# Patient Record
Sex: FEMALE | Race: WHITE | NOT HISPANIC OR LATINO | Employment: OTHER | URBAN - METROPOLITAN AREA
[De-identification: names, ages, dates, MRNs, and addresses within clinical notes are randomized per-mention and may not be internally consistent; named-entity substitution may affect disease eponyms.]

---

## 2023-04-26 ENCOUNTER — APPOINTMENT (OUTPATIENT)
Dept: PRIMARY CARE | Facility: CLINIC | Age: 73
End: 2023-04-26
Payer: COMMERCIAL

## 2023-06-05 LAB
ALANINE AMINOTRANSFERASE (SGPT) (U/L) IN SER/PLAS: 41 U/L (ref 7–45)
ALBUMIN (G/DL) IN SER/PLAS: 4.3 G/DL (ref 3.4–5)
ALKALINE PHOSPHATASE (U/L) IN SER/PLAS: 52 U/L (ref 33–136)
ANION GAP IN SER/PLAS: 12 MMOL/L (ref 10–20)
ASPARTATE AMINOTRANSFERASE (SGOT) (U/L) IN SER/PLAS: 33 U/L (ref 9–39)
BILIRUBIN TOTAL (MG/DL) IN SER/PLAS: 0.4 MG/DL (ref 0–1.2)
CALCIUM (MG/DL) IN SER/PLAS: 9.9 MG/DL (ref 8.6–10.3)
CARBON DIOXIDE, TOTAL (MMOL/L) IN SER/PLAS: 27 MMOL/L (ref 21–32)
CHLORIDE (MMOL/L) IN SER/PLAS: 102 MMOL/L (ref 98–107)
CREATININE (MG/DL) IN SER/PLAS: 0.78 MG/DL (ref 0.5–1.05)
GFR FEMALE: 80 ML/MIN/1.73M2
GLUCOSE (MG/DL) IN SER/PLAS: 159 MG/DL (ref 74–99)
POTASSIUM (MMOL/L) IN SER/PLAS: 4.3 MMOL/L (ref 3.5–5.3)
PROTEIN TOTAL: 7.2 G/DL (ref 6.4–8.2)
SODIUM (MMOL/L) IN SER/PLAS: 137 MMOL/L (ref 136–145)
UREA NITROGEN (MG/DL) IN SER/PLAS: 16 MG/DL (ref 6–23)

## 2023-06-06 LAB
ALBUMIN (MG/L) IN URINE: 25.9 MG/L
ALBUMIN/CREATININE (UG/MG) IN URINE: 51.5 UG/MG CRT (ref 0–30)
CREATININE (MG/DL) IN URINE: 50.3 MG/DL (ref 20–320)
ESTIMATED AVERAGE GLUCOSE FOR HBA1C: 186 MG/DL
HEMOGLOBIN A1C/HEMOGLOBIN TOTAL IN BLOOD: 8.1 %

## 2023-09-11 PROBLEM — E11.40 PAINFUL DIABETIC NEUROPATHY (MULTI): Status: ACTIVE | Noted: 2023-09-11

## 2023-09-11 PROBLEM — J32.9 SINUSITIS: Status: ACTIVE | Noted: 2023-09-11

## 2023-09-11 PROBLEM — R06.02 EXERTIONAL SHORTNESS OF BREATH: Status: ACTIVE | Noted: 2023-09-11

## 2023-09-11 PROBLEM — E11.65 TYPE 2 DIABETES MELLITUS WITH HYPERGLYCEMIA (MULTI): Status: ACTIVE | Noted: 2023-09-11

## 2023-09-11 PROBLEM — M17.9 OA (OSTEOARTHRITIS) OF KNEE: Status: ACTIVE | Noted: 2023-09-11

## 2023-09-11 PROBLEM — E78.5 HYPERLIPIDEMIA: Status: ACTIVE | Noted: 2023-09-11

## 2023-09-11 PROBLEM — M16.9 OA (OSTEOARTHRITIS) OF HIP: Status: ACTIVE | Noted: 2023-09-11

## 2023-09-11 PROBLEM — R53.1 GENERALIZED WEAKNESS: Status: ACTIVE | Noted: 2023-09-11

## 2023-09-11 PROBLEM — J45.909 ASTHMA (HHS-HCC): Status: ACTIVE | Noted: 2023-09-11

## 2023-09-11 PROBLEM — N63.0 BREAST MASS: Status: ACTIVE | Noted: 2023-09-11

## 2023-09-11 PROBLEM — L03.90 CELLULITIS: Status: ACTIVE | Noted: 2023-09-11

## 2023-09-11 PROBLEM — I87.8 VENOUS STASIS: Status: ACTIVE | Noted: 2023-09-11

## 2023-09-11 PROBLEM — R40.0 DAYTIME SLEEPINESS: Status: ACTIVE | Noted: 2023-09-11

## 2023-09-11 PROBLEM — T14.8XXA BRUISING: Status: ACTIVE | Noted: 2023-09-11

## 2023-09-11 PROBLEM — M25.562 BILATERAL KNEE PAIN: Status: ACTIVE | Noted: 2023-09-11

## 2023-09-11 PROBLEM — E66.9 OBESITY (BMI 30-39.9): Status: ACTIVE | Noted: 2023-09-11

## 2023-09-11 PROBLEM — R41.82 CHANGE IN MENTAL STATUS: Status: ACTIVE | Noted: 2023-09-11

## 2023-09-11 PROBLEM — R76.8 ELEVATED RHEUMATOID FACTOR: Status: ACTIVE | Noted: 2023-09-11

## 2023-09-11 PROBLEM — R74.01 TRANSAMINITIS: Status: ACTIVE | Noted: 2023-09-11

## 2023-09-11 PROBLEM — M79.673 FOOT PAIN: Status: ACTIVE | Noted: 2023-09-11

## 2023-09-11 PROBLEM — E11.9 DIABETES MELLITUS, TYPE 2 (MULTI): Status: ACTIVE | Noted: 2023-09-11

## 2023-09-11 PROBLEM — M25.561 BILATERAL KNEE PAIN: Status: ACTIVE | Noted: 2023-09-11

## 2023-09-11 PROBLEM — G62.9 PERIPHERAL NEUROPATHY: Status: ACTIVE | Noted: 2023-09-11

## 2023-09-11 PROBLEM — F99: Status: ACTIVE | Noted: 2023-09-11

## 2023-09-11 PROBLEM — M79.2 NEUROPATHIC PAIN OF LOWER EXTREMITY: Status: ACTIVE | Noted: 2023-09-11

## 2023-09-11 PROBLEM — R60.0 PEDAL EDEMA: Status: ACTIVE | Noted: 2023-09-11

## 2023-09-11 PROBLEM — G47.8 NON-RESTORATIVE SLEEP: Status: ACTIVE | Noted: 2023-09-11

## 2023-09-11 PROBLEM — E55.9 VITAMIN D DEFICIENCY: Status: ACTIVE | Noted: 2023-09-11

## 2023-09-11 PROBLEM — L65.9 HAIR LOSS: Status: ACTIVE | Noted: 2023-09-11

## 2023-09-11 PROBLEM — J30.0 CHRONIC VASOMOTOR RHINITIS: Status: ACTIVE | Noted: 2023-09-11

## 2023-09-11 PROBLEM — G47.10 ACUTE HYPERSOMNOLENCE DISORDER: Status: ACTIVE | Noted: 2023-09-11

## 2023-09-11 PROBLEM — R91.8 MASS OF LUNG: Status: ACTIVE | Noted: 2023-09-11

## 2023-09-11 PROBLEM — G47.00 INSOMNIA: Status: ACTIVE | Noted: 2023-09-11

## 2023-09-11 PROBLEM — R05.9 COUGH: Status: ACTIVE | Noted: 2023-09-11

## 2023-09-11 PROBLEM — I10 ESSENTIAL HYPERTENSION: Status: ACTIVE | Noted: 2023-09-11

## 2023-09-11 PROBLEM — G47.30 SLEEP APNEA: Status: ACTIVE | Noted: 2023-09-11

## 2023-09-11 PROBLEM — H92.09 OTALGIA: Status: ACTIVE | Noted: 2023-09-11

## 2023-09-11 PROBLEM — M25.50 ARTHRALGIA OF MULTIPLE JOINTS: Status: ACTIVE | Noted: 2023-09-11

## 2023-09-11 PROBLEM — R91.1 PULMONARY NODULE: Status: ACTIVE | Noted: 2023-09-11

## 2023-10-12 DIAGNOSIS — E11.9 TYPE 2 DIABETES MELLITUS WITHOUT COMPLICATION, WITHOUT LONG-TERM CURRENT USE OF INSULIN (MULTI): Primary | ICD-10-CM

## 2023-10-13 RX ORDER — SITAGLIPTIN 100 MG/1
100 TABLET, FILM COATED ORAL DAILY
Qty: 90 TABLET | Refills: 0 | Status: SHIPPED | OUTPATIENT
Start: 2023-10-13 | End: 2023-12-15

## 2023-11-30 ENCOUNTER — PATIENT MESSAGE (OUTPATIENT)
Dept: ENDOCRINOLOGY | Facility: CLINIC | Age: 73
End: 2023-11-30
Payer: COMMERCIAL

## 2023-11-30 DIAGNOSIS — E11.65 TYPE 2 DIABETES MELLITUS WITH HYPERGLYCEMIA, WITHOUT LONG-TERM CURRENT USE OF INSULIN (MULTI): Primary | ICD-10-CM

## 2023-11-30 RX ORDER — REPAGLINIDE 2 MG/1
TABLET ORAL
Qty: 360 TABLET | Refills: 1 | Status: SHIPPED | OUTPATIENT
Start: 2023-11-30 | End: 2024-04-25 | Stop reason: ALTCHOICE

## 2023-11-30 RX ORDER — FLASH GLUCOSE SENSOR
KIT MISCELLANEOUS
Qty: 6 EACH | Refills: 3 | Status: SHIPPED | OUTPATIENT
Start: 2023-11-30 | End: 2024-02-13 | Stop reason: SDUPTHER

## 2023-11-30 RX ORDER — REPAGLINIDE 2 MG/1
2-4 TABLET ORAL 2 TIMES DAILY
COMMUNITY
End: 2023-11-30 | Stop reason: SDUPTHER

## 2023-11-30 NOTE — TELEPHONE ENCOUNTER
From: Lexis Denny  To: Sangeetha Champagne APRN-CNP  Sent: 11/30/2023 1:15 AM EST  Subject: Prescription Refills    Jamie Vivas,  This message concerns all three of my prescriptions:  1. I need a new refill prescription for Repaglinide 2mg.   2. I also need a new refill prescription for the Free Style Gabbi, CGM  3. Januvia - Also it’s time to send in to send in the enrollment form for the [employer] Patient Assistance Program for the Januvia 100mg I take. The form has to be resubmitted each year. I have the form and completed my section. I’m planning on dropping it off today (Thursday 11/30) at your office so you can fill out your part. I have enough Januvia to get me through Dec. but maybe you could give me some samples in case it takes too much time for the application to be processed.   Thank you Sangeetha !  Alycia

## 2023-12-15 DIAGNOSIS — E11.9 TYPE 2 DIABETES MELLITUS WITHOUT COMPLICATION, WITHOUT LONG-TERM CURRENT USE OF INSULIN (MULTI): ICD-10-CM

## 2023-12-15 RX ORDER — SITAGLIPTIN 100 MG/1
100 TABLET, FILM COATED ORAL DAILY
Qty: 90 TABLET | Refills: 0 | Status: SHIPPED | OUTPATIENT
Start: 2023-12-15 | End: 2024-02-08 | Stop reason: ALTCHOICE

## 2024-01-02 ENCOUNTER — PATIENT MESSAGE (OUTPATIENT)
Dept: ENDOCRINOLOGY | Facility: CLINIC | Age: 74
End: 2024-01-02

## 2024-01-02 DIAGNOSIS — E11.65 TYPE 2 DIABETES MELLITUS WITH HYPERGLYCEMIA, WITHOUT LONG-TERM CURRENT USE OF INSULIN (MULTI): Primary | ICD-10-CM

## 2024-01-11 ENCOUNTER — TELEMEDICINE (OUTPATIENT)
Dept: PHARMACY | Facility: HOSPITAL | Age: 74
End: 2024-01-11
Payer: MEDICARE

## 2024-01-11 DIAGNOSIS — E11.65 TYPE 2 DIABETES MELLITUS WITH HYPERGLYCEMIA, WITHOUT LONG-TERM CURRENT USE OF INSULIN (MULTI): ICD-10-CM

## 2024-01-12 ENCOUNTER — TELEPHONE (OUTPATIENT)
Dept: ENDOCRINOLOGY | Facility: CLINIC | Age: 74
End: 2024-01-12
Payer: MEDICARE

## 2024-01-12 RX ORDER — SEMAGLUTIDE 0.68 MG/ML
INJECTION, SOLUTION SUBCUTANEOUS
Qty: 3 ML | Refills: 3 | Status: SHIPPED | OUTPATIENT
Start: 2024-01-12 | End: 2024-01-12 | Stop reason: ALTCHOICE

## 2024-01-12 NOTE — TELEPHONE ENCOUNTER
Alycia left a VM to thank you for all your care. She is doing well on the Mounjaro. No nausea. She had an appt with Madhavi and had good visit with him. She just wanted to let you know she is very grateful and is doing well.

## 2024-01-24 RX ORDER — TIRZEPATIDE 5 MG/.5ML
5 INJECTION, SOLUTION SUBCUTANEOUS
Qty: 2 ML | Refills: 3 | Status: SHIPPED | OUTPATIENT
Start: 2024-01-24 | End: 2024-05-03 | Stop reason: SDUPTHER

## 2024-01-24 NOTE — PROGRESS NOTES
Clinical Pharmacy Team contacted met with Lexis Denny regarding a consultation for diabetes management thanks to a referral from Sangeetha Champagne CNP. Below is a summary of our conversation and recommendations:    [facility]  ________________________________________________________________________      Allergies   Allergen Reactions    Acetaminophen Other    Aspirin Other    Clarithromycin Other    Metformin Other    Rosuvastatin Unknown       Objective     There were no vitals taken for this visit.    Diabetes Pharmacotherapy:    Januvia 100 mg daily  Repaglinide 2mg tablet      Lab Review  Lab Results   Component Value Date    BILITOT 0.4 06/05/2023    CALCIUM 9.9 06/05/2023    CO2 27 06/05/2023     06/05/2023    CREATININE 0.78 06/05/2023    GLUCOSE 159 (H) 06/05/2023    ALKPHOS 52 06/05/2023    K 4.3 06/05/2023    PROT 7.2 06/05/2023     06/05/2023    AST 33 06/05/2023    ALT 41 06/05/2023    BUN 16 06/05/2023    ANIONGAP 12 06/05/2023    MG 2.11 06/25/2020    PHOS 3.8 11/11/2020    ALBUMIN 4.3 06/05/2023    GFRF 80 06/05/2023     Lab Results   Component Value Date    TRIG 177 (H) 12/16/2022    CHOL 200 (H) 12/16/2022    HDL 31.3 (A) 12/16/2022     Lab Results   Component Value Date    HGBA1C 8.1 (A) 06/05/2023    HGBA1C 11.9 (A) 01/27/2022    HGBA1C 7.9 (A) 10/07/2021     The 10-year ASCVD risk score (Veronica CARABALLO, et al., 2019) is: 35.7%    Values used to calculate the score:      Age: 73 years      Sex: Female      Is Non- : No      Diabetic: Yes      Tobacco smoker: No      Systolic Blood Pressure: 138 mmHg      Is BP treated: Yes      HDL Cholesterol: 31.3 mg/dL      Total Cholesterol: 200 mg/dL            Assessment/Plan     The patient reports today for a diabetes consultation. She was instructed to start mounjaro from her endocrinology provider. We discussed therapy today.     Mounjaro Education:     - Counseled patient on MOA, expectations, side effects,  duration of therapy, contraindications, administration, and monitoring parameters  - Answered all patient questions and concerns  - Counseled patient on Mounjaro MOA, expectations, side effects, duration of therapy, administration, and monitoring parameters.  - Provided detailed dosing and administration counseling to ensure proper technique.   - Reviewed Mounjaro titration schedule, starting with 2.5 mg once weekly to a goal of 15 mg once weekly if tolerate  - Counseled patient on the benefits of GLP-1ra glycemic control and weight loss  - Reviewed storage requirements of Mounjaro when not in use, and when to administer the medication if a dose is missed.  - Advised patient that they may experience improved satiety after meals and portion sizes of meals may be reduced as doses of Mounjaro increase.      Patient agreeable to discussion. Will send to  pharmacy for copay assistance.    PATIENT EDUCATION/GOALS    Goals  Fasting B - 130 mg/dL  Postprandial BG: less than 180 mg/dL  A1c: less than 7%    Type of encounter: [ virtual ]    Provided counseling on lifestyle modifications, medications, and self-monitoring. Patient has no additional questions at this time. Pharmacy to follow up in 4-6 weeks. Please reach out with any questions. Thank you.       Madhavi Downs, LynneD      Continue all meds under the continuation of care with the referring provider and clinical pharmacy team.

## 2024-01-24 NOTE — TELEPHONE ENCOUNTER
Will forward to pharmacy.  Where do we stand on her application for  PAP?  I may need to supply her with another sample if needed.

## 2024-01-24 NOTE — TELEPHONE ENCOUNTER
Left another message just to say she is really liking the mounjaro and she is doing well with it. She did mention that she'd like to know if you've heard anything about the financial program for it and whether or not she qualifies.

## 2024-01-25 ENCOUNTER — PHARMACY VISIT (OUTPATIENT)
Dept: PHARMACY | Facility: CLINIC | Age: 74
End: 2024-01-25
Payer: COMMERCIAL

## 2024-01-25 PROCEDURE — RXMED WILLOW AMBULATORY MEDICATION CHARGE

## 2024-02-08 ENCOUNTER — TELEMEDICINE (OUTPATIENT)
Dept: PHARMACY | Facility: HOSPITAL | Age: 74
End: 2024-02-08
Payer: MEDICARE

## 2024-02-08 DIAGNOSIS — E11.65 TYPE 2 DIABETES MELLITUS WITH HYPERGLYCEMIA, WITHOUT LONG-TERM CURRENT USE OF INSULIN (MULTI): Primary | ICD-10-CM

## 2024-02-13 ENCOUNTER — TELEPHONE (OUTPATIENT)
Dept: ENDOCRINOLOGY | Facility: CLINIC | Age: 74
End: 2024-02-13
Payer: MEDICARE

## 2024-02-13 DIAGNOSIS — E11.65 TYPE 2 DIABETES MELLITUS WITH HYPERGLYCEMIA, WITHOUT LONG-TERM CURRENT USE OF INSULIN (MULTI): ICD-10-CM

## 2024-02-13 PROCEDURE — RXMED WILLOW AMBULATORY MEDICATION CHARGE

## 2024-02-13 RX ORDER — FLASH GLUCOSE SENSOR
KIT MISCELLANEOUS
Qty: 6 EACH | Refills: 3 | Status: SHIPPED | OUTPATIENT
Start: 2024-02-13

## 2024-02-13 NOTE — TELEPHONE ENCOUNTER
Her [facility] on file is permanently closing. She is trying to figure out best next steps for her meds and is considering using [facility] mail order. She stated in her message she would have Gene send a My Chart, but that has not come through. I have initiated a My Chart to them in regard.    She started to say she's doing well, but her VM cut off.

## 2024-02-14 ENCOUNTER — PHARMACY VISIT (OUTPATIENT)
Dept: PHARMACY | Facility: CLINIC | Age: 74
End: 2024-02-14
Payer: COMMERCIAL

## 2024-02-15 PROCEDURE — RXMED WILLOW AMBULATORY MEDICATION CHARGE

## 2024-02-19 ENCOUNTER — PHARMACY VISIT (OUTPATIENT)
Dept: PHARMACY | Facility: CLINIC | Age: 74
End: 2024-02-19
Payer: COMMERCIAL

## 2024-03-07 ENCOUNTER — TELEMEDICINE CLINICAL SUPPORT (OUTPATIENT)
Dept: PRIMARY CARE | Facility: CLINIC | Age: 74
End: 2024-03-07
Payer: COMMERCIAL

## 2024-03-07 DIAGNOSIS — R09.89 SINUS SYMPTOM: Primary | ICD-10-CM

## 2024-03-07 PROCEDURE — 99213 OFFICE O/P EST LOW 20 MIN: CPT | Performed by: NURSE PRACTITIONER

## 2024-03-07 RX ORDER — AMOXICILLIN AND CLAVULANATE POTASSIUM 875; 125 MG/1; MG/1
1 TABLET, FILM COATED ORAL 2 TIMES DAILY
Qty: 14 TABLET | Refills: 0 | Status: SHIPPED | OUTPATIENT
Start: 2024-03-07 | End: 2024-03-14

## 2024-03-07 ASSESSMENT — LIFESTYLE VARIABLES: HISTORY_OF_SMOKING: I HAVE NEVER SMOKED

## 2024-03-07 NOTE — PROGRESS NOTES
"Subjective   Patient ID: Lexis Denny \"Yessica" is a 74 y.o. female who presents for No chief complaint on file..  Sinus symptoms 2-3 weeks now mainly right side of forehead and cheek.  Only OTC Similasen not helping.  Some Flonase    No fever        Review of Systems   Constitutional: Negative.    HENT:  Positive for congestion, postnasal drip, sinus pressure and sinus pain.    Respiratory: Negative.     Cardiovascular: Negative.        Objective   Physical Exam  Constitutional:       General: She is not in acute distress.     Appearance: Normal appearance. She is not toxic-appearing.   HENT:      Nose: Congestion present.   Pulmonary:      Effort: Pulmonary effort is normal.   Musculoskeletal:      Cervical back: Normal range of motion.   Neurological:      Mental Status: She is oriented to person, place, and time.         Assessment/Plan   Diagnoses and all orders for this visit:  Sinus symptom  -     amoxicillin-pot clavulanate (Augmentin) 875-125 mg tablet; Take 1 tablet by mouth 2 times a day for 7 days.           PITA Mcgovern-CNP 03/07/24 2:52 PM   "

## 2024-03-19 ENCOUNTER — PHARMACY VISIT (OUTPATIENT)
Dept: PHARMACY | Facility: CLINIC | Age: 74
End: 2024-03-19
Payer: COMMERCIAL

## 2024-03-19 PROCEDURE — RXMED WILLOW AMBULATORY MEDICATION CHARGE

## 2024-04-10 ASSESSMENT — ENCOUNTER SYMPTOMS
SINUS PRESSURE: 1
RESPIRATORY NEGATIVE: 1
SINUS PAIN: 1
CARDIOVASCULAR NEGATIVE: 1
CONSTITUTIONAL NEGATIVE: 1

## 2024-04-10 NOTE — ASSESSMENT & PLAN NOTE
Hx and limited exam are c/w emerging secondary bacterial infection, will treat.  Reviewed correct admin of Flonase  Discussed typical course versus symptoms to report  PLAIN Mucinex may be helpful in thinning secretions.

## 2024-04-12 PROCEDURE — RXMED WILLOW AMBULATORY MEDICATION CHARGE

## 2024-04-16 ENCOUNTER — PHARMACY VISIT (OUTPATIENT)
Dept: PHARMACY | Facility: CLINIC | Age: 74
End: 2024-04-16
Payer: COMMERCIAL

## 2024-04-16 ENCOUNTER — PATIENT MESSAGE (OUTPATIENT)
Dept: ENDOCRINOLOGY | Facility: CLINIC | Age: 74
End: 2024-04-16
Payer: MEDICARE

## 2024-04-17 NOTE — TELEPHONE ENCOUNTER
From: Lexis Denny  To: Sangeetha Fetskyler  Sent: 4/16/2024 7:35 PM EDT  Subject: Not feeling well at all.     Jamie Vivas this is Alycia’s  Gene. Alycia doesn’t do My Chart so I’m doing it for her. And I can add my own observations as well. She needs to come in and see you in person not a virtual visit. She hasn’t been In person to a doctors office in well over a year. I believe she needs to have “eyes on”her. She puts on a positive attitude in the virtual visits. That’s her nature, but I can tell you living with her daily it’s not as “jimmy” as she makes it out to be. The last week or two it’s been particularly bad. She is very weak and tired all the time. That’s in addition to the almost constant pain. She also needs to have her feet looked at and maybe her eyes too. But she is so adverse to going to the doctor. She told me again today that she thinks she’s dying. She says that a lot ! The last week or so I’m starting to believe her ! She thinks her current weakness and fatigue is caused by the Mucinex that a [facility] doctor advised her to take. Again no in office visit.   But weakness and fatigue has been pretty constant over the last 4 years. She doesn’t sleep at night and only naps during the day. So I know she has sleep deprivation. I can never tell what is simply lack of sleep or some actual physical issue. She did phone your office today and left a message. I think she needs to see you in person as soon as possible. She has multiple issues that need attention by multiple providers.   Thank you Sangeetha for all your great care and compassion - always !  Subhash

## 2024-04-25 ENCOUNTER — APPOINTMENT (OUTPATIENT)
Dept: CARDIOLOGY | Facility: HOSPITAL | Age: 74
DRG: 309 | End: 2024-04-25
Payer: MEDICARE

## 2024-04-25 ENCOUNTER — HOSPITAL ENCOUNTER (INPATIENT)
Facility: HOSPITAL | Age: 74
LOS: 4 days | Discharge: HOME | DRG: 309 | End: 2024-04-29
Attending: EMERGENCY MEDICINE | Admitting: INTERNAL MEDICINE
Payer: MEDICARE

## 2024-04-25 ENCOUNTER — APPOINTMENT (OUTPATIENT)
Dept: RADIOLOGY | Facility: HOSPITAL | Age: 74
DRG: 309 | End: 2024-04-25
Payer: MEDICARE

## 2024-04-25 ENCOUNTER — OFFICE VISIT (OUTPATIENT)
Dept: PRIMARY CARE | Facility: CLINIC | Age: 74
End: 2024-04-25
Payer: MEDICARE

## 2024-04-25 VITALS
SYSTOLIC BLOOD PRESSURE: 170 MMHG | OXYGEN SATURATION: 93 % | DIASTOLIC BLOOD PRESSURE: 130 MMHG | WEIGHT: 250 LBS | HEIGHT: 65 IN | BODY MASS INDEX: 41.65 KG/M2 | HEART RATE: 153 BPM

## 2024-04-25 DIAGNOSIS — E11.65 TYPE 2 DIABETES MELLITUS WITH HYPERGLYCEMIA, UNSPECIFIED WHETHER LONG TERM INSULIN USE (MULTI): ICD-10-CM

## 2024-04-25 DIAGNOSIS — I48.91 ATRIAL FIBRILLATION WITH RVR (MULTI): Primary | ICD-10-CM

## 2024-04-25 DIAGNOSIS — Z00.00 ROUTINE GENERAL MEDICAL EXAMINATION AT HEALTH CARE FACILITY: ICD-10-CM

## 2024-04-25 DIAGNOSIS — I50.21 ACUTE CLINICAL SYSTOLIC HEART FAILURE (MULTI): ICD-10-CM

## 2024-04-25 DIAGNOSIS — I48.91 ATRIAL FIBRILLATION WITH RAPID VENTRICULAR RESPONSE (MULTI): ICD-10-CM

## 2024-04-25 DIAGNOSIS — I48.91 ATRIAL FIBRILLATION WITH RAPID VENTRICULAR RESPONSE (MULTI): Primary | ICD-10-CM

## 2024-04-25 DIAGNOSIS — R06.02 SHORTNESS OF BREATH AT REST: ICD-10-CM

## 2024-04-25 DIAGNOSIS — I50.9 HEART FAILURE, UNSPECIFIED (MULTI): ICD-10-CM

## 2024-04-25 DIAGNOSIS — I48.91 ATRIAL FIBRILLATION, UNSPECIFIED TYPE (MULTI): ICD-10-CM

## 2024-04-25 DIAGNOSIS — R05.2 SUBACUTE COUGH: ICD-10-CM

## 2024-04-25 DIAGNOSIS — I48.21 ATRIAL FIBRILLATION, PERMANENT (MULTI): ICD-10-CM

## 2024-04-25 LAB
ANION GAP SERPL CALC-SCNC: 14 MMOL/L (ref 10–20)
BASOPHILS # BLD AUTO: 0.07 X10*3/UL (ref 0–0.1)
BASOPHILS NFR BLD AUTO: 0.7 %
BNP SERPL-MCNC: 421 PG/ML (ref 0–99)
BUN SERPL-MCNC: 15 MG/DL (ref 6–23)
CALCIUM SERPL-MCNC: 9 MG/DL (ref 8.6–10.3)
CARDIAC TROPONIN I PNL SERPL HS: 13 NG/L (ref 0–13)
CARDIAC TROPONIN I PNL SERPL HS: 13 NG/L (ref 0–13)
CHLORIDE SERPL-SCNC: 94 MMOL/L (ref 98–107)
CO2 SERPL-SCNC: 23 MMOL/L (ref 21–32)
CREAT SERPL-MCNC: 0.78 MG/DL (ref 0.5–1.05)
EGFRCR SERPLBLD CKD-EPI 2021: 80 ML/MIN/1.73M*2
EOSINOPHIL # BLD AUTO: 0.07 X10*3/UL (ref 0–0.4)
EOSINOPHIL NFR BLD AUTO: 0.7 %
ERYTHROCYTE [DISTWIDTH] IN BLOOD BY AUTOMATED COUNT: 15.6 % (ref 11.5–14.5)
GLUCOSE BLD MANUAL STRIP-MCNC: 151 MG/DL (ref 74–99)
GLUCOSE SERPL-MCNC: 130 MG/DL (ref 74–99)
HCT VFR BLD AUTO: 41.8 % (ref 36–46)
HGB BLD-MCNC: 12.9 G/DL (ref 12–16)
IMM GRANULOCYTES # BLD AUTO: 0.03 X10*3/UL (ref 0–0.5)
IMM GRANULOCYTES NFR BLD AUTO: 0.3 % (ref 0–0.9)
LACTATE SERPL-SCNC: 1.1 MMOL/L (ref 0.4–2)
LYMPHOCYTES # BLD AUTO: 1.52 X10*3/UL (ref 0.8–3)
LYMPHOCYTES NFR BLD AUTO: 15.4 %
MCH RBC QN AUTO: 27.4 PG (ref 26–34)
MCHC RBC AUTO-ENTMCNC: 30.9 G/DL (ref 32–36)
MCV RBC AUTO: 89 FL (ref 80–100)
MONOCYTES # BLD AUTO: 1.16 X10*3/UL (ref 0.05–0.8)
MONOCYTES NFR BLD AUTO: 11.8 %
NEUTROPHILS # BLD AUTO: 6.99 X10*3/UL (ref 1.6–5.5)
NEUTROPHILS NFR BLD AUTO: 71.1 %
NRBC BLD-RTO: 0 /100 WBCS (ref 0–0)
PHOSPHATE SERPL-MCNC: 3.3 MG/DL (ref 2.5–4.9)
PLATELET # BLD AUTO: 285 X10*3/UL (ref 150–450)
POTASSIUM SERPL-SCNC: 4 MMOL/L (ref 3.5–5.3)
RBC # BLD AUTO: 4.7 X10*6/UL (ref 4–5.2)
SODIUM SERPL-SCNC: 127 MMOL/L (ref 136–145)
UFH PPP CHRO-ACNC: 0.5 IU/ML
WBC # BLD AUTO: 9.8 X10*3/UL (ref 4.4–11.3)

## 2024-04-25 PROCEDURE — 2500000004 HC RX 250 GENERAL PHARMACY W/ HCPCS (ALT 636 FOR OP/ED): Performed by: INTERNAL MEDICINE

## 2024-04-25 PROCEDURE — 1157F ADVNC CARE PLAN IN RCRD: CPT | Performed by: NURSE PRACTITIONER

## 2024-04-25 PROCEDURE — 2500000005 HC RX 250 GENERAL PHARMACY W/O HCPCS: Performed by: EMERGENCY MEDICINE

## 2024-04-25 PROCEDURE — 96361 HYDRATE IV INFUSION ADD-ON: CPT

## 2024-04-25 PROCEDURE — 99215 OFFICE O/P EST HI 40 MIN: CPT | Performed by: NURSE PRACTITIONER

## 2024-04-25 PROCEDURE — 84484 ASSAY OF TROPONIN QUANT: CPT | Performed by: EMERGENCY MEDICINE

## 2024-04-25 PROCEDURE — 83880 ASSAY OF NATRIURETIC PEPTIDE: CPT | Performed by: EMERGENCY MEDICINE

## 2024-04-25 PROCEDURE — 3077F SYST BP >= 140 MM HG: CPT | Performed by: NURSE PRACTITIONER

## 2024-04-25 PROCEDURE — 85025 COMPLETE CBC W/AUTO DIFF WBC: CPT | Performed by: EMERGENCY MEDICINE

## 2024-04-25 PROCEDURE — 36415 COLL VENOUS BLD VENIPUNCTURE: CPT | Performed by: EMERGENCY MEDICINE

## 2024-04-25 PROCEDURE — 96375 TX/PRO/DX INJ NEW DRUG ADDON: CPT

## 2024-04-25 PROCEDURE — 1124F ACP DISCUSS-NO DSCNMKR DOCD: CPT | Performed by: NURSE PRACTITIONER

## 2024-04-25 PROCEDURE — 80048 BASIC METABOLIC PNL TOTAL CA: CPT | Performed by: EMERGENCY MEDICINE

## 2024-04-25 PROCEDURE — 93005 ELECTROCARDIOGRAM TRACING: CPT

## 2024-04-25 PROCEDURE — 4010F ACE/ARB THERAPY RXD/TAKEN: CPT | Performed by: NURSE PRACTITIONER

## 2024-04-25 PROCEDURE — 3080F DIAST BP >= 90 MM HG: CPT | Performed by: NURSE PRACTITIONER

## 2024-04-25 PROCEDURE — 93000 ELECTROCARDIOGRAM COMPLETE: CPT | Performed by: NURSE PRACTITIONER

## 2024-04-25 PROCEDURE — 1159F MED LIST DOCD IN RCRD: CPT | Performed by: NURSE PRACTITIONER

## 2024-04-25 PROCEDURE — 96374 THER/PROPH/DIAG INJ IV PUSH: CPT

## 2024-04-25 PROCEDURE — 2060000001 HC INTERMEDIATE ICU ROOM DAILY

## 2024-04-25 PROCEDURE — 85520 HEPARIN ASSAY: CPT | Performed by: INTERNAL MEDICINE

## 2024-04-25 PROCEDURE — 83605 ASSAY OF LACTIC ACID: CPT | Performed by: EMERGENCY MEDICINE

## 2024-04-25 PROCEDURE — 99223 1ST HOSP IP/OBS HIGH 75: CPT | Performed by: INTERNAL MEDICINE

## 2024-04-25 PROCEDURE — 36415 COLL VENOUS BLD VENIPUNCTURE: CPT | Performed by: INTERNAL MEDICINE

## 2024-04-25 PROCEDURE — 99285 EMERGENCY DEPT VISIT HI MDM: CPT | Mod: 25

## 2024-04-25 PROCEDURE — 1160F RVW MEDS BY RX/DR IN RCRD: CPT | Performed by: NURSE PRACTITIONER

## 2024-04-25 PROCEDURE — 71045 X-RAY EXAM CHEST 1 VIEW: CPT

## 2024-04-25 PROCEDURE — 2500000005 HC RX 250 GENERAL PHARMACY W/O HCPCS: Performed by: INTERNAL MEDICINE

## 2024-04-25 PROCEDURE — 82947 ASSAY GLUCOSE BLOOD QUANT: CPT

## 2024-04-25 PROCEDURE — 1170F FXNL STATUS ASSESSED: CPT | Performed by: NURSE PRACTITIONER

## 2024-04-25 PROCEDURE — 2500000004 HC RX 250 GENERAL PHARMACY W/ HCPCS (ALT 636 FOR OP/ED): Performed by: EMERGENCY MEDICINE

## 2024-04-25 PROCEDURE — 71045 X-RAY EXAM CHEST 1 VIEW: CPT | Performed by: RADIOLOGY

## 2024-04-25 PROCEDURE — 84100 ASSAY OF PHOSPHORUS: CPT | Performed by: EMERGENCY MEDICINE

## 2024-04-25 PROCEDURE — 1036F TOBACCO NON-USER: CPT | Performed by: NURSE PRACTITIONER

## 2024-04-25 RX ORDER — IBUPROFEN 200 MG
1 TABLET ORAL EVERY 6 HOURS
COMMUNITY
Start: 2020-02-26 | End: 2024-05-02 | Stop reason: WASHOUT

## 2024-04-25 RX ORDER — FUROSEMIDE 10 MG/ML
20 INJECTION INTRAMUSCULAR; INTRAVENOUS EVERY 12 HOURS
Status: DISCONTINUED | OUTPATIENT
Start: 2024-04-26 | End: 2024-04-26

## 2024-04-25 RX ORDER — FUROSEMIDE 10 MG/ML
20 INJECTION INTRAMUSCULAR; INTRAVENOUS ONCE
Status: COMPLETED | OUTPATIENT
Start: 2024-04-25 | End: 2024-04-25

## 2024-04-25 RX ORDER — METOPROLOL TARTRATE 1 MG/ML
5 INJECTION, SOLUTION INTRAVENOUS EVERY 6 HOURS PRN
Status: DISCONTINUED | OUTPATIENT
Start: 2024-04-25 | End: 2024-04-29 | Stop reason: HOSPADM

## 2024-04-25 RX ORDER — HEPARIN SODIUM 10000 [USP'U]/100ML
0-4500 INJECTION, SOLUTION INTRAVENOUS CONTINUOUS
Status: DISCONTINUED | OUTPATIENT
Start: 2024-04-25 | End: 2024-04-28

## 2024-04-25 RX ORDER — METOPROLOL TARTRATE 25 MG/1
25 TABLET, FILM COATED ORAL 2 TIMES DAILY
Status: DISCONTINUED | OUTPATIENT
Start: 2024-04-25 | End: 2024-04-26

## 2024-04-25 RX ORDER — CHOLECALCIFEROL (VITAMIN D3) 50 MCG
1 TABLET ORAL EVERY MORNING
COMMUNITY
End: 2024-05-22 | Stop reason: ALTCHOICE

## 2024-04-25 RX ORDER — METOPROLOL TARTRATE 1 MG/ML
5 INJECTION, SOLUTION INTRAVENOUS ONCE
Status: COMPLETED | OUTPATIENT
Start: 2024-04-25 | End: 2024-04-25

## 2024-04-25 RX ORDER — DILTIAZEM HCL/D5W 125 MG/125
5-15 PLASTIC BAG, INJECTION (ML) INTRAVENOUS CONTINUOUS
Status: DISCONTINUED | OUTPATIENT
Start: 2024-04-25 | End: 2024-04-26

## 2024-04-25 RX ADMIN — METOPROLOL TARTRATE 5 MG: 5 INJECTION INTRAVENOUS at 18:04

## 2024-04-25 RX ADMIN — HEPARIN SODIUM 1800 UNITS/HR: 10000 INJECTION, SOLUTION INTRAVENOUS at 17:42

## 2024-04-25 RX ADMIN — SODIUM CHLORIDE, POTASSIUM CHLORIDE, SODIUM LACTATE AND CALCIUM CHLORIDE 250 ML: 600; 310; 30; 20 INJECTION, SOLUTION INTRAVENOUS at 13:55

## 2024-04-25 RX ADMIN — Medication 5 MG/HR: at 20:43

## 2024-04-25 RX ADMIN — METOPROLOL TARTRATE 5 MG: 5 INJECTION INTRAVENOUS at 14:22

## 2024-04-25 RX ADMIN — FUROSEMIDE 20 MG: 10 INJECTION, SOLUTION INTRAMUSCULAR; INTRAVENOUS at 14:22

## 2024-04-25 SDOH — ECONOMIC STABILITY: HOUSING INSECURITY: IN THE LAST 12 MONTHS, HOW MANY PLACES HAVE YOU LIVED?: 1

## 2024-04-25 SDOH — ECONOMIC STABILITY: TRANSPORTATION INSECURITY
IN THE PAST 12 MONTHS, HAS LACK OF TRANSPORTATION KEPT YOU FROM MEETINGS, WORK, OR FROM GETTING THINGS NEEDED FOR DAILY LIVING?: NO

## 2024-04-25 SDOH — SOCIAL STABILITY: SOCIAL INSECURITY: HAS ANYONE EVER THREATENED TO HURT YOUR FAMILY OR YOUR PETS?: NO

## 2024-04-25 SDOH — SOCIAL STABILITY: SOCIAL INSECURITY: ARE YOU OR HAVE YOU BEEN THREATENED OR ABUSED PHYSICALLY, EMOTIONALLY, OR SEXUALLY BY ANYONE?: NO

## 2024-04-25 SDOH — SOCIAL STABILITY: SOCIAL INSECURITY: DO YOU FEEL ANYONE HAS EXPLOITED OR TAKEN ADVANTAGE OF YOU FINANCIALLY OR OF YOUR PERSONAL PROPERTY?: NO

## 2024-04-25 SDOH — ECONOMIC STABILITY: INCOME INSECURITY: IN THE LAST 12 MONTHS, WAS THERE A TIME WHEN YOU WERE NOT ABLE TO PAY THE MORTGAGE OR RENT ON TIME?: NO

## 2024-04-25 SDOH — ECONOMIC STABILITY: TRANSPORTATION INSECURITY
IN THE PAST 12 MONTHS, HAS THE LACK OF TRANSPORTATION KEPT YOU FROM MEDICAL APPOINTMENTS OR FROM GETTING MEDICATIONS?: NO

## 2024-04-25 SDOH — ECONOMIC STABILITY: FOOD INSECURITY: WITHIN THE PAST 12 MONTHS, YOU WORRIED THAT YOUR FOOD WOULD RUN OUT BEFORE YOU GOT MONEY TO BUY MORE.: NEVER TRUE

## 2024-04-25 SDOH — SOCIAL STABILITY: SOCIAL INSECURITY: HAVE YOU HAD THOUGHTS OF HARMING ANYONE ELSE?: NO

## 2024-04-25 SDOH — ECONOMIC STABILITY: FOOD INSECURITY: WITHIN THE PAST 12 MONTHS, THE FOOD YOU BOUGHT JUST DIDN'T LAST AND YOU DIDN'T HAVE MONEY TO GET MORE.: NEVER TRUE

## 2024-04-25 SDOH — ECONOMIC STABILITY: HOUSING INSECURITY
IN THE LAST 12 MONTHS, WAS THERE A TIME WHEN YOU DID NOT HAVE A STEADY PLACE TO SLEEP OR SLEPT IN A SHELTER (INCLUDING NOW)?: NO

## 2024-04-25 SDOH — SOCIAL STABILITY: SOCIAL INSECURITY: DOES ANYONE TRY TO KEEP YOU FROM HAVING/CONTACTING OTHER FRIENDS OR DOING THINGS OUTSIDE YOUR HOME?: NO

## 2024-04-25 SDOH — ECONOMIC STABILITY: TRANSPORTATION INSECURITY: IN THE PAST 12 MONTHS, HAS LACK OF TRANSPORTATION KEPT YOU FROM MEDICAL APPOINTMENTS OR FROM GETTING MEDICATIONS?: NO

## 2024-04-25 SDOH — SOCIAL STABILITY: SOCIAL INSECURITY: ARE THERE ANY APPARENT SIGNS OF INJURIES/BEHAVIORS THAT COULD BE RELATED TO ABUSE/NEGLECT?: NO

## 2024-04-25 SDOH — SOCIAL STABILITY: SOCIAL INSECURITY: WERE YOU ABLE TO COMPLETE ALL THE BEHAVIORAL HEALTH SCREENINGS?: YES

## 2024-04-25 SDOH — SOCIAL STABILITY: SOCIAL INSECURITY: ABUSE: ADULT

## 2024-04-25 SDOH — SOCIAL STABILITY: SOCIAL INSECURITY: DO YOU FEEL UNSAFE GOING BACK TO THE PLACE WHERE YOU ARE LIVING?: NO

## 2024-04-25 SDOH — ECONOMIC STABILITY: TRANSPORTATION INSECURITY

## 2024-04-25 SDOH — ECONOMIC STABILITY: FOOD INSECURITY: WITHIN THE PAST 12 MONTHS, YOU WORRIED THAT YOUR FOOD WOULD RUN OUT BEFORE YOU GOT THE MONEY TO BUY MORE.: NEVER TRUE

## 2024-04-25 SDOH — ECONOMIC STABILITY: FOOD INSECURITY

## 2024-04-25 SDOH — ECONOMIC STABILITY: HOUSING INSECURITY: IN THE LAST 12 MONTHS, WAS THERE A TIME WHEN YOU WERE NOT ABLE TO PAY THE MORTGAGE OR RENT ON TIME?: NO

## 2024-04-25 SDOH — ECONOMIC STABILITY: FOOD INSECURITY: WITHIN THE PAST 12 MONTHS, THE FOOD YOU BOUGHT JUST DIDN’T LAST AND YOU DIDN’T HAVE MONEY TO GET MORE.: NEVER TRUE

## 2024-04-25 SDOH — SOCIAL STABILITY: SOCIAL INSECURITY: HAVE YOU HAD ANY THOUGHTS OF HARMING ANYONE ELSE?: NO

## 2024-04-25 SDOH — ECONOMIC STABILITY: HOUSING INSECURITY

## 2024-04-25 SDOH — ECONOMIC STABILITY: HOUSING INSECURITY: IN THE PAST 12 MONTHS HAS THE ELECTRIC, GAS, OIL, OR WATER COMPANY THREATENED TO SHUT OFF SERVICES IN YOUR HOME?: NO

## 2024-04-25 SDOH — ECONOMIC STABILITY: GENERAL

## 2024-04-25 ASSESSMENT — ACTIVITIES OF DAILY LIVING (ADL)
JUDGMENT_ADEQUATE_SAFELY_COMPLETE_DAILY_ACTIVITIES: YES
PATIENT'S MEMORY ADEQUATE TO SAFELY COMPLETE DAILY ACTIVITIES?: YES
LACK_OF_TRANSPORTATION: NO
TAKING_MEDICATION: INDEPENDENT
GROCERY_SHOPPING: INDEPENDENT
GROOMING: INDEPENDENT
LACK_OF_TRANSPORTATION: NO
DRESSING YOURSELF: INDEPENDENT
FEEDING YOURSELF: INDEPENDENT
LACK_OF_TRANSPORTATION: NO
ADEQUATE_TO_COMPLETE_ADL: YES
DOING_HOUSEWORK: INDEPENDENT
HEARING - LEFT EAR: FUNCTIONAL
TOILETING: INDEPENDENT
DRESSING: INDEPENDENT
WALKS IN HOME: INDEPENDENT
BATHING: INDEPENDENT
BATHING: INDEPENDENT
HEARING - RIGHT EAR: FUNCTIONAL
MANAGING_FINANCES: INDEPENDENT

## 2024-04-25 ASSESSMENT — PATIENT HEALTH QUESTIONNAIRE - PHQ9
2. FEELING DOWN, DEPRESSED OR HOPELESS: NOT AT ALL
1. LITTLE INTEREST OR PLEASURE IN DOING THINGS: NOT AT ALL
2. FEELING DOWN, DEPRESSED OR HOPELESS: NOT AT ALL
SUM OF ALL RESPONSES TO PHQ9 QUESTIONS 1 AND 2: 0
SUM OF ALL RESPONSES TO PHQ9 QUESTIONS 1 & 2: 0
1. LITTLE INTEREST OR PLEASURE IN DOING THINGS: NOT AT ALL

## 2024-04-25 ASSESSMENT — LIFESTYLE VARIABLES
HOW OFTEN DO YOU HAVE 6 OR MORE DRINKS ON ONE OCCASION: NEVER
SKIP TO QUESTIONS 9-10: 1
AUDIT-C TOTAL SCORE: 0
HOW MANY STANDARD DRINKS CONTAINING ALCOHOL DO YOU HAVE ON A TYPICAL DAY: PATIENT DOES NOT DRINK
AUDIT-C TOTAL SCORE: 0
HOW OFTEN DO YOU HAVE A DRINK CONTAINING ALCOHOL: NEVER

## 2024-04-25 ASSESSMENT — COGNITIVE AND FUNCTIONAL STATUS - GENERAL
MOBILITY SCORE: 24
CLIMB 3 TO 5 STEPS WITH RAILING: A LITTLE
DAILY ACTIVITIY SCORE: 20
DAILY ACTIVITIY SCORE: 24
TURNING FROM BACK TO SIDE WHILE IN FLAT BAD: A LITTLE
MOVING TO AND FROM BED TO CHAIR: A LITTLE
HELP NEEDED FOR BATHING: A LITTLE
DRESSING REGULAR LOWER BODY CLOTHING: A LITTLE
STANDING UP FROM CHAIR USING ARMS: A LITTLE
PATIENT BASELINE BEDBOUND: NO
WALKING IN HOSPITAL ROOM: A LITTLE
DRESSING REGULAR UPPER BODY CLOTHING: A LITTLE
TOILETING: A LITTLE
MOBILITY SCORE: 19

## 2024-04-25 ASSESSMENT — PAIN SCALES - GENERAL
PAINLEVEL_OUTOF10: 0 - NO PAIN

## 2024-04-25 ASSESSMENT — COLUMBIA-SUICIDE SEVERITY RATING SCALE - C-SSRS
2. HAVE YOU ACTUALLY HAD ANY THOUGHTS OF KILLING YOURSELF?: NO
6. HAVE YOU EVER DONE ANYTHING, STARTED TO DO ANYTHING, OR PREPARED TO DO ANYTHING TO END YOUR LIFE?: NO
1. IN THE PAST MONTH, HAVE YOU WISHED YOU WERE DEAD OR WISHED YOU COULD GO TO SLEEP AND NOT WAKE UP?: NO

## 2024-04-25 ASSESSMENT — ENCOUNTER SYMPTOMS
SHORTNESS OF BREATH: 1
PSYCHIATRIC NEGATIVE: 1
APPETITE CHANGE: 1
ARTHRALGIAS: 1
COUGH: 1
WHEEZING: 1
ABDOMINAL DISTENTION: 1
EYES NEGATIVE: 1
WEAKNESS: 1
FATIGUE: 1

## 2024-04-25 ASSESSMENT — PAIN - FUNCTIONAL ASSESSMENT
PAIN_FUNCTIONAL_ASSESSMENT: 0-10

## 2024-04-25 ASSESSMENT — SOCIAL DETERMINANTS OF HEALTH (SDOH): IN THE PAST 12 MONTHS, HAS THE ELECTRIC, GAS, OIL, OR WATER COMPANY THREATENED TO SHUT OFF SERVICE IN YOUR HOME?: NO

## 2024-04-25 NOTE — PROGRESS NOTES
"Pharmacy Medication History Review    Lexis Denny \"Alycia\" is a 74 y.o. female admitted for Atrial fibrillation with RVR (Multi). Pharmacy reviewed the patient's [id number]-[id number] medications and allergies for accuracy.    The list below reflectives the updated PTA list. Please review each medication in order reconciliation for additional clarification and justification.  Prior to Admission medications    Medication Sig Start Date End Date Taking? Authorizing Provider   cholecalciferol (Vitamin D-3) 50 MCG (2000 UT) tablet Take 1 tablet (50 mcg) by mouth once daily in the morning.   Yes Historical Provider, MD   fluticasone (Flonase Allergy Relief) 50 mcg/actuation nasal spray Administer 2 sprays into each nostril once daily. 8/3/23  Yes Historical Provider, MD   ibuprofen 200 mg tablet Take 1 tablet (200 mg) by mouth every 6 hours. 2/26/20  Yes Historical Provider, MD   loratadine 10 mg capsule Take by mouth.   Yes Historical Provider, MD   losartan (Cozaar) 25 mg tablet Take 1 tablet (25 mg) by mouth 2 times a day. 12/30/22  Yes Historical Provider, MD   multivit-min-folic acid-biotin (Women's Multivitamin w-Biotin) 200-300 mcg tablet,chewable Chew 1 tablet once daily. 2/26/20  Yes Historical Provider, MD   psyllium (Metamucil) 3.4 gram packet Take 1 packet by mouth once daily in the morning.   Yes Historical Provider, MD   flash glucose sensor kit (FreeStyle Gabbi 2 Sensor) kit Change the sensor every 14 days 2/13/24   OUMAR Taylor   tirzepatide (Mounjaro) 5 mg/0.5 mL pen injector Inject 5 mg under the skin 1 (one) time per week. 1/24/24   OUMAR Taylor   ergocalciferol (Vitamin D-2) 50 MCG (2000 UT) capsule capsule Take 1 capsule (50 mcg) by mouth once daily. 2/26/20 4/25/24  Historical Provider, MD   repaglinide (Prandin) 2 mg tablet Take up to 2 tablets, twice daily with meals. 11/30/23 4/25/24  OUMAR Taylor   turmeric root extract 500 mg tablet Take 2 tablets " by mouth once daily. 9/16/21 4/25/24  Historical Provider, MD   vitamin E acid succinate (vitamin E succinate) 268 mg (400 unit) tablet Take 1 tablet by mouth once daily. 2/26/20 4/25/24  Historical Provider, MD        The list below reflectives the updated allergy list. Please review each documented allergy for additional clarification and justification.  Allergies  Reviewed by Keli Nathan RN on 4/25/2024        Severity Reactions Comments    Aspirin Not Specified Other     Clarithromycin Not Specified Other     Metformin Not Specified GI Upset     Rosuvastatin Not Specified Unknown             Below are additional concerns with the patient's PTA list.      Afshan Gordon

## 2024-04-25 NOTE — ED PROVIDER NOTES
"Lexis Denny  74 y.o.    HPI  Presents to the ED with this of breath and elevated heart rate.  Sent in from the internal medicine office.  She was there with concern for cough and shortness of breath.  While there were noted to have a heart rate in the 150s.  EKG was obtained and it looked irregular so referred to the emergency department.  Brought in by her  and daughter who help provide the history.  Patient states that she had some ear pain and a cough over the past couple weeks.  Got prescribed an antibiotic and Mucinex.  Ear pain is better with the cough really never got better.  Check she reports has been coughing for months but it was worse over the past 2 weeks.  She denies any fever.  She states her whole body has been swollen over the past week or so.  She denies any chest pain.  No fevers in the past day or 2.  She is on week 12 of Mounjaro for her diabetes.  Has no history of atrial fibrillation or heart failure.  States she has been eating and drinking per usual.       Patient History   Past Medical History:   Diagnosis Date    Hyperlipidemia, unspecified 11/28/2022    Hyperlipidemia    Neuralgia and neuritis, unspecified 05/18/2022    Neuropathic pain of lower extremity, unspecified laterality    Type 2 diabetes mellitus without complications (Multi) 11/28/2022    Diabetes mellitus, type 2     History reviewed. No pertinent surgical history.  No family history on file.  Social History     Tobacco Use    Smoking status: Never    Smokeless tobacco: Never   Vaping Use    Vaping status: Never Used   Substance Use Topics    Alcohol use: Never    Drug use: Never       Physical Exam   Vitals:    04/25/24 1254   BP: (!) 168/133   Pulse: (!) 161   Resp: 20   Temp: 36.8 °C (98.2 °F)   SpO2: 94%   Weight: 116 kg (255 lb 1.2 oz)   Height: 1.626 m (5' 4\")        Constitutional: non-toxic; moderately distressed  Eyes: PERRL, Conjunctiva normal, No discharge  HEENT: Atraumatic. External ears appear " normal, Nose is without drainage, mouth is slightly dry, no intraoral lesions  Neck: Normal ROM, No lymphadenopathy, No stridor; voice is slightly hoarse  Cardiac: Tachycardic, irregular  Pulmonary/Chest: Increased respiratory rate with increased work of breathing, diminished lung sounds bilaterally, No chest tenderness  Abdomen: BS present, Soft, Non-tender without rebound/guarding  Back: No tenderness, No contusions  Extremities: Normal ROM. No tenderness, intact distal pulses.  Anasarca  Skin: Warm and dry, No rashes, No erythema  Neurologic: Alert and oriented x 3, Speech clear/fluent. Normal motor function, Normal sensation, No focal neurologic deficits  Psychiatric: Normal affect, Normal judgement, Normal mood      EKG interpreted by me:-year-old fibrillation with a rapid ventricular response rate of 155.  No ST segment elevation concerning for acute MI.  WV is 488.    ED Course & MDM     This is a 74-year-old female comes the emergency department as referred from the internal medicine office for shortness of breath and A-fib with a rapid ventricular response rate.  Patient has no history of A-fib.  Reports she has had a cough and some shortness of breath for the past week or so.  She reports her entire body has been swollen.  I saw the patient immediately upon arrival here.  Heart rate is in the 1 for that is irregular.  Oxygenation on room air is appropriate.  Blood pressure is elevated.  She is somewhat diffusely edematous.  EKG was obtained and does show atrial fibrillation.  Basic blood work was obtained.  Pt allergic to ASA  BNP elevated; CXR concerning for pulmonary edema  Given lasix and metroprolol. HR improved to 1teens. BP improved somewhat, although still elevated.   Case discussed with cardiology and hospitalist. Pt updated on findings and plan for hospitalization. Daughter and  are at the bedside.  15 minutes of critical care time was spent with this patient, exclusive of billable  procedures.         Impression   Diagnoses as of 04/25/24 1749   Atrial fibrillation with RVR (Multi)   Fluid overload     Disposition  Hospitalize      MD Leodan Bell MD  04/25/24 [id number]

## 2024-04-25 NOTE — CARE PLAN
The patient's goals for the shift include      The clinical goals for the shift include Pt will deny shortness of breath during this shift.    Over the shift, the patient did not make progress toward the following goals. Barriers to progression include the patient. Recommendations to address these barriers include educate pt on respiratory status.

## 2024-04-25 NOTE — H&P
History Of Present Illness  Alycia Denny is a 74 y.o. female presenting with fatigue and rapid heart rate. She states that she has been feeling ill for about x2 weeks. She was treated for an ear infection 2 weeks ago and took mucinex which made her feel anxious and agitated. She also had nasal congestion and a persistent cough and so went to her PCPs office today. At the PCPs office she was found to be in A fib with RVR and was sent to the ER. She denies light-headedness/dizziness. She does endorse SOB and fatigue over this time. In the ED CXR showed pulmonary edema and monitor was c/w A fib. She was admitted to the hospitalist service for further management.      Past Medical History  She has a past medical history of Hyperlipidemia, unspecified (11/28/2022), Neuralgia and neuritis, unspecified (05/18/2022), and Type 2 diabetes mellitus without complications (Multi) (11/28/2022).    Surgical History  She has no past surgical history on file.     Social History  She reports that she has never smoked. She has never used smokeless tobacco. She reports that she does not drink alcohol and does not use drugs.    Family History  No family history on file.     Allergies  Aspirin, Clarithromycin, Metformin, and Rosuvastatin    Review of Systems   All other systems reviewed and are negative.       Physical Exam  Vitals and nursing note reviewed.   Constitutional:       General: She is not in acute distress.     Appearance: She is not ill-appearing.   HENT:      Head: Normocephalic and atraumatic.      Mouth/Throat:      Mouth: Mucous membranes are moist.   Eyes:      Extraocular Movements: Extraocular movements intact.      Pupils: Pupils are equal, round, and reactive to light.   Cardiovascular:      Heart sounds: No murmur heard.     Comments: Irregular rate and rhythm  Pulmonary:      Effort: Pulmonary effort is normal.      Breath sounds: No wheezing or rhonchi.   Abdominal:      General: There is no distension.       Palpations: Abdomen is soft.      Tenderness: There is no abdominal tenderness.   Musculoskeletal:      Right lower leg: Edema (2+) present.      Left lower leg: Edema (2+) present.   Skin:     Coloration: Skin is not jaundiced.      Findings: No erythema.   Neurological:      General: No focal deficit present.      Mental Status: She is alert and oriented to person, place, and time.      Cranial Nerves: No cranial nerve deficit.      Motor: No weakness.   Psychiatric:         Mood and Affect: Mood normal.         Thought Content: Thought content normal.          Last Recorded Vitals  BP (!) 173/96   Pulse (!) 132   Temp 36.8 °C (98.2 °F)   Resp (!) 23   Wt 116 kg (255 lb 1.2 oz)   SpO2 (!) 93%     Relevant Results  Scheduled medications  [START ON 4/26/2024] furosemide, 20 mg, intravenous, q12h      Continuous medications     PRN medications      Results for orders placed or performed during the hospital encounter of 04/25/24 (from the past 24 hour(s))   ECG 12 lead   Result Value Ref Range    Ventricular Rate 155 BPM    QRS Duration 88 ms    QT Interval 304 ms    QTC Calculation(Bazett) 488 ms    R Axis 2 degrees    T Axis 4 degrees    QRS Count 25 beats    Q Onset 216 ms    T Offset 368 ms    QTC Fredericia 416 ms   CBC and Auto Differential   Result Value Ref Range    WBC 9.8 4.4 - 11.3 x10*3/uL    nRBC 0.0 0.0 - 0.0 /100 WBCs    RBC 4.70 4.00 - 5.20 x10*6/uL    Hemoglobin 12.9 12.0 - 16.0 g/dL    Hematocrit 41.8 36.0 - 46.0 %    MCV 89 80 - 100 fL    MCH 27.4 26.0 - 34.0 pg    MCHC 30.9 (L) 32.0 - 36.0 g/dL    RDW 15.6 (H) 11.5 - 14.5 %    Platelets 285 150 - 450 x10*3/uL    Neutrophils % 71.1 40.0 - 80.0 %    Immature Granulocytes %, Automated 0.3 0.0 - 0.9 %    Lymphocytes % 15.4 13.0 - 44.0 %    Monocytes % 11.8 2.0 - 10.0 %    Eosinophils % 0.7 0.0 - 6.0 %    Basophils % 0.7 0.0 - 2.0 %    Neutrophils Absolute 6.99 (H) 1.60 - 5.50 x10*3/uL    Immature Granulocytes Absolute, Automated 0.03 0.00 - 0.50  x10*3/uL    Lymphocytes Absolute 1.52 0.80 - 3.00 x10*3/uL    Monocytes Absolute 1.16 (H) 0.05 - 0.80 x10*3/uL    Eosinophils Absolute 0.07 0.00 - 0.40 x10*3/uL    Basophils Absolute 0.07 0.00 - 0.10 x10*3/uL   Basic metabolic panel   Result Value Ref Range    Glucose 130 (H) 74 - 99 mg/dL    Sodium 127 (L) 136 - 145 mmol/L    Potassium 4.0 3.5 - 5.3 mmol/L    Chloride 94 (L) 98 - 107 mmol/L    Bicarbonate 23 21 - 32 mmol/L    Anion Gap 14 10 - 20 mmol/L    Urea Nitrogen 15 6 - 23 mg/dL    Creatinine 0.78 0.50 - 1.05 mg/dL    eGFR 80 >60 mL/min/1.73m*2    Calcium 9.0 8.6 - 10.3 mg/dL   Phosphorus   Result Value Ref Range    Phosphorus 3.3 2.5 - 4.9 mg/dL   Lactate   Result Value Ref Range    Lactate 1.1 0.4 - 2.0 mmol/L   B-Type Natriuretic Peptide   Result Value Ref Range     (H) 0 - 99 pg/mL   Troponin I, High Sensitivity, Initial   Result Value Ref Range    Troponin I, High Sensitivity 13 0 - 13 ng/L       XR chest 1 view    Result Date: 4/25/2024  Interpreted By:  Jw York, STUDY: XR CHEST 1 VIEW; ;  4/25/2024 1:32 pm   INDICATION: Signs/Symptoms:afib RVR; SOB; r/o pulm edema.   COMPARISON: 06/25/2020 chest CT and 01/13/2014 chest radiograph   ACCESSION NUMBER(S): [id number]   ORDERING CLINICIAN: BAILEY SOLORIO   TECHNIQUE: AP upright portable chest 1:21 p.m.   FINDINGS: The cardiac silhouette is enlarged from the prior exams with mild pulmonary vascular congestion and haziness over the right lung base and to lesser degree left lung base consistent with pleural fluid. Underlying pneumonia is are not excluded.       Cardiomegaly with bilateral pleural effusions and pulmonary vascular congestion.     MACRO: None   Signed by: Jw York 4/25/2024 1:47 PM Dictation workstation:   [id number]    ECG 12 lead    Result Date: 4/25/2024  Atrial fibrillation with rapid ventricular response Low voltage QRS Nonspecific T wave abnormality Abnormal ECG When compared with ECG of 08-JUN-2020 12:16, Atrial  fibrillation has replaced Sinus rhythm Vent. rate has increased BY  84 BPM Nonspecific T wave abnormality now evident in Inferior leads Nonspecific T wave abnormality now evident in Lateral leads    ECG 12 lead (Clinic Performed)    Result Date: 4/25/2024  Atrial fibrillation 153 bpm        Assessment/Plan   Principal Problem:    Atrial fibrillation with RVR (Multi)      75 yo F admitted with new onset A fib with pulmonary edema. She is not hypoxic but does endorse shortness of breath.      A fib: in RVR,   - cards consulted in the ED  - CHADSVASc at least 4; will start heparin drip  - check TTE  - TSH    2. HTN: BP high since admission; resume home antiHTN and give PRNs as needed    3. HLD: cont home statin    4. Diabetes: hold home tirzepatide; start SSI and accuchecks    FEN: regular diet  Code: full  Dispo: telemetry floor for a fib with RVR       Silvano Jo, DO

## 2024-04-25 NOTE — PROGRESS NOTES
04/25/24 1553   Discharge Planning   Living Arrangements Spouse/significant other   Support Systems Spouse/significant other   Assistance Needed Home with spouse, A&0x3, independent with ADL's, no DME ( does have walker, cane, wheelchair available but does not use), drives occasionally, room air, no cpap or bipap, not active with any HHC   Type of Residence Private residence   Number of Stairs to Enter Residence 1   Number of Stairs Within Residence 1   Do you have animals or pets at home? No   Who is requesting discharge planning? Provider   Home or Post Acute Services Other (Comment)  (TBD)   Patient expects to be discharged to: Home with spouse, may want HHC upon d/c, requesting TCC to follow up closer to d/c for decision. HHC list provided and per patient if she decides on HHC would prefer [facility].   Does the patient need discharge transport arranged? No   Financial Resource Strain   How hard is it for you to pay for the very basics like food, housing, medical care, and heating? Not hard   Housing Stability   In the last 12 months, was there a time when you were not able to pay the mortgage or rent on time? N   In the last 12 months, how many places have you lived? 1   In the last 12 months, was there a time when you did not have a steady place to sleep or slept in a shelter (including now)? N   Transportation Needs   In the past 12 months, has lack of transportation kept you from medical appointments or from getting medications? no   In the past 12 months, has lack of transportation kept you from meetings, work, or from getting things needed for daily living? No   Patient Choice   Provider Choice list and CMS website (https://medicare.gov/care-compare#search) for post-acute Quality and Resource Measure Data were provided and reviewed with: Patient

## 2024-04-25 NOTE — ASSESSMENT & PLAN NOTE
Instructed patient to go directly to the emergency department for new onset atrial fibrillation with associated symptoms  EKG done in the office.  Resulted in atrial fibrillation heart rate 150s

## 2024-04-25 NOTE — PROGRESS NOTES
"Subjective   Patient ID: Alycia Denny is a 74 y.o. female who presents for generalized illness.      HPI   Came to the office today with her .  Only medical history stated is diabetes.  Cardiology history.  Arrival very unstable  Patient reports that she is very SOB.  Very weak and very swollen.  Hard for her to bend over.  She is just very uncomfortable and does not feel well.  Cannot breathe when she is lying flat.  Could not lie flat for EKG in the office  Recently treated for an ear infection by online provider which has cleared up however continues to have coughing jags.   She does take medications for blood pressure and diabetes.  Does have a continuous glucose monitor.  States she takes all of her medications as prescribed  Denies any chest pain, dizziness, palpitations.  EKG done in the office shows atrial fibrillation with RVR heart rate in 150s.    Review of Systems   Constitutional:  Positive for appetite change and fatigue.   HENT:  Positive for congestion.    Eyes: Negative.    Respiratory:  Positive for cough, shortness of breath and wheezing.    Cardiovascular:  Positive for leg swelling.   Gastrointestinal:  Positive for abdominal distention.   Genitourinary: Negative.    Musculoskeletal:  Positive for arthralgias.   Skin: Negative.    Neurological:  Positive for weakness.   Psychiatric/Behavioral: Negative.         Objective   BP (!) 170/130   Pulse (!) 153   Ht 1.638 m (5' 4.5\")   Wt 113 kg (250 lb)   SpO2 93%   BMI 42.25 kg/m²     Physical Exam  Constitutional:       General: She is not in acute distress.     Appearance: Normal appearance. She is obese. She is ill-appearing.   HENT:      Head: Normocephalic and atraumatic.      Right Ear: Tympanic membrane, ear canal and external ear normal.      Left Ear: Tympanic membrane, ear canal and external ear normal.      Nose: Nose normal.      Mouth/Throat:      Mouth: Mucous membranes are moist.      Pharynx: Oropharynx is clear.   Eyes: "      Extraocular Movements: Extraocular movements intact.      Conjunctiva/sclera: Conjunctivae normal.      Pupils: Pupils are equal, round, and reactive to light.   Cardiovascular:      Rate and Rhythm: Tachycardia present. Rhythm irregularly irregular.      Pulses: Normal pulses.      Heart sounds: Normal heart sounds. No murmur heard.     Comments: Generalized whole body edema  Pulmonary:      Effort: Pulmonary effort is normal.      Breath sounds: Decreased air movement present. Examination of the right-lower field reveals decreased breath sounds, wheezing and rales. Examination of the left-lower field reveals decreased breath sounds and wheezing. Decreased breath sounds, wheezing and rales present. No rhonchi.   Abdominal:      General: Abdomen is protuberant. Bowel sounds are normal.      Palpations: Abdomen is soft.      Tenderness: There is no abdominal tenderness.   Musculoskeletal:         General: Normal range of motion.      Cervical back: Normal range of motion and neck supple.      Right lower le+ Pitting Edema present.      Left lower le+ Pitting Edema present.   Lymphadenopathy:      Comments: No lymphadenopathy noted   Skin:     General: Skin is warm and dry.      Findings: No rash.   Neurological:      General: No focal deficit present.      Mental Status: She is alert and oriented to person, place, and time.      Cranial Nerves: No cranial nerve deficit.      Coordination: Coordination normal.      Gait: Gait normal.   Psychiatric:         Attention and Perception: Attention and perception normal.         Mood and Affect: Mood normal.         Speech: Speech normal.         Behavior: Behavior normal.         Cognition and Memory: Cognition normal.         Assessment/Plan   Problem List Items Addressed This Visit             ICD-10-CM    Cough R05.9    Shortness of breath at rest R06.02    Atrial fibrillation with rapid ventricular response (Multi) - Primary I48.91     Instructed patient to  go directly to the emergency department for new onset atrial fibrillation with associated symptoms  EKG done in the office.  Resulted in atrial fibrillation heart rate 150s           Other Visit Diagnoses         Codes    Routine general medical examination at health care facility     Z00.00        Patient is stable enough to travel to ED by car  Instructed patient and her  to go immediately to the emergency department [facility].  Called and spoke with Dr. Alcaraz and let her know patient was coming from the office reported pt symptoms and patient's status.  Aware she is coming by car  Follow-up after hospital stay

## 2024-04-26 ENCOUNTER — APPOINTMENT (OUTPATIENT)
Dept: RADIOLOGY | Facility: HOSPITAL | Age: 74
DRG: 309 | End: 2024-04-26
Payer: MEDICARE

## 2024-04-26 ENCOUNTER — APPOINTMENT (OUTPATIENT)
Dept: CARDIOLOGY | Facility: HOSPITAL | Age: 74
DRG: 309 | End: 2024-04-26
Payer: MEDICARE

## 2024-04-26 LAB
ANION GAP SERPL CALC-SCNC: 11 MMOL/L (ref 10–20)
AORTIC VALVE MEAN GRADIENT: 7.5 MMHG
AORTIC VALVE PEAK VELOCITY: 1.76 M/S
AV PEAK GRADIENT: 12.4 MMHG
BUN SERPL-MCNC: 15 MG/DL (ref 6–23)
CALCIUM SERPL-MCNC: 8.5 MG/DL (ref 8.6–10.3)
CHLORIDE SERPL-SCNC: 93 MMOL/L (ref 98–107)
CO2 SERPL-SCNC: 27 MMOL/L (ref 21–32)
CREAT SERPL-MCNC: 0.76 MG/DL (ref 0.5–1.05)
EGFRCR SERPLBLD CKD-EPI 2021: 82 ML/MIN/1.73M*2
EJECTION FRACTION APICAL 4 CHAMBER: 46
ERYTHROCYTE [DISTWIDTH] IN BLOOD BY AUTOMATED COUNT: 15.6 % (ref 11.5–14.5)
ERYTHROCYTE [DISTWIDTH] IN BLOOD BY AUTOMATED COUNT: 15.9 % (ref 11.5–14.5)
GLUCOSE BLD MANUAL STRIP-MCNC: 117 MG/DL (ref 74–99)
GLUCOSE BLD MANUAL STRIP-MCNC: 127 MG/DL (ref 74–99)
GLUCOSE BLD MANUAL STRIP-MCNC: 131 MG/DL (ref 74–99)
GLUCOSE BLD MANUAL STRIP-MCNC: 149 MG/DL (ref 74–99)
GLUCOSE SERPL-MCNC: 113 MG/DL (ref 74–99)
HCT VFR BLD AUTO: 37.2 % (ref 36–46)
HCT VFR BLD AUTO: 37.5 % (ref 36–46)
HGB BLD-MCNC: 11.5 G/DL (ref 12–16)
HGB BLD-MCNC: 11.5 G/DL (ref 12–16)
LEFT ATRIUM VOLUME AREA LENGTH INDEX BSA: 39.3 ML/M2
LEFT VENTRICLE INTERNAL DIMENSION DIASTOLE: 5.25 CM (ref 3.5–6)
LV EJECTION FRACTION BIPLANE: 44 %
MCH RBC QN AUTO: 27.4 PG (ref 26–34)
MCH RBC QN AUTO: 27.6 PG (ref 26–34)
MCHC RBC AUTO-ENTMCNC: 30.7 G/DL (ref 32–36)
MCHC RBC AUTO-ENTMCNC: 30.9 G/DL (ref 32–36)
MCV RBC AUTO: 89 FL (ref 80–100)
MCV RBC AUTO: 89 FL (ref 80–100)
MITRAL VALVE E/A RATIO: 70.95
MITRAL VALVE E/E' RATIO: 21.43
NRBC BLD-RTO: 0 /100 WBCS (ref 0–0)
NRBC BLD-RTO: 0 /100 WBCS (ref 0–0)
PLATELET # BLD AUTO: 260 X10*3/UL (ref 150–450)
PLATELET # BLD AUTO: 263 X10*3/UL (ref 150–450)
POTASSIUM SERPL-SCNC: 3.9 MMOL/L (ref 3.5–5.3)
Q ONSET: 216 MS
QRS COUNT: 25 BEATS
QRS DURATION: 88 MS
QT INTERVAL: 304 MS
QTC CALCULATION(BAZETT): 488 MS
QTC FREDERICIA: 416 MS
R AXIS: 2 DEGREES
RBC # BLD AUTO: 4.16 X10*6/UL (ref 4–5.2)
RBC # BLD AUTO: 4.2 X10*6/UL (ref 4–5.2)
RIGHT VENTRICLE FREE WALL PEAK S': 7 CM/S
RIGHT VENTRICLE PEAK SYSTOLIC PRESSURE: 49.3 MMHG
SODIUM SERPL-SCNC: 127 MMOL/L (ref 136–145)
T AXIS: 4 DEGREES
T OFFSET: 368 MS
TRICUSPID ANNULAR PLANE SYSTOLIC EXCURSION: 1.6 CM
UFH PPP CHRO-ACNC: 0.7 IU/ML
UFH PPP CHRO-ACNC: 0.9 IU/ML
UFH PPP CHRO-ACNC: 1 IU/ML
VENTRICULAR RATE: 155 BPM
WBC # BLD AUTO: 7.9 X10*3/UL (ref 4.4–11.3)
WBC # BLD AUTO: 8.3 X10*3/UL (ref 4.4–11.3)

## 2024-04-26 PROCEDURE — 36415 COLL VENOUS BLD VENIPUNCTURE: CPT | Performed by: INTERNAL MEDICINE

## 2024-04-26 PROCEDURE — 82374 ASSAY BLOOD CARBON DIOXIDE: CPT | Performed by: INTERNAL MEDICINE

## 2024-04-26 PROCEDURE — 82947 ASSAY GLUCOSE BLOOD QUANT: CPT

## 2024-04-26 PROCEDURE — 71275 CT ANGIOGRAPHY CHEST: CPT

## 2024-04-26 PROCEDURE — 99232 SBSQ HOSP IP/OBS MODERATE 35: CPT | Performed by: INTERNAL MEDICINE

## 2024-04-26 PROCEDURE — 93306 TTE W/DOPPLER COMPLETE: CPT | Performed by: STUDENT IN AN ORGANIZED HEALTH CARE EDUCATION/TRAINING PROGRAM

## 2024-04-26 PROCEDURE — 85520 HEPARIN ASSAY: CPT | Performed by: INTERNAL MEDICINE

## 2024-04-26 PROCEDURE — 2500000004 HC RX 250 GENERAL PHARMACY W/ HCPCS (ALT 636 FOR OP/ED): Performed by: INTERNAL MEDICINE

## 2024-04-26 PROCEDURE — 2550000001 HC RX 255 CONTRASTS: Performed by: INTERNAL MEDICINE

## 2024-04-26 PROCEDURE — 71275 CT ANGIOGRAPHY CHEST: CPT | Performed by: RADIOLOGY

## 2024-04-26 PROCEDURE — 2500000002 HC RX 250 W HCPCS SELF ADMINISTERED DRUGS (ALT 637 FOR MEDICARE OP, ALT 636 FOR OP/ED): Performed by: INTERNAL MEDICINE

## 2024-04-26 PROCEDURE — 93306 TTE W/DOPPLER COMPLETE: CPT

## 2024-04-26 PROCEDURE — 2500000004 HC RX 250 GENERAL PHARMACY W/ HCPCS (ALT 636 FOR OP/ED): Performed by: PHYSICIAN ASSISTANT

## 2024-04-26 PROCEDURE — 2060000001 HC INTERMEDIATE ICU ROOM DAILY

## 2024-04-26 PROCEDURE — 2500000001 HC RX 250 WO HCPCS SELF ADMINISTERED DRUGS (ALT 637 FOR MEDICARE OP): Performed by: PHYSICIAN ASSISTANT

## 2024-04-26 PROCEDURE — 2500000001 HC RX 250 WO HCPCS SELF ADMINISTERED DRUGS (ALT 637 FOR MEDICARE OP): Performed by: INTERNAL MEDICINE

## 2024-04-26 PROCEDURE — 85027 COMPLETE CBC AUTOMATED: CPT | Performed by: INTERNAL MEDICINE

## 2024-04-26 RX ORDER — FUROSEMIDE 10 MG/ML
40 INJECTION INTRAMUSCULAR; INTRAVENOUS EVERY 12 HOURS
Status: DISCONTINUED | OUTPATIENT
Start: 2024-04-26 | End: 2024-04-28

## 2024-04-26 RX ORDER — MORPHINE SULFATE 2 MG/ML
2 INJECTION, SOLUTION INTRAMUSCULAR; INTRAVENOUS ONCE
Status: DISCONTINUED | OUTPATIENT
Start: 2024-04-26 | End: 2024-04-26

## 2024-04-26 RX ORDER — FLUTICASONE PROPIONATE 50 MCG
2 SPRAY, SUSPENSION (ML) NASAL DAILY
Status: DISCONTINUED | OUTPATIENT
Start: 2024-04-26 | End: 2024-04-29 | Stop reason: HOSPADM

## 2024-04-26 RX ORDER — ACETAMINOPHEN 325 MG/1
650 TABLET ORAL EVERY 4 HOURS PRN
Status: DISCONTINUED | OUTPATIENT
Start: 2024-04-26 | End: 2024-04-29 | Stop reason: HOSPADM

## 2024-04-26 RX ORDER — ONDANSETRON 4 MG/1
4 TABLET, FILM COATED ORAL EVERY 8 HOURS PRN
Status: DISCONTINUED | OUTPATIENT
Start: 2024-04-26 | End: 2024-04-29 | Stop reason: HOSPADM

## 2024-04-26 RX ORDER — DOCUSATE SODIUM 100 MG/1
100 CAPSULE, LIQUID FILLED ORAL 2 TIMES DAILY PRN
Status: DISCONTINUED | OUTPATIENT
Start: 2024-04-26 | End: 2024-04-29 | Stop reason: HOSPADM

## 2024-04-26 RX ORDER — ERGOCALCIFEROL (VITAMIN D2) 50 MCG
50 CAPSULE ORAL DAILY
Status: DISCONTINUED | OUTPATIENT
Start: 2024-04-26 | End: 2024-04-26

## 2024-04-26 RX ORDER — CHOLECALCIFEROL (VITAMIN D3) 25 MCG
2000 TABLET ORAL DAILY
Status: DISCONTINUED | OUTPATIENT
Start: 2024-04-26 | End: 2024-04-29 | Stop reason: HOSPADM

## 2024-04-26 RX ORDER — IBUPROFEN 200 MG
200 TABLET ORAL EVERY 6 HOURS
Status: DISCONTINUED | OUTPATIENT
Start: 2024-04-26 | End: 2024-04-28

## 2024-04-26 RX ORDER — LOSARTAN POTASSIUM 50 MG/1
25 TABLET ORAL 2 TIMES DAILY
Status: DISCONTINUED | OUTPATIENT
Start: 2024-04-26 | End: 2024-04-29 | Stop reason: HOSPADM

## 2024-04-26 RX ORDER — LORATADINE 10 MG/1
10 TABLET ORAL DAILY
Status: DISCONTINUED | OUTPATIENT
Start: 2024-04-26 | End: 2024-04-29 | Stop reason: HOSPADM

## 2024-04-26 RX ORDER — ACETAMINOPHEN 160 MG/5ML
650 SOLUTION ORAL EVERY 4 HOURS PRN
Status: DISCONTINUED | OUTPATIENT
Start: 2024-04-26 | End: 2024-04-29 | Stop reason: HOSPADM

## 2024-04-26 RX ORDER — ACETAMINOPHEN 650 MG/1
650 SUPPOSITORY RECTAL EVERY 4 HOURS PRN
Status: DISCONTINUED | OUTPATIENT
Start: 2024-04-26 | End: 2024-04-29 | Stop reason: HOSPADM

## 2024-04-26 RX ORDER — POLYETHYLENE GLYCOL 3350 17 G/17G
17 POWDER, FOR SOLUTION ORAL DAILY PRN
Status: DISCONTINUED | OUTPATIENT
Start: 2024-04-26 | End: 2024-04-29 | Stop reason: HOSPADM

## 2024-04-26 RX ORDER — ONDANSETRON HYDROCHLORIDE 2 MG/ML
4 INJECTION, SOLUTION INTRAVENOUS EVERY 8 HOURS PRN
Status: DISCONTINUED | OUTPATIENT
Start: 2024-04-26 | End: 2024-04-29 | Stop reason: HOSPADM

## 2024-04-26 RX ORDER — METOPROLOL TARTRATE 25 MG/1
25 TABLET, FILM COATED ORAL EVERY 6 HOURS
Status: DISCONTINUED | OUTPATIENT
Start: 2024-04-26 | End: 2024-04-27

## 2024-04-26 RX ADMIN — FUROSEMIDE 40 MG: 10 INJECTION, SOLUTION INTRAVENOUS at 20:29

## 2024-04-26 RX ADMIN — Medication 5 MG/HR: at 10:22

## 2024-04-26 RX ADMIN — IBUPROFEN 200 MG: 200 TABLET, FILM COATED ORAL at 02:44

## 2024-04-26 RX ADMIN — IBUPROFEN 200 MG: 200 TABLET, FILM COATED ORAL at 10:22

## 2024-04-26 RX ADMIN — LORATADINE 10 MG: 10 TABLET ORAL at 10:22

## 2024-04-26 RX ADMIN — HEPARIN SODIUM 1800 UNITS/HR: 10000 INJECTION, SOLUTION INTRAVENOUS at 05:23

## 2024-04-26 RX ADMIN — FLUTICASONE PROPIONATE 2 SPRAY: 50 SPRAY, METERED NASAL at 09:00

## 2024-04-26 RX ADMIN — HEPARIN SODIUM 1600 UNITS/HR: 10000 INJECTION, SOLUTION INTRAVENOUS at 20:11

## 2024-04-26 RX ADMIN — IBUPROFEN 200 MG: 200 TABLET, FILM COATED ORAL at 20:27

## 2024-04-26 RX ADMIN — Medication 2000 UNITS: at 10:30

## 2024-04-26 RX ADMIN — LOSARTAN POTASSIUM 25 MG: 50 TABLET, FILM COATED ORAL at 10:22

## 2024-04-26 RX ADMIN — LOSARTAN POTASSIUM 25 MG: 50 TABLET, FILM COATED ORAL at 20:28

## 2024-04-26 RX ADMIN — FUROSEMIDE 40 MG: 10 INJECTION, SOLUTION INTRAVENOUS at 10:21

## 2024-04-26 RX ADMIN — METOPROLOL TARTRATE 25 MG: 25 TABLET, FILM COATED ORAL at 20:27

## 2024-04-26 RX ADMIN — IOHEXOL 75 ML: 350 INJECTION, SOLUTION INTRAVENOUS at 12:19

## 2024-04-26 RX ADMIN — PERFLUTREN 1 ML OF DILUTION: 6.52 INJECTION, SUSPENSION INTRAVENOUS at 09:46

## 2024-04-26 RX ADMIN — HEPARIN SODIUM 1600 UNITS/HR: 10000 INJECTION, SOLUTION INTRAVENOUS at 17:02

## 2024-04-26 RX ADMIN — METOPROLOL TARTRATE 25 MG: 25 TABLET, FILM COATED ORAL at 13:07

## 2024-04-26 ASSESSMENT — COGNITIVE AND FUNCTIONAL STATUS - GENERAL
STANDING UP FROM CHAIR USING ARMS: A LITTLE
CLIMB 3 TO 5 STEPS WITH RAILING: A LITTLE
CLIMB 3 TO 5 STEPS WITH RAILING: A LITTLE
MOBILITY SCORE: 20
WALKING IN HOSPITAL ROOM: A LITTLE
MOVING TO AND FROM BED TO CHAIR: A LITTLE
DRESSING REGULAR LOWER BODY CLOTHING: A LITTLE
DRESSING REGULAR UPPER BODY CLOTHING: A LITTLE
TOILETING: A LITTLE
HELP NEEDED FOR BATHING: A LITTLE
DRESSING REGULAR UPPER BODY CLOTHING: A LITTLE
DRESSING REGULAR LOWER BODY CLOTHING: A LITTLE
HELP NEEDED FOR BATHING: A LITTLE
DAILY ACTIVITIY SCORE: 19
DAILY ACTIVITIY SCORE: 20
MOVING TO AND FROM BED TO CHAIR: A LITTLE
MOVING FROM LYING ON BACK TO SITTING ON SIDE OF FLAT BED WITH BEDRAILS: A LITTLE
TURNING FROM BACK TO SIDE WHILE IN FLAT BAD: A LITTLE
STANDING UP FROM CHAIR USING ARMS: A LITTLE
PERSONAL GROOMING: A LITTLE
WALKING IN HOSPITAL ROOM: A LITTLE
MOBILITY SCORE: 18
TOILETING: A LITTLE

## 2024-04-26 ASSESSMENT — PAIN SCALES - GENERAL
PAINLEVEL_OUTOF10: 3
PAINLEVEL_OUTOF10: 6

## 2024-04-26 ASSESSMENT — PAIN - FUNCTIONAL ASSESSMENT
PAIN_FUNCTIONAL_ASSESSMENT: 0-10
PAIN_FUNCTIONAL_ASSESSMENT: 0-10

## 2024-04-26 NOTE — PROGRESS NOTES
04/26/24 1552   Discharge Planning   Living Arrangements Spouse/significant other   Support Systems Spouse/significant other   Assistance Needed Home with spouse, A&0x3, independent with ADL's, no DME ( does have walker, cane, wheelchair available but does not use), drives occasionally, room air, no cpap or bipap, not active with any HHC   Type of Residence Private residence   Number of Stairs to Enter Residence 1   Number of Stairs Within Residence 1   Do you have animals or pets at home? No   Who is requesting discharge planning? Provider   Home or Post Acute Services In home services   Type of Home Care Services Home nursing visits;Home OT;Home PT;Other (Comment)  (social work)   Patient expects to be discharged to: Home with spouse and new [facility] for SN, PT, OT SW and new  [facility]. Request made to provider to send referral for both. Daughter concerned about living environment ( hoarding), mother resistant to leave the house for any appointments. Inquiring into any geropsych treatment that offers virtual appointments. Discused wt SW who will reach out to daughter to discuss options.   Does the patient need discharge transport arranged? No   Patient Choice   Provider Choice list and CMS website (https://medicare.gov/care-compare#search) for post-acute Quality and Resource Measure Data were provided and reviewed with: Family;Patient     4/26/24 @ 1605: Unsure of anticoagulation needs on discharge, may need cost checked once determined

## 2024-04-26 NOTE — PROGRESS NOTES
Alycia Denny is a 74 y.o. female on day 1 of admission presenting with Atrial fibrillation with RVR (Multi).      Subjective   Feeling better today, very grateful for all the help she has gotten since admission. No acute events overnight.          Objective     Last Recorded Vitals  BP (!) 150/92 (BP Location: Right arm, Patient Position: Lying)   Pulse 92   Temp 37.1 °C (98.8 °F) (Temporal)   Resp 21   Wt 115 kg (254 lb 6.6 oz)   SpO2 (!) 93%   Intake/Output last 3 Shifts:    Intake/Output Summary (Last 24 hours) at 4/26/2024 1534  Last data filed at 4/26/2024 1521  Gross per 24 hour   Intake 884.29 ml   Output 2150 ml   Net -1265.71 ml       Admission Weight  Weight: 116 kg (255 lb 1.2 oz) (04/25/24 1254)    Daily Weight  04/25/24 : 115 kg (254 lb 6.6 oz)    Image Results  CT angio chest for pulmonary embolism  Narrative: Interpreted By:  Bryon Spear,   STUDY:  CT ANGIO CHEST FOR PULMONARY EMBOLISM;  4/26/2024 12:15 pm      INDICATION:  Signs/Symptoms:SOB.      COMPARISON:  None.      ACCESSION NUMBER(S):  [id number]      ORDERING CLINICIAN:  RYAN BEARD      TECHNIQUE:  Helical data acquisition of the chest was obtained with 75 mL  Omnipaque 350. Images were reformatted in coronal and sagittal  planes. Axial and coronal MIP images were created and reviewed.      FINDINGS:  POTENTIAL LIMITATIONS OF THE STUDY: None      HEART AND VESSELS:  No discrete filling defects within the main pulmonary artery or its  branches.      Main pulmonary artery and its branches are normal in caliber.      The thoracic aorta is of normal course and caliber with minimal  vascular calcifications.      No coronary artery calcifications are seen.The study is not optimized  for evaluation of coronary arteries.      The cardiac chambers are not enlarged.      No evidence of pericardial effusion.      MEDIASTINUM AND [place], LOWER NECK AND AXILLA:  The visualized thyroid gland is within normal limits.      No evidence of  thoracic lymphadenopathy by CT criteria.      Esophagus appears within normal limits as seen.      LUNGS AND AIRWAYS:  The trachea and central airways are patent. No endobronchial lesion.      Moderate layering bilateral effusions. Atelectatic changes within the  bilateral lower lobes.      UPPER ABDOMEN:  Trace ascites.      CHEST WALL AND OSSEOUS STRUCTURES:  There are no suspicious osseous lesions. Multilevel degenerative  changes are present      Impression: 1.  No pulmonary emboli.  2. Bilateral layering moderate pleural effusions with adjacent  atelectatic changes within the lower lobes.  3. Trace ascites visualized along the hepatic margin.          MACRO:  None      Signed by: Bryon Spear 4/26/2024 12:37 PM  Dictation workstation:   [id number]  Transthoracic Echo (TTE) Complete     [facility], [address] [place] [address]                Tel [phone] and Fax [phone]    TRANSTHORACIC ECHOCARDIOGRAM REPORT       Patient Name:      NURY HUDSON    Reading Physician:    [id number] Lyric Grimaldo MD  Study Date:        4/26/2024            Ordering Provider:    [id number] NEERAJ WALSH  MRN/PID:           [id number]             Fellow:  Accession#:        [id number]         Nurse:                Racquel Mckay RN  Date of Birth/Age: 1950 / 74 years Sonographer:          Mirna Tristan RDCS  Gender:            F                    Additional Staff:  Height:            162.56 cm            Admit Date:           4/25/2024  Weight:            115.21 kg            Admission Status:     Inpatient -                                                                Routine  BSA / BMI:         2.17 m2 / 43.60      Encounter#:           [id number]                     kg/m2                                          Department Location:  [facility]                                                                 Invasive  Blood Pressure: 124 /95 mmHg    Study Type:    TRANSTHORACIC ECHO (TTE) COMPLETE  Diagnosis/ICD: Unspecified atrial fibrillation-I48.91; Heart failure,                 unspecified-I50.9  Indication:    Atrial Fibrillation, Congestive Heart Failure  CPT Code:      Echo Complete w Full Doppler-96120    Patient History:  Diabetes:          Yes  Pertinent History: A-Fib, Dyspnea and Hyperlipidemia.    Study Detail: The following Echo studies were performed: 2D, M-Mode, Doppler and                color flow. Technically challenging study due to body habitus and                prominent lung artifact. Definity used as a contrast agent for                endocardial border definition. Total contrast used for this                procedure was 1.0 mL via IV push. The patient was awake.       PHYSICIAN INTERPRETATION:  Left Ventricle: The left ventricular systolic function is mildly decreased, with an estimated ejection fraction of 40-45%. The patient is in atrial fibrillation which may influence the estimate of left ventricular function and transvalvular flows. The calculated ejection fraction is mildly decreased at 44 % using the Carrington's Bi-plane MOD calculation. There is global hypokinesis of the left ventricle with minor regional variations. The left ventricular cavity size is normal. The interventricular septum is flattened in diastole ('D' shaped left ventricle), consistent with right ventricular volume overload. Left ventricular diastolic filling was indeterminate.  Left Atrium: The left atrium is normal in size.  Right Ventricle: The right ventricle is normal in size. There is mildly reduced right ventricular systolic function.  Right Atrium: The right atrium is mildly dilated.  Aortic Valve: The aortic valve is probably trileaflet. There is mild to moderate aortic valve cusp calcification. There is no evidence of aortic valve regurgitation. The peak  instantaneous gradient of the aortic valve is 12.4 mmHg. The mean gradient of the aortic valve is 7.5 mmHg.  Mitral Valve: The mitral valve is normal in structure. There is mild mitral annular calcification. There is trace to mild mitral valve regurgitation.  Tricuspid Valve: The tricuspid valve is structurally normal. There is trace to mild tricuspid regurgitation. The Doppler estimated RVSP is mild to moderately elevated at 49.3 mmHg.  Pulmonic Valve: The pulmonic valve is not well visualized. There is physiologic pulmonic valve regurgitation.  Pericardium: There is no pericardial effusion noted.  Aorta: The aortic root is normal.  Systemic Veins: The inferior vena cava appears to be of normal size. There is less than 50% IVC collapse with inspiration.  In comparison to the previous echocardiogram(s): There are no prior studies on this patient for comparison purposes.       CONCLUSIONS:   1. Left ventricular systolic function is mildly decreased with a 40-45% estimated ejection fraction.   2. Right ventricular volume overload.   3. There is mildly reduced right ventricular systolic function.   4. Mild to moderately elevated right ventricular systolic pressure.   5. The patient is in atrial fibrillation which may influence the estimate of left ventricular function and transvalvular flows.   6. There is global hypokinesis of the left ventricle with minor regional variations.    QUANTITATIVE DATA SUMMARY:  2D MEASUREMENTS:                           Normal Ranges:  IVSd:          0.81 cm   (0.6-1.1cm)  LVPWd:         0.77 cm   (0.6-1.1cm)  LVIDd:         5.25 cm   (3.9-5.9cm)  LVIDs:         4.02 cm  LV Mass Index: 67.2 g/m2  LV % FS        23.3 %    LA VOLUME:                                Normal Ranges:  LA Vol A4C:        87.2 ml    (22+/-6mL/m2)  LA Vol A2C:        77.1 ml  LA Vol BP:         85.1 ml  LA Vol Index A4C:  40.3ml/m2  LA Vol Index A2C:  35.6 ml/m2  LA Vol Index BP:   39.3 ml/m2  LA Area A4C:        26.6 cm2  LA Area A2C:       24.1 cm2  LA Major Axis A4C: 6.9 cm  LA Major Axis A2C: 6.4 cm  LA Volume Index:   39.1 ml/m2  LA Vol A4C:        81.5 ml  LA Vol A2C:        75.0 ml    RA VOLUME BY A/L METHOD:                        Normal Ranges:  RA Area A4C: 24.0 cm2    M-MODE MEASUREMENTS:                   Normal Ranges:  Ao Root: 2.80 cm (2.0-3.7cm)  LAs:     4.35 cm (2.7-4.0cm)    AORTA MEASUREMENTS:                       Normal Ranges:  Ao Sinus, d: 2.90 cm (2.1-3.5cm)  Asc Ao, d:   3.10 cm (2.1-3.4cm)    LV SYSTOLIC FUNCTION BY 2D PLANIMETRY (MOD):                      Normal Ranges:  EF-A4C View: 46.0 % (>=55%)  EF-A2C View: 44.9 %  EF-Biplane:  44.4 %    LV DIASTOLIC FUNCTION:                             Normal Ranges:  MV Peak E:      1.50 m/s   (0.7-1.2 m/s)  MV Peak A:      0.02 m/s   (0.42-0.7 m/s)  E/A Ratio:      70.95      (1.0-2.2)  MV e'           0.07 m/s   (>8.0)  MV lateral e'   0.07 m/s  MV medial e'    0.06 m/s  E/e' Ratio:     21.43      (<8.0)  PulmV Sys John:  36.27 cm/s  PulmV Griffin John: 62.69 cm/s  PulmV S/D John:  0.58    MITRAL VALVE:                        Normal Ranges:  MV Vmax:    1.60 m/s  (<=1.3m/s)  MV peak PG: 10.2 mmHg (<5mmHg)  MV mean PG: 3.7 mmHg  (<2mmHg)  MV VTI:     31.58 cm  (10-13cm)  MV DT:      140 msec  (150-240msec)    AORTIC VALVE:                                     Normal Ranges:  AoV Vmax:                1.76 m/s  (<=1.7m/s)  AoV Peak P.4 mmHg (<20mmHg)  AoV Mean P.5 mmHg  (1.7-11.5mmHg)  LVOT Max John:            1.41 m/s  (<=1.1m/s)  AoV VTI:                 32.05 cm  (18-25cm)  LVOT VTI:                25.68 cm  AoV Dimensionless Index: 0.80       RIGHT VENTRICLE:  RV Basal 3.70 cm  RV Mid   2.10 cm  RV Major 7.9 cm  TAPSE:   16.0 mm  RV s'    0.07 m/s    TRICUSPID VALVE/RVSP:                              Normal Ranges:  Peak TR Velocity: 3.21 m/s  RV Syst Pressure: 49.3 mmHg (< 30mmHg)  IVC Diam:         2.05 cm    PULMONIC  VALVE:                       Normal Ranges:  PV Max John: 0.8 m/s  (0.6-0.9m/s)  PV Max P.3 mmHg    Pulmonary Veins:  PulmV Griffin John: 62.69 cm/s  PulmV S/D John:  0.58  PulmV Sys John:  36.27 cm/s    AORTA:  Asc Ao Diam 3.10 cm       [id number] Lyric Grimaldo MD  Electronically signed on 2024 at 10:38:37 AM       ** Final **  ECG 12 lead  Atrial fibrillation with rapid ventricular response  Low voltage QRS  Nonspecific T wave abnormality  Abnormal ECG  When compared with ECG of 2020 12:16,  Atrial fibrillation has replaced Sinus rhythm  Vent. rate has increased BY  84 BPM  Nonspecific T wave abnormality now evident in Inferior leads  Nonspecific T wave abnormality now evident in Lateral leads  See ED provider note for full interpretation and clinical correlation  Confirmed by Ginger Woods ([id number]) on 2024 10:34:05 AM      Physical Exam    Relevant Results  Scheduled medications  cholecalciferol, 2,000 Units, oral, Daily  fluticasone, 2 spray, Each Nostril, Daily  furosemide, 40 mg, intravenous, q12h  heparin, 80 Units/kg, intravenous, Once  ibuprofen, 200 mg, oral, q6h  loratadine, 10 mg, oral, Daily  losartan, 25 mg, oral, BID  metoprolol tartrate, 25 mg, oral, q6h      Continuous medications  heparin, 0-4,500 Units/hr, Last Rate: 1,800 Units/hr (24 1149)      PRN medications  PRN medications: acetaminophen **OR** acetaminophen **OR** acetaminophen, docusate sodium, heparin, metoprolol, ondansetron **OR** ondansetron, polyethylene glycol    Results for orders placed or performed during the hospital encounter of 24 (from the past 24 hour(s))   POCT GLUCOSE   Result Value Ref Range    POCT Glucose 151 (H) 74 - 99 mg/dL   Heparin Assay, UFH   Result Value Ref Range    Heparin Unfractionated 0.5 See Comment Below for Therapeutic Ranges IU/mL   Heparin Assay, UFH   Result Value Ref Range    Heparin Unfractionated 0.7 See Comment Below for Therapeutic Ranges IU/mL   CBC   Result Value Ref  Range    WBC 8.3 4.4 - 11.3 x10*3/uL    nRBC 0.0 0.0 - 0.0 /100 WBCs    RBC 4.20 4.00 - 5.20 x10*6/uL    Hemoglobin 11.5 (L) 12.0 - 16.0 g/dL    Hematocrit 37.5 36.0 - 46.0 %    MCV 89 80 - 100 fL    MCH 27.4 26.0 - 34.0 pg    MCHC 30.7 (L) 32.0 - 36.0 g/dL    RDW 15.6 (H) 11.5 - 14.5 %    Platelets 260 150 - 450 x10*3/uL   Basic metabolic panel   Result Value Ref Range    Glucose 113 (H) 74 - 99 mg/dL    Sodium 127 (L) 136 - 145 mmol/L    Potassium 3.9 3.5 - 5.3 mmol/L    Chloride 93 (L) 98 - 107 mmol/L    Bicarbonate 27 21 - 32 mmol/L    Anion Gap 11 10 - 20 mmol/L    Urea Nitrogen 15 6 - 23 mg/dL    Creatinine 0.76 0.50 - 1.05 mg/dL    eGFR 82 >60 mL/min/1.73m*2    Calcium 8.5 (L) 8.6 - 10.3 mg/dL   POCT GLUCOSE   Result Value Ref Range    POCT Glucose 117 (H) 74 - 99 mg/dL   Transthoracic Echo (TTE) Complete   Result Value Ref Range    AV pk quintin 1.76 m/s    AV mn grad 7.5 mmHg    LV Biplane EF 44 %    MV avg E/e' ratio 21.43     MV E/A ratio 70.95     LA vol index A/L 39.3 ml/m2    Tricuspid annular plane systolic excursion 1.6 cm    RV free wall pk S' 7.00 cm/s    LVIDd 5.25 cm    RVSP 49.3 mmHg    AV pk grad 12.4 mmHg    LV A4C EF 46.0    POCT GLUCOSE   Result Value Ref Range    POCT Glucose 149 (H) 74 - 99 mg/dL   Heparin Assay, UFH   Result Value Ref Range    Heparin Unfractionated 1.0 See Comment Below for Therapeutic Ranges IU/mL       CT angio chest for pulmonary embolism    Result Date: 4/26/2024  Interpreted By:  Bryon Spear, STUDY: CT ANGIO CHEST FOR PULMONARY EMBOLISM;  4/26/2024 12:15 pm   INDICATION: Signs/Symptoms:SOB.   COMPARISON: None.   ACCESSION NUMBER(S): [id number]   ORDERING CLINICIAN: RYAN BEARD   TECHNIQUE: Helical data acquisition of the chest was obtained with 75 mL Omnipaque 350. Images were reformatted in coronal and sagittal planes. Axial and coronal MIP images were created and reviewed.   FINDINGS: POTENTIAL LIMITATIONS OF THE STUDY: None   HEART AND VESSELS: No discrete  filling defects within the main pulmonary artery or its branches.   Main pulmonary artery and its branches are normal in caliber.   The thoracic aorta is of normal course and caliber with minimal vascular calcifications.   No coronary artery calcifications are seen.The study is not optimized for evaluation of coronary arteries.   The cardiac chambers are not enlarged.   No evidence of pericardial effusion.   MEDIASTINUM AND [place], LOWER NECK AND AXILLA: The visualized thyroid gland is within normal limits.   No evidence of thoracic lymphadenopathy by CT criteria.   Esophagus appears within normal limits as seen.   LUNGS AND AIRWAYS: The trachea and central airways are patent. No endobronchial lesion.   Moderate layering bilateral effusions. Atelectatic changes within the bilateral lower lobes.   UPPER ABDOMEN: Trace ascites.   CHEST WALL AND OSSEOUS STRUCTURES: There are no suspicious osseous lesions. Multilevel degenerative changes are present       1.  No pulmonary emboli. 2. Bilateral layering moderate pleural effusions with adjacent atelectatic changes within the lower lobes. 3. Trace ascites visualized along the hepatic margin.     MACRO: None   Signed by: Bryon Spear 4/26/2024 12:37 PM Dictation workstation:   [id number]    Transthoracic Echo (TTE) Complete    Result Date: 4/26/2024   [facility], [address] [place] [address]               Tel [phone] and Fax [phone] TRANSTHORACIC ECHOCARDIOGRAM REPORT  Patient Name:      NURY HUDSON    Reading Physician:    [id number] Lyric Grimaldo MD Study Date:        4/26/2024            Ordering Provider:    [id number] NEERAJ WALSH MRN/PID:           [id number]             Fellow: Accession#:        [id number]         Nurse:                Racquel Mckay RN Date of Birth/Age: 1950 / 74 years Sonographer:           Mirna Tristan [facility] Gender:            F                    Additional Staff: Height:            162.56 cm            Admit Date:           4/25/2024 Weight:            115.21 kg            Admission Status:     Inpatient -                                                               Routine BSA / BMI:         2.17 m2 / 43.60      Encounter#:           [id number]                    kg/m2                                         Department Location:  [facility]                                                               Invasive Blood Pressure: 124 /95 mmHg Study Type:    TRANSTHORACIC ECHO (TTE) COMPLETE Diagnosis/ICD: Unspecified atrial fibrillation-I48.91; Heart failure,                unspecified-I50.9 Indication:    Atrial Fibrillation, Congestive Heart Failure CPT Code:      Echo Complete w Full Doppler-29125 Patient History: Diabetes:          Yes Pertinent History: A-Fib, Dyspnea and Hyperlipidemia. Study Detail: The following Echo studies were performed: 2D, M-Mode, Doppler and               color flow. Technically challenging study due to body habitus and               prominent lung artifact. Definity used as a contrast agent for               endocardial border definition. Total contrast used for this               procedure was 1.0 mL via IV push. The patient was awake.  PHYSICIAN INTERPRETATION: Left Ventricle: The left ventricular systolic function is mildly decreased, with an estimated ejection fraction of 40-45%. The patient is in atrial fibrillation which may influence the estimate of left ventricular function and transvalvular flows. The calculated ejection fraction is mildly decreased at 44 % using the Carrington's Bi-plane MOD calculation. There is global hypokinesis of the left ventricle with minor regional variations. The left ventricular cavity size is normal. The interventricular septum is flattened in diastole ('D' shaped left ventricle), consistent with right ventricular volume overload.  Left ventricular diastolic filling was indeterminate. Left Atrium: The left atrium is normal in size. Right Ventricle: The right ventricle is normal in size. There is mildly reduced right ventricular systolic function. Right Atrium: The right atrium is mildly dilated. Aortic Valve: The aortic valve is probably trileaflet. There is mild to moderate aortic valve cusp calcification. There is no evidence of aortic valve regurgitation. The peak instantaneous gradient of the aortic valve is 12.4 mmHg. The mean gradient of the aortic valve is 7.5 mmHg. Mitral Valve: The mitral valve is normal in structure. There is mild mitral annular calcification. There is trace to mild mitral valve regurgitation. Tricuspid Valve: The tricuspid valve is structurally normal. There is trace to mild tricuspid regurgitation. The Doppler estimated RVSP is mild to moderately elevated at 49.3 mmHg. Pulmonic Valve: The pulmonic valve is not well visualized. There is physiologic pulmonic valve regurgitation. Pericardium: There is no pericardial effusion noted. Aorta: The aortic root is normal. Systemic Veins: The inferior vena cava appears to be of normal size. There is less than 50% IVC collapse with inspiration. In comparison to the previous echocardiogram(s): There are no prior studies on this patient for comparison purposes.  CONCLUSIONS:  1. Left ventricular systolic function is mildly decreased with a 40-45% estimated ejection fraction.  2. Right ventricular volume overload.  3. There is mildly reduced right ventricular systolic function.  4. Mild to moderately elevated right ventricular systolic pressure.  5. The patient is in atrial fibrillation which may influence the estimate of left ventricular function and transvalvular flows.  6. There is global hypokinesis of the left ventricle with minor regional variations. QUANTITATIVE DATA SUMMARY: 2D MEASUREMENTS:                          Normal Ranges: IVSd:          0.81 cm   (0.6-1.1cm)  LVPWd:         0.77 cm   (0.6-1.1cm) LVIDd:         5.25 cm   (3.9-5.9cm) LVIDs:         4.02 cm LV Mass Index: 67.2 g/m2 LV % FS        23.3 % LA VOLUME:                               Normal Ranges: LA Vol A4C:        87.2 ml    (22+/-6mL/m2) LA Vol A2C:        77.1 ml LA Vol BP:         85.1 ml LA Vol Index A4C:  40.3ml/m2 LA Vol Index A2C:  35.6 ml/m2 LA Vol Index BP:   39.3 ml/m2 LA Area A4C:       26.6 cm2 LA Area A2C:       24.1 cm2 LA Major Axis A4C: 6.9 cm LA Major Axis A2C: 6.4 cm LA Volume Index:   39.1 ml/m2 LA Vol A4C:        81.5 ml LA Vol A2C:        75.0 ml RA VOLUME BY A/L METHOD:                       Normal Ranges: RA Area A4C: 24.0 cm2 M-MODE MEASUREMENTS:                  Normal Ranges: Ao Root: 2.80 cm (2.0-3.7cm) LAs:     4.35 cm (2.7-4.0cm) AORTA MEASUREMENTS:                      Normal Ranges: Ao Sinus, d: 2.90 cm (2.1-3.5cm) Asc Ao, d:   3.10 cm (2.1-3.4cm) LV SYSTOLIC FUNCTION BY 2D PLANIMETRY (MOD):                     Normal Ranges: EF-A4C View: 46.0 % (>=55%) EF-A2C View: 44.9 % EF-Biplane:  44.4 % LV DIASTOLIC FUNCTION:                            Normal Ranges: MV Peak E:      1.50 m/s   (0.7-1.2 m/s) MV Peak A:      0.02 m/s   (0.42-0.7 m/s) E/A Ratio:      70.95      (1.0-2.2) MV e'           0.07 m/s   (>8.0) MV lateral e'   0.07 m/s MV medial e'    0.06 m/s E/e' Ratio:     21.43      (<8.0) PulmV Sys John:  36.27 cm/s PulmV Griffin John: 62.69 cm/s PulmV S/D John:  0.58 MITRAL VALVE:                       Normal Ranges: MV Vmax:    1.60 m/s  (<=1.3m/s) MV peak PG: 10.2 mmHg (<5mmHg) MV mean PG: 3.7 mmHg  (<2mmHg) MV VTI:     31.58 cm  (10-13cm) MV DT:      140 msec  (150-240msec) AORTIC VALVE:                                    Normal Ranges: AoV Vmax:                1.76 m/s  (<=1.7m/s) AoV Peak P.4 mmHg (<20mmHg) AoV Mean P.5 mmHg  (1.7-11.5mmHg) LVOT Max John:            1.41 m/s  (<=1.1m/s) AoV VTI:                 32.05 cm  (18-25cm) LVOT VTI:                 25.68 cm AoV Dimensionless Index: 0.80  RIGHT VENTRICLE: RV Basal 3.70 cm RV Mid   2.10 cm RV Major 7.9 cm TAPSE:   16.0 mm RV s'    0.07 m/s TRICUSPID VALVE/RVSP:                             Normal Ranges: Peak TR Velocity: 3.21 m/s RV Syst Pressure: 49.3 mmHg (< 30mmHg) IVC Diam:         2.05 cm PULMONIC VALVE:                      Normal Ranges: PV Max John: 0.8 m/s  (0.6-0.9m/s) PV Max P.3 mmHg Pulmonary Veins: PulmV Griffin John: 62.69 cm/s PulmV S/D John:  0.58 PulmV Sys John:  36.27 cm/s AORTA: Asc Ao Diam 3.10 cm  [id number] Lyric Grimaldo MD Electronically signed on 2024 at 10:38:37 AM  ** Final **     ECG 12 lead    Result Date: 2024  Atrial fibrillation with rapid ventricular response Low voltage QRS Nonspecific T wave abnormality Abnormal ECG When compared with ECG of 2020 12:16, Atrial fibrillation has replaced Sinus rhythm Vent. rate has increased BY  84 BPM Nonspecific T wave abnormality now evident in Inferior leads Nonspecific T wave abnormality now evident in Lateral leads See ED provider note for full interpretation and clinical correlation Confirmed by Ginger Woods ([id number]) on 2024 10:34:05 AM    XR chest 1 view    Result Date: 2024  Interpreted By:  Jw York, STUDY: XR CHEST 1 VIEW; ;  2024 1:32 pm   INDICATION: Signs/Symptoms:afib RVR; SOB; r/o pulm edema.   COMPARISON: 2020 chest CT and 2014 chest radiograph   ACCESSION NUMBER(S): [id number]   ORDERING CLINICIAN: BAILEY SOLORIO   TECHNIQUE: AP upright portable chest 1:21 p.m.   FINDINGS: The cardiac silhouette is enlarged from the prior exams with mild pulmonary vascular congestion and haziness over the right lung base and to lesser degree left lung base consistent with pleural fluid. Underlying pneumonia is are not excluded.       Cardiomegaly with bilateral pleural effusions and pulmonary vascular congestion.     MACRO: None   Signed by: Jw York 2024 1:47 PM Dictation workstation:    [id number]    ECG 12 lead (Clinic Performed)    Result Date: 4/25/2024  Atrial fibrillation 153 bpm              Assessment/Plan      75 yo F admitted with new onset A fib with pulmonary edema. She is not hypoxic but does endorse shortness of breath.        A fib: in RVR,   - cards following  - anticipate DCCV beginning of next week  - cont heparin drip; transition to DOAC prior to discharge  - hold cardizem drip  - TTE done, shows mildly decreased EF and elevated RSVP  - check CT PE     2. HTN: BP high since admission; resume home antiHTN and give PRNs as needed     3. HLD: cont home statin     4. Diabetes: hold home tirzepatide; start SSI and accuchecks     FEN: regular diet  Code: full  Dispo: telemetry floor for a fib with RVR          Silvano Jo, DO

## 2024-04-26 NOTE — CARE PLAN
The patient's goals for the shift include      The clinical goals for the shift include Pt will not fall this shift    Over the shift, the patient did not make progress toward the following goals. Barriers to progression include the patient. Recommendations to address these barriers include education to patient on falls policy.

## 2024-04-26 NOTE — PROGRESS NOTES
Received message from [facility] about speaking with pt dtr for community resources. SW called and discussed senior resources with dtr. Per dtr request, discussed resources were emailed to her at [email or web address].

## 2024-04-26 NOTE — H&P (VIEW-ONLY)
"Inpatient consult to Cardiology  Consult performed by: Muna Will PA-C  Consult ordered by: Silvano Jo DO        History Of Present Illness:    Alycia Denny is a 74 y.o. female presenting with shortness of breath. Reports that two weeks ago she had an ear infection. Was started on antibiotics and Mucinex. States that the antibiotic helped with her ear infection but the antibiotic made her \"out of sorts\". Reports that she was out of breath with racing thoughts. Patient was in to see her primary care provider today, noted to be short of breath and with tachycardia. EKG was performed showing Afib w/ RVR and the patient was recommended to present to the ED.        Last Recorded Vitals:  Vitals:    04/25/24 1532 04/25/24 1946 04/26/24 0518 04/26/24 1035   BP:  (!) 148/106 (!) 124/95    BP Location:   Right arm    Patient Position:  Lying     Pulse:       Resp:       Temp: 35.9 °C (96.6 °F) 36 °C (96.8 °F) 36.2 °C (97.2 °F) 36.3 °C (97.3 °F)   TempSrc: Temporal Tympanic Tympanic    SpO2:       Weight:       Height:           Last Labs:  CBC - 4/26/2024:  5:44 AM  8.3 11.5 260    37.5      CMP - 4/26/2024:  5:44 AM  8.5 7.2 33 --- 0.4   3.3 4.3 41 52      PTT - No results in last year.  _   _ _     Troponin I, High Sensitivity   Date/Time Value Ref Range Status   04/25/2024 03:21 PM 13 0 - 13 ng/L Final   04/25/2024 01:15 PM 13 0 - 13 ng/L Final     BNP   Date/Time Value Ref Range Status   04/25/2024 01:15  (H) 0 - 99 pg/mL Final     Hemoglobin A1C   Date/Time Value Ref Range Status   06/05/2023 01:38 PM 8.1 (A) % Final     Comment:          Diagnosis of Diabetes-Adults   Non-Diabetic: < or = 5.6%   Increased risk for developing diabetes: 5.7-6.4%   Diagnostic of diabetes: > or = 6.5%  .       Monitoring of Diabetes                Age (y)     Therapeutic Goal (%)   Adults:          >18           <7.0   Pediatrics:    13-18           <7.5                   7-12           <8.0                   0- 6    " "        7.5-8.5   American Diabetes Association. Diabetes Care 33(S1), Jan 2010.     01/27/2022 12:16 PM 11.9 (A) % Final     Comment:          Diagnosis of Diabetes-Adults   Non-Diabetic: < or = 5.6%   Increased risk for developing diabetes: 5.7-6.4%   Diagnostic of diabetes: > or = 6.5%  .       Monitoring of Diabetes                Age (y)     Therapeutic Goal (%)   Adults:          >18           <7.0   Pediatrics:    13-18           <7.5                   7-12           <8.0                   0- 6            7.5-8.5   American Diabetes Association. Diabetes Care 33(S1), Jan 2010.       VLDL   Date/Time Value Ref Range Status   12/16/2022 10:12 AM 35 0 - 40 mg/dL Final   10/07/2021 09:26 AM 54 (H) 0 - 40 mg/dL Final   05/07/2021 09:48 AM 43 (H) 0 - 40 mg/dL Final      Last I/O:  I/O last 3 completed shifts:  In: 732.1 (6.3 mL/kg) [P.O.:480; I.V.:252.1 (2.2 mL/kg)]  Out: 390 (3.4 mL/kg) [Urine:390 (0.1 mL/kg/hr)]  Weight: 115.4 kg     Past Cardiology Tests (Last 3 Years):  EKG:  ECG 12 lead 04/26/2024 12:53 (Afib w/ RVR rate 155)    Ejection Fractions:  No results found for: \"EF\"  Cath:  No results found for this or any previous visit from the past 1095 days.    Stress Test:  No results found for this or any previous visit from the past 1095 days.    Imaging:  CXR (04/25/2024):   IMPRESSION:  Cardiomegaly with bilateral pleural effusions and pulmonary vascular congestion.      Past Medical History:  She has a past medical history of Hyperlipidemia, unspecified (11/28/2022), Neuralgia and neuritis, unspecified (05/18/2022), and Type 2 diabetes mellitus without complications (Multi) (11/28/2022).    Past Surgical History:  She has no past surgical history on file.      Social History:  She reports that she has never smoked. She has never used smokeless tobacco. She reports that she does not drink alcohol and does not use drugs.    Family History:  No family history on file.     Allergies:  Aspirin, Clarithromycin, " Metformin, and Rosuvastatin    Inpatient Medications:  Scheduled medications   Medication Dose Route Frequency    cholecalciferol  2,000 Units oral Daily    fluticasone  2 spray Each Nostril Daily    furosemide  40 mg intravenous q12h    heparin  80 Units/kg intravenous Once    ibuprofen  200 mg oral q6h    loratadine  10 mg oral Daily    losartan  25 mg oral BID    [Held by provider] metoprolol tartrate  25 mg oral BID     PRN medications   Medication    acetaminophen    Or    acetaminophen    Or    acetaminophen    docusate sodium    heparin    metoprolol    ondansetron    Or    ondansetron    polyethylene glycol     Continuous Medications   Medication Dose Last Rate    dilTIAZem  5-15 mg/hr 5 mg/hr (04/26/24 1022)    heparin  0-4,500 Units/hr 1,800 Units/hr (04/26/24 5433)     Outpatient Medications:  Current Outpatient Medications   Medication Instructions    cholecalciferol (VITAMIN D-3) 50 mcg, oral, Every morning    flash glucose sensor kit (FreeStyle Gabbi 2 Sensor) kit Change the sensor every 14 days    fluticasone (Flonase Allergy Relief) 50 mcg/actuation nasal spray 2 sprays, Each Nostril, Daily    ibuprofen 200 mg, oral, Every 6 hours    loratadine 10 mg capsule oral    losartan (COZAAR) 25 mg, oral, 2 times daily    Mounjaro 5 mg, subcutaneous, Once Weekly    multivit-min-folic acid-biotin (Women's Multivitamin w-Biotin) 200-300 mcg tablet,chewable 1 tablet, oral, Daily    psyllium (Metamucil) 3.4 gram packet 1 packet, oral, Every morning       Physical Exam:  Physical Exam  Constitutional:       Appearance: Normal appearance.   HENT:      Head: Normocephalic.      Nose: Nose normal.      Mouth/Throat:      Mouth: Mucous membranes are moist.   Eyes:      Conjunctiva/sclera: Conjunctivae normal.   Neck:      Comments: +JVD  Cardiovascular:      Rate and Rhythm: Tachycardia present. Rhythm irregular.      Heart sounds: No murmur heard.  Pulmonary:      Breath sounds: No rhonchi.      Comments:  Conversational dyspnea. On RA.     Abdominal:      Palpations: Abdomen is soft.   Musculoskeletal:      Right lower leg: Edema present.      Left lower leg: Edema present.      Comments: Pitting edema to the b/l lower extremities.      Skin:     General: Skin is warm.   Neurological:      General: No focal deficit present.      Mental Status: She is alert. Mental status is at baseline.   Psychiatric:         Mood and Affect: Mood normal.       [id number] Score  Age 65-74: 1   Sex Female: 1   CHF History No: 0   HTN No: 0   Stroke/TIA/Thromboembolism No: 0   Vascular Dz: CAD/PAD/Aortic Plaque No: 0   DM Yes: 1   Total Score 3          Assessment/Plan     Lexis Denny is a 73 yo female with a PMH of HLD, DM Type II and neuralgia who presented with respiratory distress, Afib w/ RVR.     #Afib w/ RVR (new)  #Hypervolemia w/ c/f acute heart failure (Echo pending)  #HLD    -Increase Lasix to 40mg IV BID  -Strict I&O, Daily standing weight  -Echo recommended  -c/w Cardizem gtt, Heparin gtt ([id number] 3)  -Holding PO Metoprolol  -Will follow       Update:   -Echocardiogram reviewed with mildly decreased LVSF, EF 40-45%, RV vol overload, mildly reduced RVSF, mild-mod elevated RVSP  -D/c Cardizem gtt  -Start PO Metoprolol 25mg q6h, IV Lopressor PRN for HR >130  -Recommend CT PE   -Continue with Above    Peripheral IV 04/25/24 20 G Left;Anterior Forearm (Active)   Site Assessment Clean;Dry;Intact 04/26/24 0741   Dressing Status Clean;Dry 04/26/24 0741   Number of days: 1       Peripheral IV 04/25/24 22 G Distal;Left Forearm (Active)   Site Assessment Clean;Intact;Dry 04/26/24 0741   Dressing Status Clean;Dry 04/26/24 0741   Number of days: 1       Code Status:  Full Code    I spent  minutes in the professional and overall care of this patient.        Muna Will PA-C

## 2024-04-26 NOTE — CONSULTS
"Inpatient consult to Cardiology  Consult performed by: Muna Will PA-C  Consult ordered by: Silvano Jo DO        History Of Present Illness:    Alycia Denny is a 74 y.o. female presenting with shortness of breath. Reports that two weeks ago she had an ear infection. Was started on antibiotics and Mucinex. States that the antibiotic helped with her ear infection but the antibiotic made her \"out of sorts\". Reports that she was out of breath with racing thoughts. Patient was in to see her primary care provider today, noted to be short of breath and with tachycardia. EKG was performed showing Afib w/ RVR and the patient was recommended to present to the ED.        Last Recorded Vitals:  Vitals:    04/25/24 1532 04/25/24 1946 04/26/24 0518 04/26/24 1035   BP:  (!) 148/106 (!) 124/95    BP Location:   Right arm    Patient Position:  Lying     Pulse:       Resp:       Temp: 35.9 °C (96.6 °F) 36 °C (96.8 °F) 36.2 °C (97.2 °F) 36.3 °C (97.3 °F)   TempSrc: Temporal Tympanic Tympanic    SpO2:       Weight:       Height:           Last Labs:  CBC - 4/26/2024:  5:44 AM  8.3 11.5 260    37.5      CMP - 4/26/2024:  5:44 AM  8.5 7.2 33 --- 0.4   3.3 4.3 41 52      PTT - No results in last year.  _   _ _     Troponin I, High Sensitivity   Date/Time Value Ref Range Status   04/25/2024 03:21 PM 13 0 - 13 ng/L Final   04/25/2024 01:15 PM 13 0 - 13 ng/L Final     BNP   Date/Time Value Ref Range Status   04/25/2024 01:15  (H) 0 - 99 pg/mL Final     Hemoglobin A1C   Date/Time Value Ref Range Status   06/05/2023 01:38 PM 8.1 (A) % Final     Comment:          Diagnosis of Diabetes-Adults   Non-Diabetic: < or = 5.6%   Increased risk for developing diabetes: 5.7-6.4%   Diagnostic of diabetes: > or = 6.5%  .       Monitoring of Diabetes                Age (y)     Therapeutic Goal (%)   Adults:          >18           <7.0   Pediatrics:    13-18           <7.5                   7-12           <8.0                   0- 6    " "        7.5-8.5   American Diabetes Association. Diabetes Care 33(S1), Jan 2010.     01/27/2022 12:16 PM 11.9 (A) % Final     Comment:          Diagnosis of Diabetes-Adults   Non-Diabetic: < or = 5.6%   Increased risk for developing diabetes: 5.7-6.4%   Diagnostic of diabetes: > or = 6.5%  .       Monitoring of Diabetes                Age (y)     Therapeutic Goal (%)   Adults:          >18           <7.0   Pediatrics:    13-18           <7.5                   7-12           <8.0                   0- 6            7.5-8.5   American Diabetes Association. Diabetes Care 33(S1), Jan 2010.       VLDL   Date/Time Value Ref Range Status   12/16/2022 10:12 AM 35 0 - 40 mg/dL Final   10/07/2021 09:26 AM 54 (H) 0 - 40 mg/dL Final   05/07/2021 09:48 AM 43 (H) 0 - 40 mg/dL Final      Last I/O:  I/O last 3 completed shifts:  In: 732.1 (6.3 mL/kg) [P.O.:480; I.V.:252.1 (2.2 mL/kg)]  Out: 390 (3.4 mL/kg) [Urine:390 (0.1 mL/kg/hr)]  Weight: 115.4 kg     Past Cardiology Tests (Last 3 Years):  EKG:  ECG 12 lead 04/26/2024 12:53 (Afib w/ RVR rate 155)    Ejection Fractions:  No results found for: \"EF\"  Cath:  No results found for this or any previous visit from the past 1095 days.    Stress Test:  No results found for this or any previous visit from the past 1095 days.    Imaging:  CXR (04/25/2024):   IMPRESSION:  Cardiomegaly with bilateral pleural effusions and pulmonary vascular congestion.      Past Medical History:  She has a past medical history of Hyperlipidemia, unspecified (11/28/2022), Neuralgia and neuritis, unspecified (05/18/2022), and Type 2 diabetes mellitus without complications (Multi) (11/28/2022).    Past Surgical History:  She has no past surgical history on file.      Social History:  She reports that she has never smoked. She has never used smokeless tobacco. She reports that she does not drink alcohol and does not use drugs.    Family History:  No family history on file.     Allergies:  Aspirin, Clarithromycin, " Metformin, and Rosuvastatin    Inpatient Medications:  Scheduled medications   Medication Dose Route Frequency    cholecalciferol  2,000 Units oral Daily    fluticasone  2 spray Each Nostril Daily    furosemide  40 mg intravenous q12h    heparin  80 Units/kg intravenous Once    ibuprofen  200 mg oral q6h    loratadine  10 mg oral Daily    losartan  25 mg oral BID    [Held by provider] metoprolol tartrate  25 mg oral BID     PRN medications   Medication    acetaminophen    Or    acetaminophen    Or    acetaminophen    docusate sodium    heparin    metoprolol    ondansetron    Or    ondansetron    polyethylene glycol     Continuous Medications   Medication Dose Last Rate    dilTIAZem  5-15 mg/hr 5 mg/hr (04/26/24 1022)    heparin  0-4,500 Units/hr 1,800 Units/hr (04/26/24 3899)     Outpatient Medications:  Current Outpatient Medications   Medication Instructions    cholecalciferol (VITAMIN D-3) 50 mcg, oral, Every morning    flash glucose sensor kit (FreeStyle Gabbi 2 Sensor) kit Change the sensor every 14 days    fluticasone (Flonase Allergy Relief) 50 mcg/actuation nasal spray 2 sprays, Each Nostril, Daily    ibuprofen 200 mg, oral, Every 6 hours    loratadine 10 mg capsule oral    losartan (COZAAR) 25 mg, oral, 2 times daily    Mounjaro 5 mg, subcutaneous, Once Weekly    multivit-min-folic acid-biotin (Women's Multivitamin w-Biotin) 200-300 mcg tablet,chewable 1 tablet, oral, Daily    psyllium (Metamucil) 3.4 gram packet 1 packet, oral, Every morning       Physical Exam:  Physical Exam  Constitutional:       Appearance: Normal appearance.   HENT:      Head: Normocephalic.      Nose: Nose normal.      Mouth/Throat:      Mouth: Mucous membranes are moist.   Eyes:      Conjunctiva/sclera: Conjunctivae normal.   Neck:      Comments: +JVD  Cardiovascular:      Rate and Rhythm: Tachycardia present. Rhythm irregular.      Heart sounds: No murmur heard.  Pulmonary:      Breath sounds: No rhonchi.      Comments:  Conversational dyspnea. On RA.     Abdominal:      Palpations: Abdomen is soft.   Musculoskeletal:      Right lower leg: Edema present.      Left lower leg: Edema present.      Comments: Pitting edema to the b/l lower extremities.      Skin:     General: Skin is warm.   Neurological:      General: No focal deficit present.      Mental Status: She is alert. Mental status is at baseline.   Psychiatric:         Mood and Affect: Mood normal.       [id number] Score  Age 65-74: 1   Sex Female: 1   CHF History No: 0   HTN No: 0   Stroke/TIA/Thromboembolism No: 0   Vascular Dz: CAD/PAD/Aortic Plaque No: 0   DM Yes: 1   Total Score 3          Assessment/Plan     Lexis Denny is a 75 yo female with a PMH of HLD, DM Type II and neuralgia who presented with respiratory distress, Afib w/ RVR.     #Afib w/ RVR (new)  #Hypervolemia w/ c/f acute heart failure (Echo pending)  #HLD    -Increase Lasix to 40mg IV BID  -Strict I&O, Daily standing weight  -Echo recommended  -c/w Cardizem gtt, Heparin gtt ([id number] 3)  -Holding PO Metoprolol  -Will follow       Update:   -Echocardiogram reviewed with mildly decreased LVSF, EF 40-45%, RV vol overload, mildly reduced RVSF, mild-mod elevated RVSP  -D/c Cardizem gtt  -Start PO Metoprolol 25mg q6h, IV Lopressor PRN for HR >130  -Recommend CT PE   -Continue with Above    Peripheral IV 04/25/24 20 G Left;Anterior Forearm (Active)   Site Assessment Clean;Dry;Intact 04/26/24 0741   Dressing Status Clean;Dry 04/26/24 0741   Number of days: 1       Peripheral IV 04/25/24 22 G Distal;Left Forearm (Active)   Site Assessment Clean;Intact;Dry 04/26/24 0741   Dressing Status Clean;Dry 04/26/24 0741   Number of days: 1       Code Status:  Full Code    I spent  minutes in the professional and overall care of this patient.        Muna Will PA-C

## 2024-04-27 LAB
ANION GAP SERPL CALC-SCNC: 12 MMOL/L (ref 10–20)
BUN SERPL-MCNC: 13 MG/DL (ref 6–23)
CALCIUM SERPL-MCNC: 8.5 MG/DL (ref 8.6–10.3)
CHLORIDE SERPL-SCNC: 96 MMOL/L (ref 98–107)
CO2 SERPL-SCNC: 27 MMOL/L (ref 21–32)
CREAT SERPL-MCNC: 0.86 MG/DL (ref 0.5–1.05)
EGFRCR SERPLBLD CKD-EPI 2021: 71 ML/MIN/1.73M*2
ERYTHROCYTE [DISTWIDTH] IN BLOOD BY AUTOMATED COUNT: 15.9 % (ref 11.5–14.5)
GLUCOSE BLD MANUAL STRIP-MCNC: 117 MG/DL (ref 74–99)
GLUCOSE BLD MANUAL STRIP-MCNC: 121 MG/DL (ref 74–99)
GLUCOSE BLD MANUAL STRIP-MCNC: 124 MG/DL (ref 74–99)
GLUCOSE BLD MANUAL STRIP-MCNC: 150 MG/DL (ref 74–99)
GLUCOSE SERPL-MCNC: 101 MG/DL (ref 74–99)
HCT VFR BLD AUTO: 36.2 % (ref 36–46)
HGB BLD-MCNC: 11.2 G/DL (ref 12–16)
MCH RBC QN AUTO: 27.3 PG (ref 26–34)
MCHC RBC AUTO-ENTMCNC: 30.9 G/DL (ref 32–36)
MCV RBC AUTO: 88 FL (ref 80–100)
NRBC BLD-RTO: 0 /100 WBCS (ref 0–0)
PLATELET # BLD AUTO: 262 X10*3/UL (ref 150–450)
POTASSIUM SERPL-SCNC: 3 MMOL/L (ref 3.5–5.3)
RBC # BLD AUTO: 4.1 X10*6/UL (ref 4–5.2)
SODIUM SERPL-SCNC: 132 MMOL/L (ref 136–145)
UFH PPP CHRO-ACNC: 0.7 IU/ML
UFH PPP CHRO-ACNC: 0.7 IU/ML
WBC # BLD AUTO: 6.7 X10*3/UL (ref 4.4–11.3)

## 2024-04-27 PROCEDURE — 2500000002 HC RX 250 W HCPCS SELF ADMINISTERED DRUGS (ALT 637 FOR MEDICARE OP, ALT 636 FOR OP/ED): Performed by: INTERNAL MEDICINE

## 2024-04-27 PROCEDURE — 85520 HEPARIN ASSAY: CPT | Performed by: INTERNAL MEDICINE

## 2024-04-27 PROCEDURE — 82947 ASSAY GLUCOSE BLOOD QUANT: CPT

## 2024-04-27 PROCEDURE — 2500000004 HC RX 250 GENERAL PHARMACY W/ HCPCS (ALT 636 FOR OP/ED): Performed by: PHYSICIAN ASSISTANT

## 2024-04-27 PROCEDURE — 2500000001 HC RX 250 WO HCPCS SELF ADMINISTERED DRUGS (ALT 637 FOR MEDICARE OP): Performed by: PHYSICIAN ASSISTANT

## 2024-04-27 PROCEDURE — 85027 COMPLETE CBC AUTOMATED: CPT | Performed by: INTERNAL MEDICINE

## 2024-04-27 PROCEDURE — 2500000001 HC RX 250 WO HCPCS SELF ADMINISTERED DRUGS (ALT 637 FOR MEDICARE OP): Performed by: INTERNAL MEDICINE

## 2024-04-27 PROCEDURE — 2060000001 HC INTERMEDIATE ICU ROOM DAILY

## 2024-04-27 PROCEDURE — 2500000004 HC RX 250 GENERAL PHARMACY W/ HCPCS (ALT 636 FOR OP/ED): Performed by: INTERNAL MEDICINE

## 2024-04-27 PROCEDURE — 80048 BASIC METABOLIC PNL TOTAL CA: CPT | Performed by: INTERNAL MEDICINE

## 2024-04-27 PROCEDURE — 99233 SBSQ HOSP IP/OBS HIGH 50: CPT | Performed by: INTERNAL MEDICINE

## 2024-04-27 PROCEDURE — 99232 SBSQ HOSP IP/OBS MODERATE 35: CPT | Performed by: INTERNAL MEDICINE

## 2024-04-27 PROCEDURE — 36415 COLL VENOUS BLD VENIPUNCTURE: CPT | Performed by: INTERNAL MEDICINE

## 2024-04-27 RX ORDER — METOPROLOL TARTRATE 50 MG/1
50 TABLET ORAL EVERY 6 HOURS
Status: DISCONTINUED | OUTPATIENT
Start: 2024-04-27 | End: 2024-04-29 | Stop reason: HOSPADM

## 2024-04-27 RX ADMIN — IBUPROFEN 200 MG: 200 TABLET, FILM COATED ORAL at 20:13

## 2024-04-27 RX ADMIN — Medication 2000 UNITS: at 08:53

## 2024-04-27 RX ADMIN — FUROSEMIDE 40 MG: 10 INJECTION, SOLUTION INTRAVENOUS at 20:15

## 2024-04-27 RX ADMIN — LOSARTAN POTASSIUM 25 MG: 50 TABLET, FILM COATED ORAL at 08:53

## 2024-04-27 RX ADMIN — METOPROLOL TARTRATE 25 MG: 25 TABLET, FILM COATED ORAL at 08:53

## 2024-04-27 RX ADMIN — IBUPROFEN 200 MG: 200 TABLET, FILM COATED ORAL at 14:50

## 2024-04-27 RX ADMIN — FUROSEMIDE 40 MG: 10 INJECTION, SOLUTION INTRAVENOUS at 08:53

## 2024-04-27 RX ADMIN — METOPROLOL TARTRATE 50 MG: 50 TABLET, FILM COATED ORAL at 20:14

## 2024-04-27 RX ADMIN — LORATADINE 10 MG: 10 TABLET ORAL at 08:53

## 2024-04-27 RX ADMIN — IBUPROFEN 200 MG: 200 TABLET, FILM COATED ORAL at 01:45

## 2024-04-27 RX ADMIN — FLUTICASONE PROPIONATE 2 SPRAY: 50 SPRAY, METERED NASAL at 08:53

## 2024-04-27 RX ADMIN — METOPROLOL TARTRATE 25 MG: 25 TABLET, FILM COATED ORAL at 14:50

## 2024-04-27 RX ADMIN — LOSARTAN POTASSIUM 25 MG: 50 TABLET, FILM COATED ORAL at 20:13

## 2024-04-27 RX ADMIN — METOPROLOL TARTRATE 25 MG: 25 TABLET, FILM COATED ORAL at 01:45

## 2024-04-27 RX ADMIN — IBUPROFEN 200 MG: 200 TABLET, FILM COATED ORAL at 08:53

## 2024-04-27 RX ADMIN — DOCUSATE SODIUM 100 MG: 100 CAPSULE, LIQUID FILLED ORAL at 14:56

## 2024-04-27 RX ADMIN — HEPARIN SODIUM 1400 UNITS/HR: 10000 INJECTION, SOLUTION INTRAVENOUS at 16:29

## 2024-04-27 ASSESSMENT — COGNITIVE AND FUNCTIONAL STATUS - GENERAL
STANDING UP FROM CHAIR USING ARMS: A LITTLE
CLIMB 3 TO 5 STEPS WITH RAILING: A LITTLE
HELP NEEDED FOR BATHING: A LITTLE
HELP NEEDED FOR BATHING: A LITTLE
MOBILITY SCORE: 21
WALKING IN HOSPITAL ROOM: A LITTLE
DAILY ACTIVITIY SCORE: 20
MOVING TO AND FROM BED TO CHAIR: A LITTLE
MOBILITY SCORE: 20
STANDING UP FROM CHAIR USING ARMS: A LITTLE
CLIMB 3 TO 5 STEPS WITH RAILING: A LITTLE
TOILETING: A LITTLE
WALKING IN HOSPITAL ROOM: A LITTLE
DRESSING REGULAR LOWER BODY CLOTHING: A LITTLE
DAILY ACTIVITIY SCORE: 20
DRESSING REGULAR UPPER BODY CLOTHING: A LITTLE
DRESSING REGULAR UPPER BODY CLOTHING: A LITTLE
DRESSING REGULAR LOWER BODY CLOTHING: A LITTLE
TOILETING: A LITTLE

## 2024-04-27 ASSESSMENT — PAIN SCALES - GENERAL
PAINLEVEL_OUTOF10: 2
PAINLEVEL_OUTOF10: 5 - MODERATE PAIN
PAINLEVEL_OUTOF10: 5 - MODERATE PAIN
PAINLEVEL_OUTOF10: 2
PAINLEVEL_OUTOF10: 0 - NO PAIN

## 2024-04-27 ASSESSMENT — PAIN - FUNCTIONAL ASSESSMENT: PAIN_FUNCTIONAL_ASSESSMENT: 0-10

## 2024-04-27 NOTE — CARE PLAN
The patient's goals for the shift include  pts HR will lower     The clinical goals for the shift include Pt will not fall this shift    Pt remained on hep drip throughout shift, safety and comfort maintained throughout shift

## 2024-04-27 NOTE — PROGRESS NOTES
Subjective Data:  Overall feeling better, still with some dyspnea---Heart rates 100-120s in AFib       Objective Data:  Last Recorded Vitals:  Vitals:    04/26/24 2024 04/27/24 0144 04/27/24 0432 04/27/24 1005   BP:  146/86 (!) 148/94 122/85   BP Location:  Right arm Left arm Left arm   Patient Position:   Lying Lying   Pulse: 103   (!) 114   Resp:    23   Temp: 36.7 °C (98.1 °F)  36.4 °C (97.6 °F) 36 °C (96.8 °F)   TempSrc: Tympanic  Tympanic Temporal   SpO2:    90%   Weight:       Height:           Last Labs:  CBC - 4/27/2024:  7:21 AM  6.7 11.2 262    36.2      CMP - 4/27/2024:  7:22 AM  8.5 7.2 33 --- 0.4   3.3 4.3 41 52      PTT - No results in last year.  _   _ _     TROPHS   Date/Time Value Ref Range Status   04/25/2024 03:21 PM 13 0 - 13 ng/L Final   04/25/2024 01:15 PM 13 0 - 13 ng/L Final     BNP   Date/Time Value Ref Range Status   04/25/2024 01:15  0 - 99 pg/mL Final     HGBA1C   Date/Time Value Ref Range Status   06/05/2023 01:38 PM 8.1 % Final     Comment:          Diagnosis of Diabetes-Adults   Non-Diabetic: < or = 5.6%   Increased risk for developing diabetes: 5.7-6.4%   Diagnostic of diabetes: > or = 6.5%  .       Monitoring of Diabetes                Age (y)     Therapeutic Goal (%)   Adults:          >18           <7.0   Pediatrics:    13-18           <7.5                   7-12           <8.0                   0- 6            7.5-8.5   American Diabetes Association. Diabetes Care 33(S1), Jan 2010.     01/27/2022 12:16 PM 11.9 % Final     Comment:          Diagnosis of Diabetes-Adults   Non-Diabetic: < or = 5.6%   Increased risk for developing diabetes: 5.7-6.4%   Diagnostic of diabetes: > or = 6.5%  .       Monitoring of Diabetes                Age (y)     Therapeutic Goal (%)   Adults:          >18           <7.0   Pediatrics:    13-18           <7.5                   7-12           <8.0                   0- 6            7.5-8.5   American Diabetes Association. Diabetes Care 33(S1), [place]  2010.       [facility]   Date/Time Value Ref Range Status   12/16/2022 10:12 AM 35 0 - 40 mg/dL Final   10/07/2021 09:26 AM 54 0 - 40 mg/dL Final   05/07/2021 09:48 AM 43 0 - 40 mg/dL Final      Last I/O:  I/O last 3 completed shifts:  In: 1735.4 (15 mL/kg) [P.O.:1080; I.V.:655.4 (5.7 mL/kg)]  Out: 5460 (47.3 mL/kg) [Urine:5450 (1.3 mL/kg/hr); Blood:10]  Weight: 115.4 kg     Past Cardiology Tests (Last 3 Years):  Echo 4/25/24:   1. Left ventricular systolic function is mildly decreased with a 40-45% estimated ejection fraction.   2. Right ventricular volume overload.   3. There is mildly reduced right ventricular systolic function.   4. Mild to moderately elevated right ventricular systolic pressure.   5. The patient is in atrial fibrillation which may influence the estimate of left ventricular function and transvalvular flows.   6. There is global hypokinesis of the left ventricle with minor regional variations.      Inpatient Medications:  Scheduled medications   Medication Dose Route Frequency    cholecalciferol  2,000 Units oral Daily    fluticasone  2 spray Each Nostril Daily    furosemide  40 mg intravenous q12h    heparin  80 Units/kg intravenous Once    ibuprofen  200 mg oral q6h    loratadine  10 mg oral Daily    losartan  25 mg oral BID    metoprolol tartrate  25 mg oral q6h     PRN medications   Medication    acetaminophen    Or    acetaminophen    Or    acetaminophen    docusate sodium    heparin    metoprolol    ondansetron    Or    ondansetron    polyethylene glycol     Continuous Medications   Medication Dose Last Rate    heparin  0-4,500 Units/hr 1,400 Units/hr (04/27/24 0525)       Physical Exam:  Constitutional:       Appearance: Healthy appearance. Not in distress.   Neck:      Vascular: No JVR. JVD normal.   Pulmonary:      Effort: Pulmonary effort is normal.      Breath sounds: Normal breath sounds. No wheezing. No rhonchi. No rales.   Chest:      Chest wall: Not tender to palpatation.    Cardiovascular:      Normal rate. Irregularly irregular rhythm.      Murmurs: There is no murmur.   Edema:     Thigh: bilateral 2+ edema of the thigh.     Pretibial: bilateral 2+ edema of the pretibial area.     Ankle: bilateral 2+ edema of the ankle.     Feet: bilateral 2+ edema of the feet.  Abdominal:      General: Bowel sounds are normal.      Palpations: Abdomen is soft.      Tenderness: There is no abdominal tenderness.   Musculoskeletal: Normal range of motion. Skin:     General: Skin is warm and dry.   Neurological:      General: No focal deficit present.      Mental Status: Alert and oriented to person, place and time.           Assessment/Plan   Lexis Denny is a 75 yo female with a PMH of HLD, DM Type II and neuralgia who presented with respiratory distress, Afib w/ RVR.      #Afib w/ RVR (new)  #ADHF secondary to AF with RVR  #HLD     -increase metoprolol to 50mg q6h  -continue IV lasix twice daily  -continue IV heparin  -like BETHEL/CVN Monday if doesn't revert to spontaneous rhythm prior      Peripheral IV 04/25/24 22 G Distal;Left Forearm (Active)   Site Assessment Clean;Dry;Intact 04/27/24 0900   Number of days: 2       Peripheral IV 04/27/24 22 G Right Forearm (Active)   Site Assessment Clean;Dry;Intact 04/27/24 0900   Number of days: 0       Code Status:  Full Code          Oscar Rodriguez MD

## 2024-04-27 NOTE — PROGRESS NOTES
Alycia Denny is a 74 y.o. female on day 2 of admission presenting with Atrial fibrillation with RVR (Multi).      Subjective   No acute events overnight. Pt denies any new or acute concerns this morning.         Objective     Last Recorded Vitals  /85 (BP Location: Left arm, Patient Position: Lying)   Pulse (!) 114   Temp 36 °C (96.8 °F) (Temporal)   Resp 23   Wt 115 kg (254 lb 6.6 oz)   SpO2 90%   Intake/Output last 3 Shifts:    Intake/Output Summary (Last 24 hours) at 4/27/2024 1216  Last data filed at 4/27/2024 0954  Gross per 24 hour   Intake 851.1 ml   Output 5410 ml   Net -4558.9 ml       Admission Weight  Weight: 116 kg (255 lb 1.2 oz) (04/25/24 1254)    Daily Weight  04/25/24 : 115 kg (254 lb 6.6 oz)    Image Results  CT angio chest for pulmonary embolism  Narrative: Interpreted By:  Bryon Spear,   STUDY:  CT ANGIO CHEST FOR PULMONARY EMBOLISM;  4/26/2024 12:15 pm      INDICATION:  Signs/Symptoms:SOB.      COMPARISON:  None.      ACCESSION NUMBER(S):  [id number]      ORDERING CLINICIAN:  RYAN BEARD      TECHNIQUE:  Helical data acquisition of the chest was obtained with 75 mL  Omnipaque 350. Images were reformatted in coronal and sagittal  planes. Axial and coronal MIP images were created and reviewed.      FINDINGS:  POTENTIAL LIMITATIONS OF THE STUDY: None      HEART AND VESSELS:  No discrete filling defects within the main pulmonary artery or its  branches.      Main pulmonary artery and its branches are normal in caliber.      The thoracic aorta is of normal course and caliber with minimal  vascular calcifications.      No coronary artery calcifications are seen.The study is not optimized  for evaluation of coronary arteries.      The cardiac chambers are not enlarged.      No evidence of pericardial effusion.      MEDIASTINUM AND [place]RA[place], LOWER NECK AND AXILLA:  The visualized thyroid gland is within normal limits.      No evidence of thoracic lymphadenopathy by CT criteria.       Esophagus appears within normal limits as seen.      LUNGS AND AIRWAYS:  The trachea and central airways are patent. No endobronchial lesion.      Moderate layering bilateral effusions. Atelectatic changes within the  bilateral lower lobes.      UPPER ABDOMEN:  Trace ascites.      CHEST WALL AND OSSEOUS STRUCTURES:  There are no suspicious osseous lesions. Multilevel degenerative  changes are present      Impression: 1.  No pulmonary emboli.  2. Bilateral layering moderate pleural effusions with adjacent  atelectatic changes within the lower lobes.  3. Trace ascites visualized along the hepatic margin.          MACRO:  None      Signed by: Bryon Spear 4/26/2024 12:37 PM  Dictation workstation:   [id number]  Transthoracic Echo (TTE) Complete     [facility], [address] [place] [address]                Tel [phone] and Fax [phone]    TRANSTHORACIC ECHOCARDIOGRAM REPORT       Patient Name:      NURY HUDSON    Reading Physician:    [id number] Lyric Grimaldo MD  Study Date:        4/26/2024            Ordering Provider:    [id number] NEERAJ WALSH  MRN/PID:           [id number]             Fellow:  Accession#:        [id number]         Nurse:                Racquel Mckay RN  Date of Birth/Age: 1950 / 74 years Sonographer:          Mirna Tristan RDCS  Gender:            F                    Additional Staff:  Height:            162.56 cm            Admit Date:           4/25/2024  Weight:            115.21 kg            Admission Status:     Inpatient -                                                                Routine  BSA / BMI:         2.17 m2 / 43.60      Encounter#:           [id number]                     kg/m2                                          Department Location:  Pending sale to [facility]                                                                 Invasive  Blood Pressure: 124 /95 mmHg    Study Type:    TRANSTHORACIC ECHO (TTE) COMPLETE  Diagnosis/ICD: Unspecified atrial fibrillation-I48.91; Heart failure,                 unspecified-I50.9  Indication:    Atrial Fibrillation, Congestive Heart Failure  CPT Code:      Echo Complete w Full Doppler-25419    Patient History:  Diabetes:          Yes  Pertinent History: A-Fib, Dyspnea and Hyperlipidemia.    Study Detail: The following Echo studies were performed: 2D, M-Mode, Doppler and                color flow. Technically challenging study due to body habitus and                prominent lung artifact. Definity used as a contrast agent for                endocardial border definition. Total contrast used for this                procedure was 1.0 mL via IV push. The patient was awake.       PHYSICIAN INTERPRETATION:  Left Ventricle: The left ventricular systolic function is mildly decreased, with an estimated ejection fraction of 40-45%. The patient is in atrial fibrillation which may influence the estimate of left ventricular function and transvalvular flows. The calculated ejection fraction is mildly decreased at 44 % using the Carrington's Bi-plane MOD calculation. There is global hypokinesis of the left ventricle with minor regional variations. The left ventricular cavity size is normal. The interventricular septum is flattened in diastole ('D' shaped left ventricle), consistent with right ventricular volume overload. Left ventricular diastolic filling was indeterminate.  Left Atrium: The left atrium is normal in size.  Right Ventricle: The right ventricle is normal in size. There is mildly reduced right ventricular systolic function.  Right Atrium: The right atrium is mildly dilated.  Aortic Valve: The aortic valve is probably trileaflet. There is mild to moderate aortic valve cusp calcification. There is no evidence of aortic valve regurgitation. The peak instantaneous gradient of the aortic valve is 12.4 mmHg.  The mean gradient of the aortic valve is 7.5 mmHg.  Mitral Valve: The mitral valve is normal in structure. There is mild mitral annular calcification. There is trace to mild mitral valve regurgitation.  Tricuspid Valve: The tricuspid valve is structurally normal. There is trace to mild tricuspid regurgitation. The Doppler estimated RVSP is mild to moderately elevated at 49.3 mmHg.  Pulmonic Valve: The pulmonic valve is not well visualized. There is physiologic pulmonic valve regurgitation.  Pericardium: There is no pericardial effusion noted.  Aorta: The aortic root is normal.  Systemic Veins: The inferior vena cava appears to be of normal size. There is less than 50% IVC collapse with inspiration.  In comparison to the previous echocardiogram(s): There are no prior studies on this patient for comparison purposes.       CONCLUSIONS:   1. Left ventricular systolic function is mildly decreased with a 40-45% estimated ejection fraction.   2. Right ventricular volume overload.   3. There is mildly reduced right ventricular systolic function.   4. Mild to moderately elevated right ventricular systolic pressure.   5. The patient is in atrial fibrillation which may influence the estimate of left ventricular function and transvalvular flows.   6. There is global hypokinesis of the left ventricle with minor regional variations.    QUANTITATIVE DATA SUMMARY:  2D MEASUREMENTS:                           Normal Ranges:  IVSd:          0.81 cm   (0.6-1.1cm)  LVPWd:         0.77 cm   (0.6-1.1cm)  LVIDd:         5.25 cm   (3.9-5.9cm)  LVIDs:         4.02 cm  LV Mass Index: 67.2 g/m2  LV % FS        23.3 %    LA VOLUME:                                Normal Ranges:  LA Vol A4C:        87.2 ml    (22+/-6mL/m2)  LA Vol A2C:        77.1 ml  LA Vol BP:         85.1 ml  LA Vol Index A4C:  40.3ml/m2  LA Vol Index A2C:  35.6 ml/m2  LA Vol Index BP:   39.3 ml/m2  LA Area A4C:       26.6 cm2  LA Area A2C:       24.1 cm2  LA Major Axis A4C:  6.9 cm  LA Major Axis A2C: 6.4 cm  LA Volume Index:   39.1 ml/m2  LA Vol A4C:        81.5 ml  LA Vol A2C:        75.0 ml    RA VOLUME BY A/L METHOD:                        Normal Ranges:  RA Area A4C: 24.0 cm2    M-MODE MEASUREMENTS:                   Normal Ranges:  Ao Root: 2.80 cm (2.0-3.7cm)  LAs:     4.35 cm (2.7-4.0cm)    AORTA MEASUREMENTS:                       Normal Ranges:  Ao Sinus, d: 2.90 cm (2.1-3.5cm)  Asc Ao, d:   3.10 cm (2.1-3.4cm)    LV SYSTOLIC FUNCTION BY 2D PLANIMETRY (MOD):                      Normal Ranges:  EF-A4C View: 46.0 % (>=55%)  EF-A2C View: 44.9 %  EF-Biplane:  44.4 %    LV DIASTOLIC FUNCTION:                             Normal Ranges:  MV Peak E:      1.50 m/s   (0.7-1.2 m/s)  MV Peak A:      0.02 m/s   (0.42-0.7 m/s)  E/A Ratio:      70.95      (1.0-2.2)  MV e'           0.07 m/s   (>8.0)  MV lateral e'   0.07 m/s  MV medial e'    0.06 m/s  E/e' Ratio:     21.43      (<8.0)  PulmV Sys John:  36.27 cm/s  PulmV Griffin John: 62.69 cm/s  PulmV S/D John:  0.58    MITRAL VALVE:                        Normal Ranges:  MV Vmax:    1.60 m/s  (<=1.3m/s)  MV peak PG: 10.2 mmHg (<5mmHg)  MV mean PG: 3.7 mmHg  (<2mmHg)  MV VTI:     31.58 cm  (10-13cm)  MV DT:      140 msec  (150-240msec)    AORTIC VALVE:                                     Normal Ranges:  AoV Vmax:                1.76 m/s  (<=1.7m/s)  AoV Peak P.4 mmHg (<20mmHg)  AoV Mean P.5 mmHg  (1.7-11.5mmHg)  LVOT Max John:            1.41 m/s  (<=1.1m/s)  AoV VTI:                 32.05 cm  (18-25cm)  LVOT VTI:                25.68 cm  AoV Dimensionless Index: 0.80       RIGHT VENTRICLE:  RV Basal 3.70 cm  RV Mid   2.10 cm  RV Major 7.9 cm  TAPSE:   16.0 mm  RV s'    0.07 m/s    TRICUSPID VALVE/RVSP:                              Normal Ranges:  Peak TR Velocity: 3.21 m/s  RV Syst Pressure: 49.3 mmHg (< 30mmHg)  IVC Diam:         2.05 cm    PULMONIC VALVE:                       Normal Ranges:  PV Max John: 0.8 m/s   (0.6-0.9m/s)  PV Max P.3 mmHg    Pulmonary Veins:  PulmV Griffin John: 62.69 cm/s  PulmV S/D John:  0.58  PulmV Sys John:  36.27 cm/s    AORTA:  Asc Ao Diam 3.10 cm       [id number] Lyric Grimaldo MD  Electronically signed on 2024 at 10:38:37 AM       ** Final **  ECG 12 lead  Atrial fibrillation with rapid ventricular response  Low voltage QRS  Nonspecific T wave abnormality  Abnormal ECG  When compared with ECG of 2020 12:16,  Atrial fibrillation has replaced Sinus rhythm  Vent. rate has increased BY  84 BPM  Nonspecific T wave abnormality now evident in Inferior leads  Nonspecific T wave abnormality now evident in Lateral leads  See ED provider note for full interpretation and clinical correlation  Confirmed by iGnger Woods ([id number]) on 2024 10:34:05 AM      Physical Exam  Constitutional:       General: She is not in acute distress.  HENT:      Head: Normocephalic.   Eyes:      Extraocular Movements: Extraocular movements intact.   Cardiovascular:      Pulses: Normal pulses.      Comments: Irregular rate and rhythm  Pulmonary:      Effort: Pulmonary effort is normal. No respiratory distress.   Abdominal:      General: Abdomen is flat. There is no distension.   Musculoskeletal:      Right lower leg: Edema (1+) present.      Left lower leg: Edema (1+) present.   Skin:     Coloration: Skin is not jaundiced or pale.   Neurological:      General: No focal deficit present.      Mental Status: She is alert and oriented to person, place, and time.   Psychiatric:         Mood and Affect: Mood normal.         Thought Content: Thought content normal.         Relevant Results  Scheduled medications  cholecalciferol, 2,000 Units, oral, Daily  fluticasone, 2 spray, Each Nostril, Daily  furosemide, 40 mg, intravenous, q12h  heparin, 80 Units/kg, intravenous, Once  ibuprofen, 200 mg, oral, q6h  loratadine, 10 mg, oral, Daily  losartan, 25 mg, oral, BID  metoprolol tartrate, 25 mg, oral, q6h      Continuous  medications  heparin, 0-4,500 Units/hr, Last Rate: 1,400 Units/hr (04/27/24 0587)      PRN medications  PRN medications: acetaminophen **OR** acetaminophen **OR** acetaminophen, docusate sodium, heparin, metoprolol, ondansetron **OR** ondansetron, polyethylene glycol    Results for orders placed or performed during the hospital encounter of 04/25/24 (from the past 24 hour(s))   Heparin Assay, UFH   Result Value Ref Range    Heparin Unfractionated 1.0 See Comment Below for Therapeutic Ranges IU/mL   POCT GLUCOSE   Result Value Ref Range    POCT Glucose 131 (H) 74 - 99 mg/dL   POCT GLUCOSE   Result Value Ref Range    POCT Glucose 127 (H) 74 - 99 mg/dL   Heparin Assay, UFH   Result Value Ref Range    Heparin Unfractionated 0.9 See Comment Below for Therapeutic Ranges IU/mL   CBC   Result Value Ref Range    WBC 7.9 4.4 - 11.3 x10*3/uL    nRBC 0.0 0.0 - 0.0 /100 WBCs    RBC 4.16 4.00 - 5.20 x10*6/uL    Hemoglobin 11.5 (L) 12.0 - 16.0 g/dL    Hematocrit 37.2 36.0 - 46.0 %    MCV 89 80 - 100 fL    MCH 27.6 26.0 - 34.0 pg    MCHC 30.9 (L) 32.0 - 36.0 g/dL    RDW 15.9 (H) 11.5 - 14.5 %    Platelets 263 150 - 450 x10*3/uL   Heparin Assay, UFH   Result Value Ref Range    Heparin Unfractionated 0.7 See Comment Below for Therapeutic Ranges IU/mL   Heparin Assay, UFH   Result Value Ref Range    Heparin Unfractionated 0.7 See Comment Below for Therapeutic Ranges IU/mL   POCT GLUCOSE   Result Value Ref Range    POCT Glucose 121 (H) 74 - 99 mg/dL   CBC   Result Value Ref Range    WBC 6.7 4.4 - 11.3 x10*3/uL    nRBC 0.0 0.0 - 0.0 /100 WBCs    RBC 4.10 4.00 - 5.20 x10*6/uL    Hemoglobin 11.2 (L) 12.0 - 16.0 g/dL    Hematocrit 36.2 36.0 - 46.0 %    MCV 88 80 - 100 fL    MCH 27.3 26.0 - 34.0 pg    MCHC 30.9 (L) 32.0 - 36.0 g/dL    RDW 15.9 (H) 11.5 - 14.5 %    Platelets 262 150 - 450 x10*3/uL   Basic Metabolic Panel   Result Value Ref Range    Glucose 101 (H) 74 - 99 mg/dL    Sodium 132 (L) 136 - 145 mmol/L    Potassium 3.0 (L) 3.5 -  5.3 mmol/L    Chloride 96 (L) 98 - 107 mmol/L    Bicarbonate 27 21 - 32 mmol/L    Anion Gap 12 10 - 20 mmol/L    Urea Nitrogen 13 6 - 23 mg/dL    Creatinine 0.86 0.50 - 1.05 mg/dL    eGFR 71 >60 mL/min/1.73m*2    Calcium 8.5 (L) 8.6 - 10.3 mg/dL   POCT GLUCOSE   Result Value Ref Range    POCT Glucose 124 (H) 74 - 99 mg/dL       CT angio chest for pulmonary embolism    Result Date: 4/26/2024  Interpreted By:  Bryon Spear, STUDY: CT ANGIO CHEST FOR PULMONARY EMBOLISM;  4/26/2024 12:15 pm   INDICATION: Signs/Symptoms:SOB.   COMPARISON: None.   ACCESSION NUMBER(S): [id number]   ORDERING CLINICIAN: RYAN BEARD   TECHNIQUE: Helical data acquisition of the chest was obtained with 75 mL Omnipaque 350. Images were reformatted in coronal and sagittal planes. Axial and coronal MIP images were created and reviewed.   FINDINGS: POTENTIAL LIMITATIONS OF THE STUDY: None   HEART AND VESSELS: No discrete filling defects within the main pulmonary artery or its branches.   Main pulmonary artery and its branches are normal in caliber.   The thoracic aorta is of normal course and caliber with minimal vascular calcifications.   No coronary artery calcifications are seen.The study is not optimized for evaluation of coronary arteries.   The cardiac chambers are not enlarged.   No evidence of pericardial effusion.   MEDIASTINUM AND [place], LOWER NECK AND AXILLA: The visualized thyroid gland is within normal limits.   No evidence of thoracic lymphadenopathy by CT criteria.   Esophagus appears within normal limits as seen.   LUNGS AND AIRWAYS: The trachea and central airways are patent. No endobronchial lesion.   Moderate layering bilateral effusions. Atelectatic changes within the bilateral lower lobes.   UPPER ABDOMEN: Trace ascites.   CHEST WALL AND OSSEOUS STRUCTURES: There are no suspicious osseous lesions. Multilevel degenerative changes are present       1.  No pulmonary emboli. 2. Bilateral layering moderate pleural effusions  with adjacent atelectatic changes within the lower lobes. 3. Trace ascites visualized along the hepatic margin.     MACRO: None   Signed by: Bryon Spear 4/26/2024 12:37 PM Dictation workstation:   [id number]    Transthoracic Echo (TTE) Complete    Result Date: 4/26/2024   [facility], [address] [place] [address]               Tel [phone] and Fax [phone] TRANSTHORACIC ECHOCARDIOGRAM REPORT  Patient Name:      NURY TRISTAN    Reading Physician:    [id number] Lyric Grimaldo MD Study Date:        4/26/2024            Ordering Provider:    [id number] NEERAJ WALSH MRN/PID:           [id number]             Fellow: Accession#:        [id number]         Nurse:                Racquel Mckay RN Date of Birth/Age: 1950 / 74 years Sonographer:          Mirna Tristan RDCS Gender:            F                    Additional Staff: Height:            162.56 cm            Admit Date:           4/25/2024 Weight:            115.21 kg            Admission Status:     Inpatient -                                                               Routine BSA / BMI:         2.17 m2 / 43.60      Encounter#:           [id number]                    kg/m2                                         Department Location:  [facility]                                                               Invasive Blood Pressure: 124 /95 mmHg Study Type:    TRANSTHORACIC ECHO (TTE) COMPLETE Diagnosis/ICD: Unspecified atrial fibrillation-I48.91; Heart failure,                unspecified-I50.9 Indication:    Atrial Fibrillation, Congestive Heart Failure CPT Code:      Echo Complete w Full Doppler-10387 Patient History: Diabetes:          Yes Pertinent History: A-Fib, Dyspnea and Hyperlipidemia. Study Detail: The following Echo studies were performed: 2D, M-Mode, Doppler and               color flow.  Technically challenging study due to body habitus and               prominent lung artifact. Definity used as a contrast agent for               endocardial border definition. Total contrast used for this               procedure was 1.0 mL via IV push. The patient was awake.  PHYSICIAN INTERPRETATION: Left Ventricle: The left ventricular systolic function is mildly decreased, with an estimated ejection fraction of 40-45%. The patient is in atrial fibrillation which may influence the estimate of left ventricular function and transvalvular flows. The calculated ejection fraction is mildly decreased at 44 % using the Carrington's Bi-plane MOD calculation. There is global hypokinesis of the left ventricle with minor regional variations. The left ventricular cavity size is normal. The interventricular septum is flattened in diastole ('D' shaped left ventricle), consistent with right ventricular volume overload. Left ventricular diastolic filling was indeterminate. Left Atrium: The left atrium is normal in size. Right Ventricle: The right ventricle is normal in size. There is mildly reduced right ventricular systolic function. Right Atrium: The right atrium is mildly dilated. Aortic Valve: The aortic valve is probably trileaflet. There is mild to moderate aortic valve cusp calcification. There is no evidence of aortic valve regurgitation. The peak instantaneous gradient of the aortic valve is 12.4 mmHg. The mean gradient of the aortic valve is 7.5 mmHg. Mitral Valve: The mitral valve is normal in structure. There is mild mitral annular calcification. There is trace to mild mitral valve regurgitation. Tricuspid Valve: The tricuspid valve is structurally normal. There is trace to mild tricuspid regurgitation. The Doppler estimated RVSP is mild to moderately elevated at 49.3 mmHg. Pulmonic Valve: The pulmonic valve is not well visualized. There is physiologic pulmonic valve regurgitation. Pericardium: There is no pericardial  effusion noted. Aorta: The aortic root is normal. Systemic Veins: The inferior vena cava appears to be of normal size. There is less than 50% IVC collapse with inspiration. In comparison to the previous echocardiogram(s): There are no prior studies on this patient for comparison purposes.  CONCLUSIONS:  1. Left ventricular systolic function is mildly decreased with a 40-45% estimated ejection fraction.  2. Right ventricular volume overload.  3. There is mildly reduced right ventricular systolic function.  4. Mild to moderately elevated right ventricular systolic pressure.  5. The patient is in atrial fibrillation which may influence the estimate of left ventricular function and transvalvular flows.  6. There is global hypokinesis of the left ventricle with minor regional variations. QUANTITATIVE DATA SUMMARY: 2D MEASUREMENTS:                          Normal Ranges: IVSd:          0.81 cm   (0.6-1.1cm) LVPWd:         0.77 cm   (0.6-1.1cm) LVIDd:         5.25 cm   (3.9-5.9cm) LVIDs:         4.02 cm LV Mass Index: 67.2 g/m2 LV % FS        23.3 % LA VOLUME:                               Normal Ranges: LA Vol A4C:        87.2 ml    (22+/-6mL/m2) LA Vol A2C:        77.1 ml LA Vol BP:         85.1 ml LA Vol Index A4C:  40.3ml/m2 LA Vol Index A2C:  35.6 ml/m2 LA Vol Index BP:   39.3 ml/m2 LA Area A4C:       26.6 cm2 LA Area A2C:       24.1 cm2 LA Major Axis A4C: 6.9 cm LA Major Axis A2C: 6.4 cm LA Volume Index:   39.1 ml/m2 LA Vol A4C:        81.5 ml LA Vol A2C:        75.0 ml RA VOLUME BY A/L METHOD:                       Normal Ranges: RA Area A4C: 24.0 cm2 M-MODE MEASUREMENTS:                  Normal Ranges: Ao Root: 2.80 cm (2.0-3.7cm) LAs:     4.35 cm (2.7-4.0cm) AORTA MEASUREMENTS:                      Normal Ranges: Ao Sinus, d: 2.90 cm (2.1-3.5cm) Asc Ao, d:   3.10 cm (2.1-3.4cm) LV SYSTOLIC FUNCTION BY 2D PLANIMETRY (MOD):                     Normal Ranges: EF-A4C View: 46.0 % (>=55%) EF-A2C View: 44.9 %  EF-Biplane:  44.4 % LV DIASTOLIC FUNCTION:                            Normal Ranges: MV Peak E:      1.50 m/s   (0.7-1.2 m/s) MV Peak A:      0.02 m/s   (0.42-0.7 m/s) E/A Ratio:      70.95      (1.0-2.2) MV e'           0.07 m/s   (>8.0) MV lateral e'   0.07 m/s MV medial e'    0.06 m/s E/e' Ratio:     21.43      (<8.0) PulmV Sys John:  36.27 cm/s PulmV Griffin John: 62.69 cm/s PulmV S/D John:  0.58 MITRAL VALVE:                       Normal Ranges: MV Vmax:    1.60 m/s  (<=1.3m/s) MV peak PG: 10.2 mmHg (<5mmHg) MV mean PG: 3.7 mmHg  (<2mmHg) MV VTI:     31.58 cm  (10-13cm) MV DT:      140 msec  (150-240msec) AORTIC VALVE:                                    Normal Ranges: AoV Vmax:                1.76 m/s  (<=1.7m/s) AoV Peak P.4 mmHg (<20mmHg) AoV Mean P.5 mmHg  (1.7-11.5mmHg) LVOT Max John:            1.41 m/s  (<=1.1m/s) AoV VTI:                 32.05 cm  (18-25cm) LVOT VTI:                25.68 cm AoV Dimensionless Index: 0.80  RIGHT VENTRICLE: RV Basal 3.70 cm RV Mid   2.10 cm RV Major 7.9 cm TAPSE:   16.0 mm RV s'    0.07 m/s TRICUSPID VALVE/RVSP:                             Normal Ranges: Peak TR Velocity: 3.21 m/s RV Syst Pressure: 49.3 mmHg (< 30mmHg) IVC Diam:         2.05 cm PULMONIC VALVE:                      Normal Ranges: PV Max John: 0.8 m/s  (0.6-0.9m/s) PV Max P.3 mmHg Pulmonary Veins: PulmV Griffin John: 62.69 cm/s PulmV S/D John:  0.58 PulmV Sys John:  36.27 cm/s AORTA: Asc Ao Diam 3.10 cm  [id number] Lyric Grimaldo MD Electronically signed on 2024 at 10:38:37 AM  ** Final **     ECG 12 lead    Result Date: 2024  Atrial fibrillation with rapid ventricular response Low voltage QRS Nonspecific T wave abnormality Abnormal ECG When compared with ECG of 2020 12:16, Atrial fibrillation has replaced Sinus rhythm Vent. rate has increased BY  84 BPM Nonspecific T wave abnormality now evident in Inferior leads Nonspecific T wave abnormality now evident in Lateral leads  See ED provider note for full interpretation and clinical correlation Confirmed by Ginger Woods ([id number]) on 4/26/2024 10:34:05 AM    XR chest 1 view    Result Date: 4/25/2024  Interpreted By:  Jw York, STUDY: XR CHEST 1 VIEW; ;  4/25/2024 1:32 pm   INDICATION: Signs/Symptoms:afib RVR; SOB; r/o pulm edema.   COMPARISON: 06/25/2020 chest CT and 01/13/2014 chest radiograph   ACCESSION NUMBER(S): [id number]   ORDERING CLINICIAN: BAILEY SOLORIO   TECHNIQUE: AP upright portable chest 1:21 p.m.   FINDINGS: The cardiac silhouette is enlarged from the prior exams with mild pulmonary vascular congestion and haziness over the right lung base and to lesser degree left lung base consistent with pleural fluid. Underlying pneumonia is are not excluded.       Cardiomegaly with bilateral pleural effusions and pulmonary vascular congestion.     MACRO: None   Signed by: Jw York 4/25/2024 1:47 PM Dictation workstation:   [id number]              Assessment/Plan      73 yo F admitted with new onset A fib with pulmonary edema. She is not hypoxic but does endorse shortness of breath.        A fib: in RVR,   - cards following  - anticipate DCCV beginning of next week  - cont heparin drip; transition to DOAC prior to discharge  - cont metoprolol  - TTE done, shows mildly decreased EF and elevated RSVP  - CT PE neg for PE  - plan on BETHEL/DCCV on monday     2. HTN: BP high since admission; resume home antiHTN and give PRNs as needed     3. HLD: cont home statin     4. Diabetes: hold home tirzepatide; start SSI and accuchecks     FEN: regular diet  Code: full  Dispo: telemetry floor for a fib with RVR          Silvano Jo DO

## 2024-04-28 LAB
GLUCOSE BLD MANUAL STRIP-MCNC: 110 MG/DL (ref 74–99)
GLUCOSE BLD MANUAL STRIP-MCNC: 130 MG/DL (ref 74–99)
GLUCOSE BLD MANUAL STRIP-MCNC: 139 MG/DL (ref 74–99)
GLUCOSE BLD MANUAL STRIP-MCNC: 144 MG/DL (ref 74–99)
GLUCOSE BLD MANUAL STRIP-MCNC: 148 MG/DL (ref 74–99)
HOLD SPECIMEN: NORMAL
HOLD SPECIMEN: NORMAL
UFH PPP CHRO-ACNC: 0.5 IU/ML

## 2024-04-28 PROCEDURE — 2500000004 HC RX 250 GENERAL PHARMACY W/ HCPCS (ALT 636 FOR OP/ED): Performed by: PHYSICIAN ASSISTANT

## 2024-04-28 PROCEDURE — 2060000001 HC INTERMEDIATE ICU ROOM DAILY

## 2024-04-28 PROCEDURE — 85520 HEPARIN ASSAY: CPT | Performed by: INTERNAL MEDICINE

## 2024-04-28 PROCEDURE — 2500000001 HC RX 250 WO HCPCS SELF ADMINISTERED DRUGS (ALT 637 FOR MEDICARE OP): Performed by: INTERNAL MEDICINE

## 2024-04-28 PROCEDURE — 36415 COLL VENOUS BLD VENIPUNCTURE: CPT | Performed by: INTERNAL MEDICINE

## 2024-04-28 PROCEDURE — 82947 ASSAY GLUCOSE BLOOD QUANT: CPT | Mod: 91

## 2024-04-28 PROCEDURE — 99232 SBSQ HOSP IP/OBS MODERATE 35: CPT | Performed by: INTERNAL MEDICINE

## 2024-04-28 PROCEDURE — 2500000004 HC RX 250 GENERAL PHARMACY W/ HCPCS (ALT 636 FOR OP/ED): Performed by: INTERNAL MEDICINE

## 2024-04-28 RX ORDER — INSULIN LISPRO 100 [IU]/ML
0-5 INJECTION, SOLUTION INTRAVENOUS; SUBCUTANEOUS EVERY 4 HOURS
Status: DISCONTINUED | OUTPATIENT
Start: 2024-04-28 | End: 2024-04-29 | Stop reason: HOSPADM

## 2024-04-28 RX ORDER — IBUPROFEN 200 MG
600 TABLET ORAL EVERY 6 HOURS
Status: DISCONTINUED | OUTPATIENT
Start: 2024-04-28 | End: 2024-04-29 | Stop reason: HOSPADM

## 2024-04-28 RX ORDER — FUROSEMIDE 10 MG/ML
40 INJECTION INTRAMUSCULAR; INTRAVENOUS 2 TIMES DAILY
Status: DISCONTINUED | OUTPATIENT
Start: 2024-04-28 | End: 2024-04-29 | Stop reason: HOSPADM

## 2024-04-28 RX ORDER — DEXTROSE 50 % IN WATER (D50W) INTRAVENOUS SYRINGE
25
Status: DISCONTINUED | OUTPATIENT
Start: 2024-04-28 | End: 2024-04-29 | Stop reason: HOSPADM

## 2024-04-28 RX ADMIN — FUROSEMIDE 40 MG: 10 INJECTION, SOLUTION INTRAVENOUS at 18:38

## 2024-04-28 RX ADMIN — APIXABAN 5 MG: 5 TABLET, FILM COATED ORAL at 20:24

## 2024-04-28 RX ADMIN — METOPROLOL TARTRATE 50 MG: 50 TABLET, FILM COATED ORAL at 20:24

## 2024-04-28 RX ADMIN — IBUPROFEN 200 MG: 200 TABLET, FILM COATED ORAL at 01:49

## 2024-04-28 RX ADMIN — METOPROLOL TARTRATE 50 MG: 50 TABLET, FILM COATED ORAL at 01:50

## 2024-04-28 RX ADMIN — LORATADINE 10 MG: 10 TABLET ORAL at 08:06

## 2024-04-28 RX ADMIN — FLUTICASONE PROPIONATE 2 SPRAY: 50 SPRAY, METERED NASAL at 08:06

## 2024-04-28 RX ADMIN — APIXABAN 5 MG: 5 TABLET, FILM COATED ORAL at 10:36

## 2024-04-28 RX ADMIN — LOSARTAN POTASSIUM 25 MG: 50 TABLET, FILM COATED ORAL at 20:24

## 2024-04-28 RX ADMIN — IBUPROFEN 200 MG: 200 TABLET, FILM COATED ORAL at 08:06

## 2024-04-28 RX ADMIN — IBUPROFEN 600 MG: 200 TABLET, FILM COATED ORAL at 15:33

## 2024-04-28 RX ADMIN — IBUPROFEN 600 MG: 200 TABLET, FILM COATED ORAL at 20:24

## 2024-04-28 RX ADMIN — FUROSEMIDE 40 MG: 10 INJECTION, SOLUTION INTRAVENOUS at 08:15

## 2024-04-28 RX ADMIN — METOPROLOL TARTRATE 50 MG: 50 TABLET, FILM COATED ORAL at 12:33

## 2024-04-28 RX ADMIN — Medication 2000 UNITS: at 08:06

## 2024-04-28 RX ADMIN — METOPROLOL TARTRATE 50 MG: 50 TABLET, FILM COATED ORAL at 06:49

## 2024-04-28 RX ADMIN — LOSARTAN POTASSIUM 25 MG: 50 TABLET, FILM COATED ORAL at 08:06

## 2024-04-28 RX ADMIN — DOCUSATE SODIUM 100 MG: 100 CAPSULE, LIQUID FILLED ORAL at 15:34

## 2024-04-28 ASSESSMENT — COGNITIVE AND FUNCTIONAL STATUS - GENERAL
MOBILITY SCORE: 24
DAILY ACTIVITIY SCORE: 24
DAILY ACTIVITIY SCORE: 24
MOBILITY SCORE: 24

## 2024-04-28 ASSESSMENT — PAIN SCALES - GENERAL
PAINLEVEL_OUTOF10: 1
PAINLEVEL_OUTOF10: 0 - NO PAIN
PAINLEVEL_OUTOF10: 7
PAINLEVEL_OUTOF10: 8
PAINLEVEL_OUTOF10: 1
PAINLEVEL_OUTOF10: 4
PAINLEVEL_OUTOF10: 6

## 2024-04-28 ASSESSMENT — PAIN - FUNCTIONAL ASSESSMENT
PAIN_FUNCTIONAL_ASSESSMENT: 0-10
PAIN_FUNCTIONAL_ASSESSMENT: 0-10

## 2024-04-28 NOTE — PROGRESS NOTES
Alycia Denny is a 74 y.o. female on day 3 of admission presenting with Atrial fibrillation with RVR (Multi).      Subjective   Had some back and shoulder pain overnight and reports that she is upset over a nurse who was short and rude to her when she complained about her pain. Today her pain is better. We listened to her story and offered support and sympathy which she appreciated.        Objective     Last Recorded Vitals  /76 (BP Location: Left arm, Patient Position: Lying)   Pulse (!) 119   Temp 36.8 °C (98.2 °F) (Temporal)   Resp 20   Wt 115 kg (254 lb 6.6 oz)   SpO2 96%   Intake/Output last 3 Shifts:    Intake/Output Summary (Last 24 hours) at 4/28/2024 0931  Last data filed at 4/28/2024 0300  Gross per 24 hour   Intake 554.77 ml   Output 3800 ml   Net -3245.23 ml       Admission Weight  Weight: 116 kg (255 lb 1.2 oz) (04/25/24 1254)    Daily Weight  04/25/24 : 115 kg (254 lb 6.6 oz)    Image Results  CT angio chest for pulmonary embolism  Narrative: Interpreted By:  Bryon Spear,   STUDY:  CT ANGIO CHEST FOR PULMONARY EMBOLISM;  4/26/2024 12:15 pm      INDICATION:  Signs/Symptoms:SOB.      COMPARISON:  None.      ACCESSION NUMBER(S):  [id number]      ORDERING CLINICIAN:  RYAN BEARD      TECHNIQUE:  Helical data acquisition of the chest was obtained with 75 mL  Omnipaque 350. Images were reformatted in coronal and sagittal  planes. Axial and coronal MIP images were created and reviewed.      FINDINGS:  POTENTIAL LIMITATIONS OF THE STUDY: None      HEART AND VESSELS:  No discrete filling defects within the main pulmonary artery or its  branches.      Main pulmonary artery and its branches are normal in caliber.      The thoracic aorta is of normal course and caliber with minimal  vascular calcifications.      No coronary artery calcifications are seen.The study is not optimized  for evaluation of coronary arteries.      The cardiac chambers are not enlarged.      No evidence of pericardial  effusion.      MEDIASTINUM AND [place]RA[place], LOWER NECK AND AXILLA:  The visualized thyroid gland is within normal limits.      No evidence of thoracic lymphadenopathy by CT criteria.      Esophagus appears within normal limits as seen.      LUNGS AND AIRWAYS:  The trachea and central airways are patent. No endobronchial lesion.      Moderate layering bilateral effusions. Atelectatic changes within the  bilateral lower lobes.      UPPER ABDOMEN:  Trace ascites.      CHEST WALL AND OSSEOUS STRUCTURES:  There are no suspicious osseous lesions. Multilevel degenerative  changes are present      Impression: 1.  No pulmonary emboli.  2. Bilateral layering moderate pleural effusions with adjacent  atelectatic changes within the lower lobes.  3. Trace ascites visualized along the hepatic margin.          MACRO:  None      Signed by: Bryon Spear 4/26/2024 12:37 PM  Dictation workstation:   [id number]  Transthoracic Echo (TTE) [facility], [address] [place] [address]                Tel [phone] and Fax [phone]    TRANSTHORACIC ECHOCARDIOGRAM REPORT       Patient Name:      NURY HUDSON    Reading Physician:    [id number] Lyric Grimaldo MD  Study Date:        4/26/2024            Ordering Provider:    [id number] NEERAJ WALSH  MRN/PID:           [id number]             Fellow:  Accession#:        [id number]         Nurse:                Racquel Mckay RN  Date of Birth/Age: 1950 / 74 years Sonographer:          Mirna Tristan RDCS  Gender:            F                    Additional Staff:  Height:            162.56 cm            Admit Date:           4/25/2024  Weight:            115.21 kg            Admission Status:     Inpatient -                                                                Routine  BSA / BMI:         2.17 m2 / 43.60      Encounter#:            [id number]                     kg/m2                                          Department Location:  [facility] Non                                                                Invasive  Blood Pressure: 124 /95 mmHg    Study Type:    TRANSTHORACIC ECHO (TTE) COMPLETE  Diagnosis/ICD: Unspecified atrial fibrillation-I48.91; Heart failure,                 unspecified-I50.9  Indication:    Atrial Fibrillation, Congestive Heart Failure  CPT Code:      Echo Complete w Full Doppler-22497    Patient History:  Diabetes:          Yes  Pertinent History: A-Fib, Dyspnea and Hyperlipidemia.    Study Detail: The following Echo studies were performed: 2D, M-Mode, Doppler and                color flow. Technically challenging study due to body habitus and                prominent lung artifact. Definity used as a contrast agent for                endocardial border definition. Total contrast used for this                procedure was 1.0 mL via IV push. The patient was awake.       PHYSICIAN INTERPRETATION:  Left Ventricle: The left ventricular systolic function is mildly decreased, with an estimated ejection fraction of 40-45%. The patient is in atrial fibrillation which may influence the estimate of left ventricular function and transvalvular flows. The calculated ejection fraction is mildly decreased at 44 % using the Carrington's Bi-plane MOD calculation. There is global hypokinesis of the left ventricle with minor regional variations. The left ventricular cavity size is normal. The interventricular septum is flattened in diastole ('D' shaped left ventricle), consistent with right ventricular volume overload. Left ventricular diastolic filling was indeterminate.  Left Atrium: The left atrium is normal in size.  Right Ventricle: The right ventricle is normal in size. There is mildly reduced right ventricular systolic function.  Right Atrium: The right atrium is mildly dilated.  Aortic Valve: The aortic valve is probably  trileaflet. There is mild to moderate aortic valve cusp calcification. There is no evidence of aortic valve regurgitation. The peak instantaneous gradient of the aortic valve is 12.4 mmHg. The mean gradient of the aortic valve is 7.5 mmHg.  Mitral Valve: The mitral valve is normal in structure. There is mild mitral annular calcification. There is trace to mild mitral valve regurgitation.  Tricuspid Valve: The tricuspid valve is structurally normal. There is trace to mild tricuspid regurgitation. The Doppler estimated RVSP is mild to moderately elevated at 49.3 mmHg.  Pulmonic Valve: The pulmonic valve is not well visualized. There is physiologic pulmonic valve regurgitation.  Pericardium: There is no pericardial effusion noted.  Aorta: The aortic root is normal.  Systemic Veins: The inferior vena cava appears to be of normal size. There is less than 50% IVC collapse with inspiration.  In comparison to the previous echocardiogram(s): There are no prior studies on this patient for comparison purposes.       CONCLUSIONS:   1. Left ventricular systolic function is mildly decreased with a 40-45% estimated ejection fraction.   2. Right ventricular volume overload.   3. There is mildly reduced right ventricular systolic function.   4. Mild to moderately elevated right ventricular systolic pressure.   5. The patient is in atrial fibrillation which may influence the estimate of left ventricular function and transvalvular flows.   6. There is global hypokinesis of the left ventricle with minor regional variations.    QUANTITATIVE DATA SUMMARY:  2D MEASUREMENTS:                           Normal Ranges:  IVSd:          0.81 cm   (0.6-1.1cm)  LVPWd:         0.77 cm   (0.6-1.1cm)  LVIDd:         5.25 cm   (3.9-5.9cm)  LVIDs:         4.02 cm  LV Mass Index: 67.2 g/m2  LV % FS        23.3 %    LA VOLUME:                                Normal Ranges:  LA Vol A4C:        87.2 ml    (22+/-6mL/m2)  LA Vol A2C:        77.1 ml  LA Vol  BP:         85.1 ml  LA Vol Index A4C:  40.3ml/m2  LA Vol Index A2C:  35.6 ml/m2  LA Vol Index BP:   39.3 ml/m2  LA Area A4C:       26.6 cm2  LA Area A2C:       24.1 cm2  LA Major Axis A4C: 6.9 cm  LA Major Axis A2C: 6.4 cm  LA Volume Index:   39.1 ml/m2  LA Vol A4C:        81.5 ml  LA Vol A2C:        75.0 ml    RA VOLUME BY A/L METHOD:                        Normal Ranges:  RA Area A4C: 24.0 cm2    M-MODE MEASUREMENTS:                   Normal Ranges:  Ao Root: 2.80 cm (2.0-3.7cm)  LAs:     4.35 cm (2.7-4.0cm)    AORTA MEASUREMENTS:                       Normal Ranges:  Ao Sinus, d: 2.90 cm (2.1-3.5cm)  Asc Ao, d:   3.10 cm (2.1-3.4cm)    LV SYSTOLIC FUNCTION BY 2D PLANIMETRY (MOD):                      Normal Ranges:  EF-A4C View: 46.0 % (>=55%)  EF-A2C View: 44.9 %  EF-Biplane:  44.4 %    LV DIASTOLIC FUNCTION:                             Normal Ranges:  MV Peak E:      1.50 m/s   (0.7-1.2 m/s)  MV Peak A:      0.02 m/s   (0.42-0.7 m/s)  E/A Ratio:      70.95      (1.0-2.2)  MV e'           0.07 m/s   (>8.0)  MV lateral e'   0.07 m/s  MV medial e'    0.06 m/s  E/e' Ratio:     21.43      (<8.0)  PulmV Sys John:  36.27 cm/s  PulmV Griffin John: 62.69 cm/s  PulmV S/D John:  0.58    MITRAL VALVE:                        Normal Ranges:  MV Vmax:    1.60 m/s  (<=1.3m/s)  MV peak PG: 10.2 mmHg (<5mmHg)  MV mean PG: 3.7 mmHg  (<2mmHg)  MV VTI:     31.58 cm  (10-13cm)  MV DT:      140 msec  (150-240msec)    AORTIC VALVE:                                     Normal Ranges:  AoV Vmax:                1.76 m/s  (<=1.7m/s)  AoV Peak P.4 mmHg (<20mmHg)  AoV Mean P.5 mmHg  (1.7-11.5mmHg)  LVOT Max John:            1.41 m/s  (<=1.1m/s)  AoV VTI:                 32.05 cm  (18-25cm)  LVOT VTI:                25.68 cm  AoV Dimensionless Index: 0.80       RIGHT VENTRICLE:  RV Basal 3.70 cm  RV Mid   2.10 cm  RV Major 7.9 cm  TAPSE:   16.0 mm  RV s'    0.07 m/s    TRICUSPID VALVE/RVSP:                               Normal Ranges:  Peak TR Velocity: 3.21 m/s  RV Syst Pressure: 49.3 mmHg (< 30mmHg)  IVC Diam:         2.05 cm    PULMONIC VALVE:                       Normal Ranges:  PV Max John: 0.8 m/s  (0.6-0.9m/s)  PV Max P.3 mmHg    Pulmonary Veins:  PulmV Griffin John: 62.69 cm/s  PulmV S/D John:  0.58  PulmV Sys John:  36.27 cm/s    AORTA:  Asc Ao Diam 3.10 cm       [id number] Lyric Grimaldo MD  Electronically signed on 2024 at 10:38:37 AM       ** Final **  ECG 12 lead  Atrial fibrillation with rapid ventricular response  Low voltage QRS  Nonspecific T wave abnormality  Abnormal ECG  When compared with ECG of 2020 12:16,  Atrial fibrillation has replaced Sinus rhythm  Vent. rate has increased BY  84 BPM  Nonspecific T wave abnormality now evident in Inferior leads  Nonspecific T wave abnormality now evident in Lateral leads  See ED provider note for full interpretation and clinical correlation  Confirmed by Ginger Woods ([id number]) on 2024 10:34:05 AM      Physical Exam  Constitutional:       General: She is not in acute distress.  HENT:      Head: Normocephalic.   Eyes:      Extraocular Movements: Extraocular movements intact.   Cardiovascular:      Pulses: Normal pulses.      Comments: Irregular rate and rhythm  Pulmonary:      Effort: Pulmonary effort is normal. No respiratory distress.   Abdominal:      General: Abdomen is flat. There is no distension.   Musculoskeletal:      Right lower leg: Edema (1+) present.      Left lower leg: Edema (1+) present.   Skin:     Coloration: Skin is not jaundiced or pale.   Neurological:      General: No focal deficit present.      Mental Status: She is alert and oriented to person, place, and time.   Psychiatric:         Mood and Affect: Mood normal.         Thought Content: Thought content normal.         Relevant Results  Scheduled medications  cholecalciferol, 2,000 Units, oral, Daily  fluticasone, 2 spray, Each Nostril, Daily  furosemide, 40 mg, intravenous,  q12h  heparin, 80 Units/kg, intravenous, Once  ibuprofen, 200 mg, oral, q6h  loratadine, 10 mg, oral, Daily  losartan, 25 mg, oral, BID  metoprolol tartrate, 50 mg, oral, q6h      Continuous medications  heparin, 0-4,500 Units/hr, Last Rate: 1,400 Units/hr (04/27/24 2015)      PRN medications  PRN medications: acetaminophen **OR** acetaminophen **OR** acetaminophen, docusate sodium, heparin, metoprolol, ondansetron **OR** ondansetron, polyethylene glycol    Results for orders placed or performed during the hospital encounter of 04/25/24 (from the past 24 hour(s))   POCT GLUCOSE   Result Value Ref Range    POCT Glucose 124 (H) 74 - 99 mg/dL   POCT GLUCOSE   Result Value Ref Range    POCT Glucose 117 (H) 74 - 99 mg/dL   POCT GLUCOSE   Result Value Ref Range    POCT Glucose 150 (H) 74 - 99 mg/dL   Heparin Assay   Result Value Ref Range    Heparin Unfractionated 0.5 See Comment Below for Therapeutic Ranges IU/mL   Lavender Top   Result Value Ref Range    Extra Tube Hold for add-ons.    PST Top   Result Value Ref Range    Extra Tube Hold for add-ons.    POCT GLUCOSE   Result Value Ref Range    POCT Glucose 110 (H) 74 - 99 mg/dL       CT angio chest for pulmonary embolism    Result Date: 4/26/2024  Interpreted By:  Bryon Spear, STUDY: CT ANGIO CHEST FOR PULMONARY EMBOLISM;  4/26/2024 12:15 pm   INDICATION: Signs/Symptoms:SOB.   COMPARISON: None.   ACCESSION NUMBER(S): [id number]   ORDERING CLINICIAN: RYAN BEARD   TECHNIQUE: Helical data acquisition of the chest was obtained with 75 mL Omnipaque 350. Images were reformatted in coronal and sagittal planes. Axial and coronal MIP images were created and reviewed.   FINDINGS: POTENTIAL LIMITATIONS OF THE STUDY: None   HEART AND VESSELS: No discrete filling defects within the main pulmonary artery or its branches.   Main pulmonary artery and its branches are normal in caliber.   The thoracic aorta is of normal course and caliber with minimal vascular calcifications.    No coronary artery calcifications are seen.The study is not optimized for evaluation of coronary arteries.   The cardiac chambers are not enlarged.   No evidence of pericardial effusion.   MEDIASTINUM AND [place]RA[place], LOWER NECK AND AXILLA: The visualized thyroid gland is within normal limits.   No evidence of thoracic lymphadenopathy by CT criteria.   Esophagus appears within normal limits as seen.   LUNGS AND AIRWAYS: The trachea and central airways are patent. No endobronchial lesion.   Moderate layering bilateral effusions. Atelectatic changes within the bilateral lower lobes.   UPPER ABDOMEN: Trace ascites.   CHEST WALL AND OSSEOUS STRUCTURES: There are no suspicious osseous lesions. Multilevel degenerative changes are present       1.  No pulmonary emboli. 2. Bilateral layering moderate pleural effusions with adjacent atelectatic changes within the lower lobes. 3. Trace ascites visualized along the hepatic margin.     MACRO: None   Signed by: Bryon Spear 4/26/2024 12:37 PM Dictation workstation:   [id number]    Transthoracic Echo (TTE) Complete    Result Date: 4/26/2024   [facility], [address] [place] [address]               Tel [phone] and Fax [phone] TRANSTHORACIC ECHOCARDIOGRAM REPORT  Patient Name:      NURY HUDSON    Reading Physician:    [id number] Lyric Grimaldo MD Study Date:        4/26/2024            Ordering Provider:    [id number] NEERAJ WALSH MRN/PID:           [id number]             Fellow: Accession#:        [id number]         Nurse:                Racquel Mckay RN Date of Birth/Age: 1950 / 74 years Sonographer:          Mirna Tristan RDCS Gender:            F                    Additional Staff: Height:            162.56 cm            Admit Date:           4/25/2024 Weight:            115.21 kg            Admission Status:     Inpatient -                                                                Routine BSA / BMI:         2.17 m2 / 43.60      Encounter#:           [id number]                    kg/m2                                         Department Location:  [facility] Non                                                               Invasive Blood Pressure: 124 /95 mmHg Study Type:    TRANSTHORACIC ECHO (TTE) COMPLETE Diagnosis/ICD: Unspecified atrial fibrillation-I48.91; Heart failure,                unspecified-I50.9 Indication:    Atrial Fibrillation, Congestive Heart Failure CPT Code:      Echo Complete w Full Doppler-[id number] Patient History: Diabetes:          Yes Pertinent History: A-Fib, Dyspnea and Hyperlipidemia. Study Detail: The following Echo studies were performed: 2D, M-Mode, Doppler and               color flow. Technically challenging study due to body habitus and               prominent lung artifact. Definity used as a contrast agent for               endocardial border definition. Total contrast used for this               procedure was 1.0 mL via IV push. The patient was awake.  PHYSICIAN INTERPRETATION: Left Ventricle: The left ventricular systolic function is mildly decreased, with an estimated ejection fraction of 40-45%. The patient is in atrial fibrillation which may influence the estimate of left ventricular function and transvalvular flows. The calculated ejection fraction is mildly decreased at 44 % using the Carrington's Bi-plane MOD calculation. There is global hypokinesis of the left ventricle with minor regional variations. The left ventricular cavity size is normal. The interventricular septum is flattened in diastole ('D' shaped left ventricle), consistent with right ventricular volume overload. Left ventricular diastolic filling was indeterminate. Left Atrium: The left atrium is normal in size. Right Ventricle: The right ventricle is normal in size. There is mildly reduced right ventricular systolic function.  Right Atrium: The right atrium is mildly dilated. Aortic Valve: The aortic valve is probably trileaflet. There is mild to moderate aortic valve cusp calcification. There is no evidence of aortic valve regurgitation. The peak instantaneous gradient of the aortic valve is 12.4 mmHg. The mean gradient of the aortic valve is 7.5 mmHg. Mitral Valve: The mitral valve is normal in structure. There is mild mitral annular calcification. There is trace to mild mitral valve regurgitation. Tricuspid Valve: The tricuspid valve is structurally normal. There is trace to mild tricuspid regurgitation. The Doppler estimated RVSP is mild to moderately elevated at 49.3 mmHg. Pulmonic Valve: The pulmonic valve is not well visualized. There is physiologic pulmonic valve regurgitation. Pericardium: There is no pericardial effusion noted. Aorta: The aortic root is normal. Systemic Veins: The inferior vena cava appears to be of normal size. There is less than 50% IVC collapse with inspiration. In comparison to the previous echocardiogram(s): There are no prior studies on this patient for comparison purposes.  CONCLUSIONS:  1. Left ventricular systolic function is mildly decreased with a 40-45% estimated ejection fraction.  2. Right ventricular volume overload.  3. There is mildly reduced right ventricular systolic function.  4. Mild to moderately elevated right ventricular systolic pressure.  5. The patient is in atrial fibrillation which may influence the estimate of left ventricular function and transvalvular flows.  6. There is global hypokinesis of the left ventricle with minor regional variations. QUANTITATIVE DATA SUMMARY: 2D MEASUREMENTS:                          Normal Ranges: IVSd:          0.81 cm   (0.6-1.1cm) LVPWd:         0.77 cm   (0.6-1.1cm) LVIDd:         5.25 cm   (3.9-5.9cm) LVIDs:         4.02 cm LV Mass Index: 67.2 g/m2 LV % FS        23.3 % LA VOLUME:                               Normal Ranges: LA Vol A4C:        87.2  ml    (22+/-6mL/m2) LA Vol A2C:        77.1 ml LA Vol BP:         85.1 ml LA Vol Index A4C:  40.3ml/m2 LA Vol Index A2C:  35.6 ml/m2 LA Vol Index BP:   39.3 ml/m2 LA Area A4C:       26.6 cm2 LA Area A2C:       24.1 cm2 LA Major Axis A4C: 6.9 cm LA Major Axis A2C: 6.4 cm LA Volume Index:   39.1 ml/m2 LA Vol A4C:        81.5 ml LA Vol A2C:        75.0 ml RA VOLUME BY A/L METHOD:                       Normal Ranges: RA Area A4C: 24.0 cm2 M-MODE MEASUREMENTS:                  Normal Ranges: Ao Root: 2.80 cm (2.0-3.7cm) LAs:     4.35 cm (2.7-4.0cm) AORTA MEASUREMENTS:                      Normal Ranges: Ao Sinus, d: 2.90 cm (2.1-3.5cm) Asc Ao, d:   3.10 cm (2.1-3.4cm) LV SYSTOLIC FUNCTION BY 2D PLANIMETRY (MOD):                     Normal Ranges: EF-A4C View: 46.0 % (>=55%) EF-A2C View: 44.9 % EF-Biplane:  44.4 % LV DIASTOLIC FUNCTION:                            Normal Ranges: MV Peak E:      1.50 m/s   (0.7-1.2 m/s) MV Peak A:      0.02 m/s   (0.42-0.7 m/s) E/A Ratio:      70.95      (1.0-2.2) MV e'           0.07 m/s   (>8.0) MV lateral e'   0.07 m/s MV medial e'    0.06 m/s E/e' Ratio:     21.43      (<8.0) PulmV Sys John:  36.27 cm/s PulmV Griffin John: 62.69 cm/s PulmV S/D John:  0.58 MITRAL VALVE:                       Normal Ranges: MV Vmax:    1.60 m/s  (<=1.3m/s) MV peak PG: 10.2 mmHg (<5mmHg) MV mean PG: 3.7 mmHg  (<2mmHg) MV VTI:     31.58 cm  (10-13cm) MV DT:      140 msec  (150-240msec) AORTIC VALVE:                                    Normal Ranges: AoV Vmax:                1.76 m/s  (<=1.7m/s) AoV Peak P.4 mmHg (<20mmHg) AoV Mean P.5 mmHg  (1.7-11.5mmHg) LVOT Max John:            1.41 m/s  (<=1.1m/s) AoV VTI:                 32.05 cm  (18-25cm) LVOT VTI:                25.68 cm AoV Dimensionless Index: 0.80  RIGHT VENTRICLE: RV Basal 3.70 cm RV Mid   2.10 cm RV Major 7.9 cm TAPSE:   16.0 mm RV s'    0.07 m/s TRICUSPID VALVE/RVSP:                             Normal Ranges: Peak TR  Velocity: 3.21 m/s RV Syst Pressure: 49.3 mmHg (< 30mmHg) IVC Diam:         2.05 cm PULMONIC VALVE:                      Normal Ranges: PV Max John: 0.8 m/s  (0.6-0.9m/s) PV Max P.3 mmHg Pulmonary Veins: PulmV Griffin John: 62.69 cm/s PulmV S/D John:  0.58 PulmV Sys John:  36.27 cm/s AORTA: Asc Ao Diam 3.10 cm  [id number] Lyric Grimaldo MD Electronically signed on 2024 at 10:38:37 AM  ** Final **               Assessment/Plan      73 yo F admitted with new onset A fib with pulmonary edema. She is not hypoxic but does endorse shortness of breath.        A fib: in RVR,   - cards following  - anticipate BETHEL/DCCV tomorrow  - NPO midnight  - cont heparin drip; transition to DOAC prior to discharge  - cont metoprolol  - TTE done, shows mildly decreased EF and elevated RSVP  - CT PE neg for PE     2. HTN: BP high since admission; resume home antiHTN and give PRNs as needed     3. HLD: cont home statin     4. Diabetes: hold home tirzepatide; start SSI and accuchecks     FEN: regular diet  Code: full  Dispo: telemetry floor for a fib with RVR          Silvano Jo, DO

## 2024-04-28 NOTE — CARE PLAN
The patient's goals for the shift include  pt will convert to NSR    The clinical goals for the shift include Patient will have no complaints of SOB throughout shift.    Pt started on eliquis , safety and comfort maintained.

## 2024-04-29 ENCOUNTER — APPOINTMENT (OUTPATIENT)
Dept: CARDIOLOGY | Facility: HOSPITAL | Age: 74
DRG: 309 | End: 2024-04-29
Payer: MEDICARE

## 2024-04-29 ENCOUNTER — ANESTHESIA EVENT (OUTPATIENT)
Dept: CARDIOLOGY | Facility: HOSPITAL | Age: 74
DRG: 309 | End: 2024-04-29
Payer: MEDICARE

## 2024-04-29 ENCOUNTER — PHARMACY VISIT (OUTPATIENT)
Dept: PHARMACY | Facility: CLINIC | Age: 74
End: 2024-04-29
Payer: COMMERCIAL

## 2024-04-29 ENCOUNTER — ANESTHESIA (OUTPATIENT)
Dept: CARDIOLOGY | Facility: HOSPITAL | Age: 74
DRG: 309 | End: 2024-04-29
Payer: MEDICARE

## 2024-04-29 VITALS
DIASTOLIC BLOOD PRESSURE: 80 MMHG | RESPIRATION RATE: 20 BRPM | HEART RATE: 77 BPM | HEIGHT: 64 IN | TEMPERATURE: 96.6 F | WEIGHT: 254.41 LBS | OXYGEN SATURATION: 94 % | BODY MASS INDEX: 43.43 KG/M2 | SYSTOLIC BLOOD PRESSURE: 126 MMHG

## 2024-04-29 PROBLEM — Z79.01 ANTICOAGULANT LONG-TERM USE: Status: ACTIVE | Noted: 2024-04-29

## 2024-04-29 PROBLEM — R06.09 DYSPNEA ON EXERTION: Status: ACTIVE | Noted: 2024-04-29

## 2024-04-29 PROBLEM — G47.33 OSA (OBSTRUCTIVE SLEEP APNEA): Status: ACTIVE | Noted: 2024-04-29

## 2024-04-29 LAB
ANION GAP SERPL CALC-SCNC: 13 MMOL/L (ref 10–20)
BUN SERPL-MCNC: 18 MG/DL (ref 6–23)
CALCIUM SERPL-MCNC: 8.8 MG/DL (ref 8.6–10.3)
CHLORIDE SERPL-SCNC: 98 MMOL/L (ref 98–107)
CO2 SERPL-SCNC: 30 MMOL/L (ref 21–32)
CREAT SERPL-MCNC: 0.96 MG/DL (ref 0.5–1.05)
EGFRCR SERPLBLD CKD-EPI 2021: 62 ML/MIN/1.73M*2
ERYTHROCYTE [DISTWIDTH] IN BLOOD BY AUTOMATED COUNT: 16.1 % (ref 11.5–14.5)
GLUCOSE BLD MANUAL STRIP-MCNC: 117 MG/DL (ref 74–99)
GLUCOSE SERPL-MCNC: 107 MG/DL (ref 74–99)
HCT VFR BLD AUTO: 38.9 % (ref 36–46)
HGB BLD-MCNC: 11.9 G/DL (ref 12–16)
MAGNESIUM SERPL-MCNC: 1.98 MG/DL (ref 1.6–2.4)
MCH RBC QN AUTO: 27.6 PG (ref 26–34)
MCHC RBC AUTO-ENTMCNC: 30.6 G/DL (ref 32–36)
MCV RBC AUTO: 90 FL (ref 80–100)
NRBC BLD-RTO: 0 /100 WBCS (ref 0–0)
PLATELET # BLD AUTO: 256 X10*3/UL (ref 150–450)
POTASSIUM SERPL-SCNC: 3 MMOL/L (ref 3.5–5.3)
RBC # BLD AUTO: 4.31 X10*6/UL (ref 4–5.2)
SODIUM SERPL-SCNC: 138 MMOL/L (ref 136–145)
WBC # BLD AUTO: 7.2 X10*3/UL (ref 4.4–11.3)

## 2024-04-29 PROCEDURE — 3700000002 HC GENERAL ANESTHESIA TIME - EACH INCREMENTAL 1 MINUTE

## 2024-04-29 PROCEDURE — 80048 BASIC METABOLIC PNL TOTAL CA: CPT | Performed by: INTERNAL MEDICINE

## 2024-04-29 PROCEDURE — 85027 COMPLETE CBC AUTOMATED: CPT | Performed by: INTERNAL MEDICINE

## 2024-04-29 PROCEDURE — 7100000010 HC PHASE TWO TIME - EACH INCREMENTAL 1 MINUTE

## 2024-04-29 PROCEDURE — 2500000005 HC RX 250 GENERAL PHARMACY W/O HCPCS: Performed by: INTERNAL MEDICINE

## 2024-04-29 PROCEDURE — A92960 PR CARDIOVERSION, ELECTIVE;EXTERN: Performed by: ANESTHESIOLOGY

## 2024-04-29 PROCEDURE — 82947 ASSAY GLUCOSE BLOOD QUANT: CPT

## 2024-04-29 PROCEDURE — 93320 DOPPLER ECHO COMPLETE: CPT

## 2024-04-29 PROCEDURE — 92960 CARDIOVERSION ELECTRIC EXT: CPT | Performed by: INTERNAL MEDICINE

## 2024-04-29 PROCEDURE — 2500000001 HC RX 250 WO HCPCS SELF ADMINISTERED DRUGS (ALT 637 FOR MEDICARE OP): Performed by: INTERNAL MEDICINE

## 2024-04-29 PROCEDURE — 2500000004 HC RX 250 GENERAL PHARMACY W/ HCPCS (ALT 636 FOR OP/ED): Performed by: NURSE ANESTHETIST, CERTIFIED REGISTERED

## 2024-04-29 PROCEDURE — 99100 ANES PT EXTEME AGE<1 YR&>70: CPT | Performed by: ANESTHESIOLOGY

## 2024-04-29 PROCEDURE — 83735 ASSAY OF MAGNESIUM: CPT | Performed by: PHYSICIAN ASSISTANT

## 2024-04-29 PROCEDURE — 99239 HOSP IP/OBS DSCHRG MGMT >30: CPT | Performed by: INTERNAL MEDICINE

## 2024-04-29 PROCEDURE — 97161 PT EVAL LOW COMPLEX 20 MIN: CPT | Mod: GP

## 2024-04-29 PROCEDURE — 7100000009 HC PHASE TWO TIME - INITIAL BASE CHARGE

## 2024-04-29 PROCEDURE — 3700000001 HC GENERAL ANESTHESIA TIME - INITIAL BASE CHARGE

## 2024-04-29 PROCEDURE — 5A2204Z RESTORATION OF CARDIAC RHYTHM, SINGLE: ICD-10-PCS | Performed by: INTERNAL MEDICINE

## 2024-04-29 PROCEDURE — 93312 ECHO TRANSESOPHAGEAL: CPT | Performed by: INTERNAL MEDICINE

## 2024-04-29 PROCEDURE — 36415 COLL VENOUS BLD VENIPUNCTURE: CPT | Performed by: INTERNAL MEDICINE

## 2024-04-29 PROCEDURE — 2500000006 HC RX 250 W HCPCS SELF ADMINISTERED DRUGS (ALT 637 FOR ALL PAYERS): Performed by: INTERNAL MEDICINE

## 2024-04-29 PROCEDURE — 2500000005 HC RX 250 GENERAL PHARMACY W/O HCPCS: Performed by: NURSE ANESTHETIST, CERTIFIED REGISTERED

## 2024-04-29 PROCEDURE — 2500000004 HC RX 250 GENERAL PHARMACY W/ HCPCS (ALT 636 FOR OP/ED): Performed by: INTERNAL MEDICINE

## 2024-04-29 PROCEDURE — 93005 ELECTROCARDIOGRAM TRACING: CPT

## 2024-04-29 PROCEDURE — 93320 DOPPLER ECHO COMPLETE: CPT | Performed by: INTERNAL MEDICINE

## 2024-04-29 PROCEDURE — 93325 DOPPLER ECHO COLOR FLOW MAPG: CPT | Performed by: INTERNAL MEDICINE

## 2024-04-29 PROCEDURE — 2500000006 HC RX 250 W HCPCS SELF ADMINISTERED DRUGS (ALT 637 FOR ALL PAYERS): Performed by: PHYSICIAN ASSISTANT

## 2024-04-29 PROCEDURE — A92960 PR CARDIOVERSION, ELECTIVE;EXTERN: Performed by: NURSE ANESTHETIST, CERTIFIED REGISTERED

## 2024-04-29 PROCEDURE — RXMED WILLOW AMBULATORY MEDICATION CHARGE

## 2024-04-29 RX ORDER — LIDOCAINE HYDROCHLORIDE 20 MG/ML
INJECTION, SOLUTION EPIDURAL; INFILTRATION; INTRACAUDAL; PERINEURAL AS NEEDED
Status: DISCONTINUED | OUTPATIENT
Start: 2024-04-29 | End: 2024-04-29

## 2024-04-29 RX ORDER — FUROSEMIDE 40 MG/1
40 TABLET ORAL DAILY
Qty: 30 TABLET | Refills: 3 | Status: SHIPPED | OUTPATIENT
Start: 2024-04-29

## 2024-04-29 RX ORDER — METOPROLOL TARTRATE 50 MG/1
50 TABLET ORAL 2 TIMES DAILY
Qty: 60 TABLET | Refills: 3 | Status: SHIPPED | OUTPATIENT
Start: 2024-04-29

## 2024-04-29 RX ORDER — POTASSIUM CHLORIDE 20 MEQ/1
60 TABLET, EXTENDED RELEASE ORAL ONCE
Status: COMPLETED | OUTPATIENT
Start: 2024-04-29 | End: 2024-04-29

## 2024-04-29 RX ORDER — LIDOCAINE HYDROCHLORIDE 20 MG/ML
SOLUTION OROPHARYNGEAL AS NEEDED
Status: DISCONTINUED | OUTPATIENT
Start: 2024-04-29 | End: 2024-04-29 | Stop reason: HOSPADM

## 2024-04-29 RX ORDER — SODIUM CHLORIDE 9 MG/ML
INJECTION, SOLUTION INTRAVENOUS CONTINUOUS PRN
Status: DISCONTINUED | OUTPATIENT
Start: 2024-04-29 | End: 2024-04-29

## 2024-04-29 RX ORDER — PROPOFOL 10 MG/ML
INJECTION, EMULSION INTRAVENOUS AS NEEDED
Status: DISCONTINUED | OUTPATIENT
Start: 2024-04-29 | End: 2024-04-29

## 2024-04-29 RX ORDER — POTASSIUM CHLORIDE 20 MEQ/1
20 TABLET, EXTENDED RELEASE ORAL ONCE
Status: COMPLETED | OUTPATIENT
Start: 2024-04-29 | End: 2024-04-29

## 2024-04-29 RX ORDER — LOSARTAN POTASSIUM 25 MG/1
25 TABLET ORAL DAILY
Qty: 30 TABLET | Refills: 3 | Status: SHIPPED | OUTPATIENT
Start: 2024-04-29

## 2024-04-29 RX ADMIN — FUROSEMIDE 40 MG: 10 INJECTION, SOLUTION INTRAVENOUS at 15:15

## 2024-04-29 RX ADMIN — IBUPROFEN 600 MG: 200 TABLET, FILM COATED ORAL at 15:15

## 2024-04-29 RX ADMIN — LORATADINE 10 MG: 10 TABLET ORAL at 08:58

## 2024-04-29 RX ADMIN — POTASSIUM CHLORIDE 20 MEQ: 1500 TABLET, EXTENDED RELEASE ORAL at 08:59

## 2024-04-29 RX ADMIN — PROPOFOL 20 MG: 10 INJECTION, EMULSION INTRAVENOUS at 13:58

## 2024-04-29 RX ADMIN — SODIUM CHLORIDE: 0.9 INJECTION, SOLUTION INTRAVENOUS at 13:43

## 2024-04-29 RX ADMIN — METOPROLOL TARTRATE 50 MG: 50 TABLET, FILM COATED ORAL at 15:16

## 2024-04-29 RX ADMIN — PROPOFOL 40 MG: 10 INJECTION, EMULSION INTRAVENOUS at 13:54

## 2024-04-29 RX ADMIN — FLUTICASONE PROPIONATE 2 SPRAY: 50 SPRAY, METERED NASAL at 09:00

## 2024-04-29 RX ADMIN — BENZOCAINE, BUTAMBEN, AND TETRACAINE HYDROCHLORIDE 1 SPRAY: .028; .004; .004 AEROSOL, SPRAY TOPICAL at 13:50

## 2024-04-29 RX ADMIN — LIDOCAINE HYDROCHLORIDE 50 MG: 20 INJECTION, SOLUTION EPIDURAL; INFILTRATION; INTRACAUDAL; PERINEURAL at 13:54

## 2024-04-29 RX ADMIN — LOSARTAN POTASSIUM 25 MG: 50 TABLET, FILM COATED ORAL at 08:59

## 2024-04-29 RX ADMIN — PROPOFOL 20 MG: 10 INJECTION, EMULSION INTRAVENOUS at 14:00

## 2024-04-29 RX ADMIN — APIXABAN 5 MG: 5 TABLET, FILM COATED ORAL at 08:59

## 2024-04-29 RX ADMIN — POTASSIUM CHLORIDE 60 MEQ: 1500 TABLET, EXTENDED RELEASE ORAL at 10:03

## 2024-04-29 RX ADMIN — LIDOCAINE HYDROCHLORIDE 15 ML: 20 SOLUTION ORAL at 13:49

## 2024-04-29 RX ADMIN — IBUPROFEN 600 MG: 200 TABLET, FILM COATED ORAL at 08:58

## 2024-04-29 RX ADMIN — Medication 2000 UNITS: at 08:58

## 2024-04-29 RX ADMIN — METOPROLOL TARTRATE 50 MG: 50 TABLET, FILM COATED ORAL at 09:00

## 2024-04-29 RX ADMIN — PROPOFOL 30 MG: 10 INJECTION, EMULSION INTRAVENOUS at 13:56

## 2024-04-29 RX ADMIN — IBUPROFEN 600 MG: 200 TABLET, FILM COATED ORAL at 01:29

## 2024-04-29 RX ADMIN — METOPROLOL TARTRATE 50 MG: 50 TABLET, FILM COATED ORAL at 01:30

## 2024-04-29 RX ADMIN — FUROSEMIDE 40 MG: 10 INJECTION, SOLUTION INTRAVENOUS at 08:58

## 2024-04-29 SDOH — HEALTH STABILITY: MENTAL HEALTH: CURRENT SMOKER: 0

## 2024-04-29 ASSESSMENT — COGNITIVE AND FUNCTIONAL STATUS - GENERAL
MOBILITY SCORE: 24
DAILY ACTIVITIY SCORE: 24
MOBILITY SCORE: 24

## 2024-04-29 ASSESSMENT — COLUMBIA-SUICIDE SEVERITY RATING SCALE - C-SSRS
6. HAVE YOU EVER DONE ANYTHING, STARTED TO DO ANYTHING, OR PREPARED TO DO ANYTHING TO END YOUR LIFE?: NO
2. HAVE YOU ACTUALLY HAD ANY THOUGHTS OF KILLING YOURSELF?: NO
1. IN THE PAST MONTH, HAVE YOU WISHED YOU WERE DEAD OR WISHED YOU COULD GO TO SLEEP AND NOT WAKE UP?: NO

## 2024-04-29 ASSESSMENT — PAIN SCALES - GENERAL
PAINLEVEL_OUTOF10: 0 - NO PAIN
PAIN_LEVEL: 0
PAINLEVEL_OUTOF10: 0 - NO PAIN

## 2024-04-29 ASSESSMENT — ACTIVITIES OF DAILY LIVING (ADL): ADL_ASSISTANCE: INDEPENDENT

## 2024-04-29 ASSESSMENT — PAIN - FUNCTIONAL ASSESSMENT
PAIN_FUNCTIONAL_ASSESSMENT: 0-10

## 2024-04-29 NOTE — PROGRESS NOTES
Physical Therapy    Physical Therapy Evaluation    Patient Name: Alycia Denny  MRN: [id number]  Today's Date: 4/29/2024   Time Calculation  Start Time: 1128  Stop Time: 1144  Time Calculation (min): 16 min    Assessment/Plan   PT Assessment  PT Assessment Results: Decreased endurance, Decreased mobility  Rehab Prognosis: Excellent  Barriers to Discharge: Awaiting cardioversion this PM  Evaluation/Treatment Tolerance: Patient tolerated treatment well  Medical Staff Made Aware: Yes  Strengths: Ability to acquire knowledge, Housing layout, Support of Caregivers  End of Session Communication: Bedside nurse, Care Coordinator  Assessment Comment: Patient tolerates mobility well, states she has not been exercising as much lately but does enjoy walking and has a HEP from previous home therapist. Discussed outpatient therapy at length with a focus on aquatics; discussed potential locations and benefits of aquatics for patients primary concern of OA and desire to avoid total joint surgery. TCC aware of outpatient rec.  End of Session Patient Position: Bed, 3 rail up, Alarm off, not on at start of session (sitting EOB)  IP OR SWING BED PT PLAN  Inpatient or Swing Bed: Inpatient  PT Plan  PT Plan: PT Eval only  PT Eval Only Reason: At baseline function  PT Discharge Recommendations: No further acute PT, Other (comment) (Rec outpatient PT)  PT Recommended Transfer Status: Assist x1  PT - OK to Discharge: Yes (per PT POC)      Subjective   General Visit Information:  General  Reason for Referral: Impaired functional mobility; afib with RVR  Referred By: Silvano Jo  Past Medical History Relevant to Rehab: Hyperlipidemia, Neuralgia and neuritis, Type 2 diabetes mellitus  Family/Caregiver Present: Yes ( and dtr)  Prior to Session Communication: Bedside nurse  Patient Position Received: Bed, 3 rail up, Alarm off, not on at start of session (sitting up at EOB)  General Comment: Patient very pleasant, cooperative and  "agreeable to PT eval  Home Living:  Home Living  Type of Home: House  Lives With: Spouse  Home Adaptive Equipment: None  Home Layout: One level  Home Access: Stairs to enter without rails  Entrance Stairs-Rails: None  Entrance Stairs-Number of Steps: 1 (patient reports ~3\" in height)  Prior Level of Function:  Prior Function Per Pt/Caregiver Report  Level of [place]: Independent with ADLs and functional transfers, Independent with homemaking with ambulation  ADL Assistance: Independent  Homemaking Assistance: Independent  Ambulatory Assistance: Independent  Precautions:  Precautions  Medical Precautions: Fall precautions (tele, planned for cardioversion this PM)  Vital Signs:  Vital Signs  Heart Rate: (!) 114  Heart Rate Source: Monitor    Objective   Pain:  Pain Assessment  Pain Assessment: 0-10  Pain Score: 0 - No pain  Cognition:  Cognition  Overall Cognitive Status: Within Functional Limits  Orientation Level: Oriented X4    General Assessments:    Activity Tolerance  Endurance: Tolerates less than 10 min exercise, no significant change in vital signs    Sensation  Light Touch: No apparent deficits    Strength  Strength Comments: B LE 5/5    Coordination  Movements are Fluid and Coordinated: Yes    Postural Control  Postural Control: Within Functional Limits    Static Sitting Balance  Static Sitting-Balance Support: No upper extremity supported, Feet supported  Static Sitting-Level of Assistance: Independent    Static Standing Balance  Static Standing-Balance Support: No upper extremity supported  Static Standing-Level of Assistance: Independent  Functional Assessments:     Transfers  Transfer: Yes  Transfer 1  Transfer From 1: Sit to, Stand to  Transfer to 1: Sit, Stand  Technique 1: Sit to stand, Stand to sit  Transfer Level of Assistance 1: Independent    Ambulation/Gait Training  Ambulation/Gait Training Performed: Yes  Ambulation/Gait Training 1  Surface 1: Level tile  Device 1: No device  Assistance " 1: Independent  Quality of Gait 1:  (steady, wide EDDA)  Comments/Distance (ft) 1: 30'    Outcome Measures:  [facility] Basic Mobility  Turning from your back to your side while in a flat bed without using bedrails: None  Moving from lying on your back to sitting on the side of a flat bed without using bedrails: None  Moving to and from bed to chair (including a wheelchair): None  Standing up from a chair using your arms (e.g. wheelchair or bedside chair): None  To walk in hospital room: None  Climbing 3-5 steps with railing: None  Basic Mobility - Total Score: 24    Education Documentation  Precautions, taught by Bernarda Dennis PT at 4/29/2024 12:04 PM.  Learner: Significant Other, Family, Patient  Readiness: Acceptance  Method: Explanation  Response: Verbalizes Understanding    Body Mechanics, taught by Bernarda Dennis PT at 4/29/2024 12:04 PM.  Learner: Significant Other, Family, Patient  Readiness: Acceptance  Method: Explanation  Response: Verbalizes Understanding    Mobility Training, taught by Bernarda Dennis PT at 4/29/2024 12:04 PM.  Learner: Significant Other, Family, Patient  Readiness: Acceptance  Method: Explanation  Response: Verbalizes Understanding    Education Comments  No comments found.

## 2024-04-29 NOTE — DISCHARGE SUMMARY
Discharge Diagnosis  Atrial fibrillation with RVR (Multi)    Issues Requiring Follow-Up      Discharge Meds     Your medication list        START taking these medications        Instructions Last Dose Given Next Dose Due   Eliquis 5 mg tablet  Generic drug: apixaban      Take 1 tablet (5 mg) by mouth 2 times a day.       furosemide 40 mg tablet  Commonly known as: Lasix      Take 1 tablet (40 mg) by mouth once daily.       metoprolol tartrate 50 mg tablet  Commonly known as: Lopressor      Take 1 tablet by mouth 2 times a day.              CHANGE how you take these medications        Instructions Last Dose Given Next Dose Due   losartan 25 mg tablet  Commonly known as: Cozaar  What changed: when to take this      Take 1 tablet (25 mg) by mouth once daily.              CONTINUE taking these medications        Instructions Last Dose Given Next Dose Due   cholecalciferol 50 MCG (2000 UT) tablet  Commonly known as: Vitamin D-3           Flonase Allergy Relief 50 mcg/actuation nasal spray  Generic drug: fluticasone           FreeStyle Gabbi 2 Sensor kit  Generic drug: flash glucose sensor kit      Change the sensor every 14 days       ibuprofen 200 mg tablet           loratadine 10 mg capsule           Mounjaro 5 mg/0.5 mL pen injector  Generic drug: tirzepatide      Inject 5 mg under the skin 1 (one) time per week.       psyllium 3.4 gram packet  Commonly known as: Metamucil           Women's Multivitamin w-Biotin 200-300 mcg tablet,chewable  Generic drug: multivit-min-folic acid-biotin                     Where to Get Your Medications        These medications were sent to [facility]  [address], [place] [address]      Hours: 9 AM to 5 PM Mon-Fri Phone: [phone]   Eliquis 5 mg tablet  furosemide 40 mg tablet  losartan 25 mg tablet  metoprolol tartrate 50 mg tablet         Test Results Pending At Discharge  Pending Labs       No current pending labs.            Hospital Course       Pertinent  Physical Exam At Time of Discharge  Physical Exam    Outpatient Follow-Up  Future Appointments   Date Time Provider Department Center   8/8/2024  1:00 PM Ria Cordoba MD [id number] Douglas Jo DO

## 2024-04-29 NOTE — CARE PLAN
The patient's goals for the shift include      The clinical goals for the shift include Pt will convert to NS    Over the shift, the patient did not make progress toward the following goals. Barriers to progression include the patient. Recommendations to address these barriers include education to patient on discharge instructions on afib .

## 2024-04-29 NOTE — ANESTHESIA PREPROCEDURE EVALUATION
"Patient: Lexis Denny \"Alycia\"    Procedure Information       Date/Time: 04/29/24 1200    Scheduled providers: Oscar Rodriguez MD    Procedure: TRANSESOPHAGEAL ECHO (BETHEL) W/ POSSIBLE CARDIOVERSION    Location: [facility]     Vitals:    04/29/24 1333   BP: 114/85   Pulse: (!) 133   Resp: 20   Temp:    SpO2: 95%       History reviewed. No pertinent surgical history.  Past Medical History:   Diagnosis Date   • Hyperlipidemia, unspecified 11/28/2022    Hyperlipidemia   • Neuralgia and neuritis, unspecified 05/18/2022    Neuropathic pain of lower extremity, unspecified laterality   • Type 2 diabetes mellitus without complications (Multi) 11/28/2022    Diabetes mellitus, type 2       Current Facility-Administered Medications:   •  acetaminophen (Tylenol) tablet 650 mg, 650 mg, oral, q4h PRN **OR** acetaminophen (Tylenol) oral liquid 650 mg, 650 mg, oral, q4h PRN **OR** acetaminophen (Tylenol) suppository 650 mg, 650 mg, rectal, q4h PRN, Silvano Jo DO  •  apixaban (Eliquis) tablet 5 mg, 5 mg, oral, BID, Silvano Jo DO, 5 mg at 04/29/24 0859  •  cholecalciferol (Vitamin D-3) tablet 2,000 Units, 2,000 Units, oral, Daily, Silvano Jo DO, 2,000 Units at 04/29/24 0858  •  dextrose 50 % injection 25 g, 25 g, intravenous, q15 min PRN, Gloria Figueroa MD  •  docusate sodium (Colace) capsule 100 mg, 100 mg, oral, BID PRN, Silvano Jo DO, 100 mg at 04/28/24 1534  •  fluticasone (Flonase) nasal spray 2 spray, 2 spray, Each Nostril, Daily, Silvano Jo DO, 2 spray at 04/29/24 0900  •  furosemide (Lasix) injection 40 mg, 40 mg, intravenous, BID, Silvano Jo DO, 40 mg at 04/29/24 0858  •  glucagon (Glucagen) injection 1 mg, 1 mg, intramuscular, q15 min PRN, Gloria Figueroa MD  •  ibuprofen tablet 600 mg, 600 mg, oral, q6h, Silvano Jo DO, 600 mg at 04/29/24 0858  •  insulin lispro (HumaLOG) injection 0-5 Units, 0-5 Units, subcutaneous, q4h, Gloria Figueroa MD  •  " lidocaine (Xylocaine) 2 % mouth solution, , , PRN, Oscar Rodriguez MD, 15 mL at 04/29/24 1349  •  loratadine (Claritin) tablet 10 mg, 10 mg, oral, Daily, Silvano Jo DO, 10 mg at 04/29/24 0858  •  losartan (Cozaar) tablet 25 mg, 25 mg, oral, BID, Silvano Jo DO, 25 mg at 04/29/24 0859  •  metoprolol tartrate (Lopressor) injection 5 mg, 5 mg, intravenous, q6h PRN, Silvano Jo DO, 5 mg at 04/25/24 1804  •  metoprolol tartrate (Lopressor) tablet 50 mg, 50 mg, oral, q6h, Oscar Rodriguez MD, 50 mg at 04/29/24 0900  •  ondansetron (Zofran) tablet 4 mg, 4 mg, oral, q8h PRN **OR** ondansetron (Zofran) injection 4 mg, 4 mg, intravenous, q8h PRN, Silvano Jo DO  •  polyethylene glycol (Glycolax, Miralax) packet 17 g, 17 g, oral, Daily PRN, Silvano Jo DO    Facility-Administered Medications Ordered in Other Encounters:   •  sodium chloride 0.9% infusion, , intravenous, Continuous PRN, Makenzie Spear, APRN-CRNA, Last Rate: 500 mL/hr at 04/29/24 1343, New Bag at 04/29/24 1343  Prior to Admission medications    Medication Sig Start Date End Date Taking? Authorizing Provider   cholecalciferol (Vitamin D-3) 50 MCG (2000 UT) tablet Take 1 tablet (50 mcg) by mouth once daily in the morning.   Yes Historical Provider, MD   fluticasone (Flonase Allergy Relief) 50 mcg/actuation nasal spray Administer 2 sprays into each nostril once daily. 8/3/23  Yes Historical Provider, MD   ibuprofen 200 mg tablet Take 1 tablet (200 mg) by mouth every 6 hours. 2/26/20  Yes Historical Provider, MD   loratadine 10 mg capsule Take by mouth.   Yes Historical Provider, MD   losartan (Cozaar) 25 mg tablet Take 1 tablet (25 mg) by mouth 2 times a day. 12/30/22  Yes Historical Provider, MD   multivit-min-folic acid-biotin (Women's Multivitamin w-Biotin) 200-300 mcg tablet,chewable Chew 1 tablet once daily. 2/26/20  Yes Historical Provider, MD   psyllium (Metamucil) 3.4 gram packet Take 1 packet by mouth once daily in the  "morning.   Yes Historical Provider, MD   flash glucose sensor kit (FreeStyle Gabbi 2 Sensor) kit Change the sensor every 14 days 2/13/24   OUMAR Taylor   tirzepatide (Mounjaro) 5 mg/0.5 mL pen injector Inject 5 mg under the skin 1 (one) time per week. 1/24/24   OUMAR Taylor   ergocalciferol (Vitamin D-2) 50 MCG (2000 UT) capsule capsule Take 1 capsule (50 mcg) by mouth once daily. 2/26/20 4/25/24  Historical Provider, MD   repaglinide (Prandin) 2 mg tablet Take up to 2 tablets, twice daily with meals. 11/30/23 4/25/24  OUMAR Taylor   turmeric root extract 500 mg tablet Take 2 tablets by mouth once daily. 9/16/21 4/25/24  Historical Provider, MD   vitamin E acid succinate (vitamin E succinate) 268 mg (400 unit) tablet Take 1 tablet by mouth once daily. 2/26/20 4/25/24  Historical Provider, MD     Allergies   Allergen Reactions   • Aspirin Other   • Clarithromycin Other   • Metformin GI Upset   • Rosuvastatin Unknown     Social History     Tobacco Use   • Smoking status: Never   • Smokeless tobacco: Never   Substance Use Topics   • Alcohol use: Never         Chemistry    Lab Results   Component Value Date/Time     04/29/2024 0532    K 3.0 (L) 04/29/2024 0532    CL 98 04/29/2024 0532    CO2 30 04/29/2024 0532    BUN 18 04/29/2024 0532    CREATININE 0.96 04/29/2024 0532    Lab Results   Component Value Date/Time    CALCIUM 8.8 04/29/2024 0532    ALKPHOS 52 06/05/2023 1338    AST 33 06/05/2023 1338    ALT 41 06/05/2023 1338    BILITOT 0.4 06/05/2023 1338          Lab Results   Component Value Date/Time    WBC 7.2 04/29/2024 0532    HGB 11.9 (L) 04/29/2024 0532    HCT 38.9 04/29/2024 0532     04/29/2024 0532     No results found for: \"PROTIME\", \"PTT\", \"INR\"  Encounter Date: 04/25/24   ECG 12 lead   Result Value    Ventricular Rate 155    QRS Duration 88    QT Interval 304    QTC Calculation(Bazett) 488    R Axis 2    T Axis 4    QRS Count 25    Q Onset 216    T " Offset 368    QTC Fredericia 416    Narrative    Atrial fibrillation with rapid ventricular response  Low voltage QRS  Nonspecific T wave abnormality  Abnormal ECG  When compared with ECG of 08-JUN-2020 12:16,  Atrial fibrillation has replaced Sinus rhythm  Vent. rate has increased BY  84 BPM  Nonspecific T wave abnormality now evident in Inferior leads  Nonspecific T wave abnormality now evident in Lateral leads  See ED provider note for full interpretation and clinical correlation  Confirmed by Ginger Woods ([id number]) on 4/26/2024 10:34:05 AM     No results found for this or any previous visit from the past 1095 days.        Relevant Problems   Anesthesia  Never had anesthesia      Cardiac   (+) Atrial fibrillation with RVR (Multi)   (+) Atrial fibrillation with rapid ventricular response (Multi)   (+) Essential hypertension   (+) Hyperlipidemia      Pulmonary   (+) Asthma (HHS-HCC)   (+) Dyspnea on exertion   (+) GUMARO (obstructive sleep apnea) (No mask)      Neuro   (+) Peripheral neuropathy      GI (within normal limits)      /Renal (within normal limits)      Liver (within normal limits)      Endocrine   (+) Diabetes mellitus, type 2 (Multi)   (+) Painful diabetic neuropathy (Multi)   (+) Type 2 diabetes mellitus with hyperglycemia (Multi)      Hematology   (+) Anticoagulant long-term use (Eliquis)      Musculoskeletal   (+) OA (osteoarthritis) of hip   (+) OA (osteoarthritis) of knee      HEENT   (+) Sinus symptom   (+) Sinusitis       Clinical information reviewed:   Tobacco  Allergies  Meds   Med Hx  Surg Hx   Fam Hx  Soc Hx        NPO Detail:  NPO/Void Status  Date of Last Liquid: 04/29/24  Time of Last Liquid: 0000  Date of Last Solid: 04/29/24  Time of Last Solid: 0000         Physical Exam    Airway  Mallampati: III  TM distance: >3 FB  Neck ROM: full     Cardiovascular   Rhythm: irregular     Dental - normal exam     Pulmonary - normal exam  (+) decreased breath sounds     Abdominal - normal  exam           Anesthesia Plan    History of general anesthesia?: no  History of complications of general anesthesia?: unknown/emergency    ASA 3     MAC     The patient is not a current smoker.    intravenous induction   Postoperative administration of opioids is intended.  Anesthetic plan and risks discussed with patient.    Plan discussed with attending.

## 2024-04-29 NOTE — POST-PROCEDURE NOTE
Physician Transition of Care Summary  Invasive Cardiovascular Lab    Procedure Date: 4/29/2024  Attending: Michael  Resident/Fellow/Other Assistant: * Surgery not found *    Indications:   Atrial fibrillation    Post-procedure diagnosis:   Atrial fibrillation    Procedure(s):   Cardioversion      Description of the Procedure:   Anesthesia bedside to provide conscious sedation.  BETHEL showed no left atrial appendage thrombus.  Synchronized DCCV performed at 200 J biphasic unsuccessful at restoration of sinus rhythm.  Repeat at 200 J biphasic with manual counterpressure successful at restoration of sinus rhythm.    Complications:   none    Stents/Implants:   * No surgery found *    Anticoagulation/Antiplatelet Plan:   apixaban    Estimated Blood Loss:   * No surgery found *    Anesthesia: * No surgery found * Anesthesia Staff: Anesthesia staff cannot be found from this context.    Any Specimen(s) Removed:   * Cannot find OR log *    Disposition:   floor      Electronically signed by: Oscar Rodriguez MD, 4/29/2024 3:55 PM

## 2024-04-29 NOTE — PROGRESS NOTES
04/29/24 1326   Discharge Planning   Living Arrangements Spouse/significant other   Support Systems Spouse/significant other   Assistance Needed Home with spouse, A&0x3, independent with ADL's, no DME ( does have walker, cane, wheelchair available but does not use), drives occasionally, room air, no cpap or bipap, not active with any HHC   Type of Residence Private residence   Number of Stairs to Enter Residence 1   Number of Stairs Within Residence 1   Do you have animals or pets at home? No   Who is requesting discharge planning? Provider   Home or Post Acute Services In home services   Type of Home Care Services Home nursing visits;Home OT;Home PT   Patient expects to be discharged to: Home with spouse and new [facility] for SN, PT, OT SW and new  [facility]. Request made to provider to send referral for both. Daughter concerned about living environment ( hoarding), mother resistant to leave the house for any appointments. Inquiring into any geropsych treatment that offers virtual appointments. SW provided community resources to daughter. Plan for BETHEL/CVN today. May need anticoagulant priced prior to d/c.   Does the patient need discharge transport arranged? No   Patient Choice   Provider Choice list and CMS website (https://medicare.gov/care-compare#search) for post-acute Quality and Resource Measure Data were provided and reviewed with: Family;Patient

## 2024-04-29 NOTE — DISCHARGE SUMMARY
Discharge Diagnosis  Atrial fibrillation with RVR (Multi)    Issues Requiring Follow-Up  Follow up with cardiology    Discharge Meds     Your medication list        START taking these medications        Instructions Last Dose Given Next Dose Due   Eliquis 5 mg tablet  Generic drug: apixaban      Take 1 tablet (5 mg) by mouth 2 times a day.       furosemide 40 mg tablet  Commonly known as: Lasix      Take 1 tablet (40 mg) by mouth once daily.       metoprolol tartrate 50 mg tablet  Commonly known as: Lopressor      Take 1 tablet by mouth 2 times a day.              CHANGE how you take these medications        Instructions Last Dose Given Next Dose Due   losartan 25 mg tablet  Commonly known as: Cozaar  What changed: when to take this      Take 1 tablet (25 mg) by mouth once daily.              CONTINUE taking these medications        Instructions Last Dose Given Next Dose Due   cholecalciferol 50 MCG (2000 UT) tablet  Commonly known as: Vitamin D-3           Flonase Allergy Relief 50 mcg/actuation nasal spray  Generic drug: fluticasone           FreeStyle Gabbi 2 Sensor kit  Generic drug: flash glucose sensor kit      Change the sensor every 14 days       ibuprofen 200 mg tablet           loratadine 10 mg capsule           Mounjaro 5 mg/0.5 mL pen injector  Generic drug: tirzepatide      Inject 5 mg under the skin 1 (one) time per week.       psyllium 3.4 gram packet  Commonly known as: Metamucil           Women's Multivitamin w-Biotin 200-300 mcg tablet,chewable  Generic drug: multivit-min-folic acid-biotin                     Where to Get Your Medications        These medications were sent to [facility]  [address], [facility] [address]      Hours: 9 AM to 5 PM Mon-Fri Phone: [phone]   Eliquis 5 mg tablet  furosemide 40 mg tablet  losartan 25 mg tablet  metoprolol tartrate 50 mg tablet         Test Results Pending At Discharge  Pending Labs       No current pending labs.            Hospital  Course   73 yo F admitted with new onset A fib with RVR. She was started on metoprolol and heparin drip. She did not convert to sinus rhythm on her own over x3 days and remained rate controlled. She denied symptoms once her rate was controlled. On 4/29 she underwent BETHEL with DCCV which was successful in converting her to normal sinus rhythm. She was started on eliquis, metoprolol, losartan. She was discharged to home in stable condition to follow up with pcp and cardiologist.     Pertinent Physical Exam At Time of Discharge  Physical Exam  Vitals and nursing note reviewed.   Constitutional:       General: She is not in acute distress.     Appearance: She is not ill-appearing.   HENT:      Head: Normocephalic and atraumatic.      Mouth/Throat:      Mouth: Mucous membranes are moist.   Eyes:      Extraocular Movements: Extraocular movements intact.      Pupils: Pupils are equal, round, and reactive to light.   Cardiovascular:      Rate and Rhythm: Normal rate and regular rhythm.      Heart sounds: No murmur heard.  Pulmonary:      Effort: Pulmonary effort is normal.      Breath sounds: No wheezing or rhonchi.   Abdominal:      General: There is no distension.      Palpations: Abdomen is soft.      Tenderness: There is no abdominal tenderness.   Musculoskeletal:      Right lower leg: No edema.      Left lower leg: No edema.   Skin:     Coloration: Skin is not jaundiced.      Findings: No erythema.   Neurological:      General: No focal deficit present.      Mental Status: She is alert and oriented to person, place, and time.      Cranial Nerves: No cranial nerve deficit.      Motor: No weakness.   Psychiatric:         Mood and Affect: Mood normal.         Thought Content: Thought content normal.         Outpatient Follow-Up  Future Appointments   Date Time Provider Department Center   8/8/2024  1:00 PM Ria Cordoba MD [id number] Douglas Jo DO

## 2024-04-29 NOTE — PROGRESS NOTES
Subjective Data:  Doing well today. SOB overall has improved, but still fatigued and tachycardic with exertion.   Denies any chest pain, chest pressure, palpitations, dizziness, cough. Orthopnea resolved, LE edema sig improved.     Overnight Events:    No acute issues reported overnight.      Objective Data:  Last Recorded Vitals:  Vitals:    04/28/24 1053 04/28/24 1409 04/28/24 1900 04/29/24 0551   BP: (!) 134/93 (!) 129/99 (!) 122/95    BP Location: Left arm Left arm Left arm    Patient Position: Lying Sitting Sitting    Pulse:  (!) 113     Resp:  17     Temp: 36.6 °C (97.9 °F) 36.3 °C (97.4 °F) 36.2 °C (97.2 °F) 36 °C (96.8 °F)   TempSrc: Temporal Temporal Temporal Temporal   SpO2: 96% 93% 95% 97%   Weight:       Height:           Last Labs:  CBC - 4/29/2024:  5:32 AM  7.2 11.9 256    38.9      CMP - 4/29/2024:  5:32 AM  8.8 7.2 33 --- 0.4   3.3 4.3 41 52      PTT - No results in last year.  _   _ _     TROPHS   Date/Time Value Ref Range Status   04/25/2024 03:21 PM 13 0 - 13 ng/L Final   04/25/2024 01:15 PM 13 0 - 13 ng/L Final     BNP   Date/Time Value Ref Range Status   04/25/2024 01:15  0 - 99 pg/mL Final     HGBA1C   Date/Time Value Ref Range Status   06/05/2023 01:38 PM 8.1 % Final     Comment:          Diagnosis of Diabetes-Adults   Non-Diabetic: < or = 5.6%   Increased risk for developing diabetes: 5.7-6.4%   Diagnostic of diabetes: > or = 6.5%  .       Monitoring of Diabetes                Age (y)     Therapeutic Goal (%)   Adults:          >18           <7.0   Pediatrics:    13-18           <7.5                   7-12           <8.0                   0- 6            7.5-8.5   American Diabetes Association. Diabetes Care 33(S1), Jan 2010.     01/27/2022 12:16 PM 11.9 % Final     Comment:          Diagnosis of Diabetes-Adults   Non-Diabetic: < or = 5.6%   Increased risk for developing diabetes: 5.7-6.4%   Diagnostic of diabetes: > or = 6.5%  .       Monitoring of Diabetes                Age (y)      Therapeutic Goal (%)   Adults:          >18           <7.0   Pediatrics:    13-18           <7.5                   7-12           <8.0                   0- 6            7.5-8.5   American Diabetes Association. Diabetes Care 33(S1), Jan 2010.       VLDL   Date/Time Value Ref Range Status   12/16/2022 10:12 AM 35 0 - 40 mg/dL Final   10/07/2021 09:26 AM 54 0 - 40 mg/dL Final   05/07/2021 09:48 AM 43 0 - 40 mg/dL Final      Last I/O:  I/O last 3 completed shifts:  In: 42 (0.4 mL/kg) [I.V.:42 (0.4 mL/kg)]  Out: 3800 (32.9 mL/kg) [Urine:3800 (0.9 mL/kg/hr)]  Weight: 115.4 kg     Past Cardiology Tests (Last 3 Years):    EKG:  ECG 12 lead 04/26/2024 12:53 (Afib w/ RVR rate 155)     Echocardiogram:  Transesophageal Echo (04/26/2024):   CONCLUSIONS:   1. Left ventricular systolic function is mildly decreased with a 40-45% estimated ejection fraction.   2. Right ventricular volume overload.   3. There is mildly reduced right ventricular systolic function.   4. Mild to moderately elevated right ventricular systolic pressure.   5. The patient is in atrial fibrillation which may influence the estimate of left ventricular function and transvalvular flows.   6. There is global hypokinesis of the left ventricle with minor regional variations.    Cath:  No results found for this or any previous visit from the past 1095 days.     Stress Test:  No results found for this or any previous visit from the past 1095 days.     Imaging:  CXR (04/25/2024):   IMPRESSION:  Cardiomegaly with bilateral pleural effusions and pulmonary vascular congestion.     CT Angio Chest for PE (04/26/2024):   IMPRESSION:  1.  No pulmonary emboli.  2. Bilateral layering moderate pleural effusions with adjacent  atelectatic changes within the lower lobes.  3. Trace ascites visualized along the hepatic margin.     Inpatient Medications:  Scheduled medications   Medication Dose Route Frequency    apixaban  5 mg oral BID    cholecalciferol  2,000 Units oral Daily     fluticasone  2 spray Each Nostril Daily    furosemide  40 mg intravenous BID    ibuprofen  600 mg oral q6h    insulin lispro  0-5 Units subcutaneous q4h    loratadine  10 mg oral Daily    losartan  25 mg oral BID    metoprolol tartrate  50 mg oral q6h    potassium chloride CR  60 mEq oral Once     PRN medications   Medication    acetaminophen    Or    acetaminophen    Or    acetaminophen    dextrose    docusate sodium    glucagon    metoprolol    ondansetron    Or    ondansetron    polyethylene glycol     Continuous Medications   Medication Dose Last Rate       Physical Exam:  Physical Exam  Constitutional:       Appearance: Normal appearance.   HENT:      Head: Normocephalic.      Nose: Nose normal.      Mouth/Throat:      Mouth: Mucous membranes are moist.   Eyes:      Conjunctiva/sclera: Conjunctivae normal.   Neck:      Comments: No JVD  Cardiovascular:      Rate and Rhythm: Tachycardia present. Rhythm irregular.      Heart sounds: No murmur heard.  Pulmonary:      Effort: Pulmonary effort is normal.      Breath sounds: Normal breath sounds.   Abdominal:      Palpations: Abdomen is soft.   Musculoskeletal:      Right lower leg: No edema.      Left lower leg: No edema.   Skin:     General: Skin is warm and dry.   Neurological:      General: No focal deficit present.      Mental Status: She is alert. Mental status is at baseline.   Psychiatric:         Mood and Affect: Mood normal.         Behavior: Behavior normal.              Assessment/Plan     Lexis Denny is a 73 yo female with a PMH of HLD, DM Type II and neuralgia who presented with respiratory distress, Afib w/ RVR.      #Afib w/ RVR (new) s/p DCCV today  #ADHF secondary to AF with RVR  #HLD     -Patient is in NSR s/p cardioversion  -Homegoing medications to include: Metoprolol tartrate 50mg  BID, Losartan 25mg daily, Eliquis  -C/w daily weights at home  -Euvolemic on exam  -Ok to d/c per CV standpoint.   -Follow up in clinic as scheduled.      Peripheral IV 04/27/24 22 G Right Forearm (Active)   Site Assessment Clean;Dry;Intact 04/29/24 0727   Dressing Status Clean;Dry 04/29/24 0727   Number of days: 2       Code Status:  Full Code    I spent  minutes in the professional and overall care of this patient.        GLADYS Bower-ALICE    Patient seen, discussed and examined with GLADYS Will, above is representative of my medical decision making.    Oscar Rodriguez MD

## 2024-04-29 NOTE — CARE PLAN
The patient's goals for the shift include      The clinical goals for the shift include patient will not complain of pain    Over the shift, the patient did not make progress toward the following goals. Barriers to progression include patient remained HDS. Recommendations to address these barriers include continue with plan of care.

## 2024-04-29 NOTE — ANESTHESIA POSTPROCEDURE EVALUATION
"Patient: Lexis Denny \"Alycia\"    Procedure Summary       Date: 04/29/24 Room / Location: [facility]    Anesthesia Start: 1343 Anesthesia Stop: 1412    Procedure: TRANSESOPHAGEAL ECHO (BETHEL) W/ POSSIBLE CARDIOVERSION Diagnosis:       Atrial fibrillation, unspecified type (Multi)      Atrial fibrillation, permanent (Multi)      (Afib)    Scheduled Providers: Oscar Rodriguez MD Responsible Provider: Suleiman Mcleod MD    Anesthesia Type: MAC ASA Status: 3            Anesthesia Type: MAC    Vitals Value Taken Time   /62 04/29/24 1412   Temp 36 04/29/24 1413   Pulse 76 04/29/24 1412   Resp 16 04/29/24 1412   SpO2 97 % 04/29/24 1412       Anesthesia Post Evaluation    Patient location: CV lab.  Patient participation: complete - patient cannot participate  Level of consciousness: awake and alert  Pain score: 0  Pain management: adequate  Airway patency: patent  Cardiovascular status: acceptable  Respiratory status: acceptable  Hydration status: acceptable  Postoperative Nausea and Vomiting: none        There were no known notable events for this encounter.    "

## 2024-04-30 ENCOUNTER — DOCUMENTATION (OUTPATIENT)
Dept: HOME HEALTH SERVICES | Facility: HOME HEALTH | Age: 74
End: 2024-04-30
Payer: MEDICARE

## 2024-04-30 ENCOUNTER — HOME HEALTH ADMISSION (OUTPATIENT)
Dept: HOME HEALTH SERVICES | Facility: HOME HEALTH | Age: 74
End: 2024-04-30
Payer: MEDICARE

## 2024-04-30 NOTE — HH CARE COORDINATION
Home Care received a Referral for Nursing, Physical Therapy, and Occupational Therapy. We have processed the referral for a Start of Care on 5.01.24 to 5.02.24.     If you have any questions or concerns, please feel free to contact us at [phone]. Follow the prompts, enter your five digit zip code, and you will be directed to your care team on EAST 3.

## 2024-05-01 ENCOUNTER — HOME CARE VISIT (OUTPATIENT)
Dept: HOME HEALTH SERVICES | Facility: HOME HEALTH | Age: 74
End: 2024-05-01
Payer: MEDICARE

## 2024-05-01 ENCOUNTER — PATIENT OUTREACH (OUTPATIENT)
Dept: HOME HEALTH SERVICES | Age: 74
End: 2024-05-01
Payer: MEDICARE

## 2024-05-01 VITALS
TEMPERATURE: 95 F | HEIGHT: 64 IN | WEIGHT: 250 LBS | DIASTOLIC BLOOD PRESSURE: 60 MMHG | HEART RATE: 72 BPM | SYSTOLIC BLOOD PRESSURE: 110 MMHG | BODY MASS INDEX: 42.68 KG/M2 | OXYGEN SATURATION: 95 % | RESPIRATION RATE: 20 BRPM

## 2024-05-01 DIAGNOSIS — I50.9 CONGESTIVE HEART FAILURE, UNSPECIFIED HF CHRONICITY, UNSPECIFIED HEART FAILURE TYPE (MULTI): ICD-10-CM

## 2024-05-01 DIAGNOSIS — E11.9 TYPE 2 DIABETES MELLITUS WITHOUT COMPLICATION, UNSPECIFIED WHETHER LONG TERM INSULIN USE (MULTI): ICD-10-CM

## 2024-05-01 DIAGNOSIS — I48.91 ATRIAL FIBRILLATION WITH RVR (MULTI): Primary | ICD-10-CM

## 2024-05-01 PROCEDURE — 0023 HH SOC

## 2024-05-01 PROCEDURE — G0299 HHS/HOSPICE OF RN EA 15 MIN: HCPCS

## 2024-05-01 SDOH — SOCIAL STABILITY: SOCIAL INSECURITY: WITHIN THE LAST YEAR, HAVE YOU BEEN AFRAID OF YOUR PARTNER OR EX-PARTNER?: NO

## 2024-05-01 SDOH — SOCIAL STABILITY: SOCIAL NETWORK: HOW OFTEN DO YOU GET TOGETHER WITH FRIENDS OR RELATIVES?: NEVER

## 2024-05-01 SDOH — SOCIAL STABILITY: SOCIAL NETWORK
DO YOU BELONG TO ANY CLUBS OR ORGANIZATIONS SUCH AS CHURCH GROUPS UNIONS, FRATERNAL OR ATHLETIC GROUPS, OR SCHOOL GROUPS?: NO

## 2024-05-01 SDOH — SOCIAL STABILITY: SOCIAL INSECURITY
WITHIN THE LAST YEAR, HAVE YOU BEEN KICKED, HIT, SLAPPED, OR OTHERWISE PHYSICALLY HURT BY YOUR PARTNER OR EX-PARTNER?: NO

## 2024-05-01 SDOH — ECONOMIC STABILITY: INCOME INSECURITY: HOW HARD IS IT FOR YOU TO PAY FOR THE VERY BASICS LIKE FOOD, HOUSING, MEDICAL CARE, AND HEATING?: NOT HARD AT ALL

## 2024-05-01 SDOH — HEALTH STABILITY: PHYSICAL HEALTH: ON AVERAGE, HOW MANY MINUTES DO YOU ENGAGE IN EXERCISE AT THIS LEVEL?: 0 MIN

## 2024-05-01 SDOH — SOCIAL STABILITY: SOCIAL NETWORK: ARE YOU MARRIED, WIDOWED, DIVORCED, SEPARATED, NEVER MARRIED, OR LIVING WITH A PARTNER?: MARRIED

## 2024-05-01 SDOH — ECONOMIC STABILITY: INCOME INSECURITY: IN THE LAST 12 MONTHS, WAS THERE A TIME WHEN YOU WERE NOT ABLE TO PAY THE MORTGAGE OR RENT ON TIME?: NO

## 2024-05-01 SDOH — HEALTH STABILITY: MENTAL HEALTH
HOW OFTEN DO YOU NEED TO HAVE SOMEONE HELP YOU WHEN YOU READ INSTRUCTIONS, PAMPHLETS, OR OTHER WRITTEN MATERIAL FROM YOUR DOCTOR OR PHARMACY?: ALWAYS

## 2024-05-01 SDOH — HEALTH STABILITY: PHYSICAL HEALTH

## 2024-05-01 SDOH — ECONOMIC STABILITY: FOOD INSECURITY: WITHIN THE PAST 12 MONTHS, YOU WORRIED THAT YOUR FOOD WOULD RUN OUT BEFORE YOU GOT MONEY TO BUY MORE.: NEVER TRUE

## 2024-05-01 SDOH — SOCIAL STABILITY: SOCIAL INSECURITY
WITHIN THE LAST YEAR, HAVE TO BEEN RAPED OR FORCED TO HAVE ANY KIND OF SEXUAL ACTIVITY BY YOUR PARTNER OR EX-PARTNER?: NO

## 2024-05-01 SDOH — ECONOMIC STABILITY: FOOD INSECURITY: WITHIN THE PAST 12 MONTHS, THE FOOD YOU BOUGHT JUST DIDN'T LAST AND YOU DIDN'T HAVE MONEY TO GET MORE.: NEVER TRUE

## 2024-05-01 SDOH — SOCIAL STABILITY: SOCIAL INSECURITY: WITHIN THE LAST YEAR, HAVE YOU BEEN HUMILIATED OR EMOTIONALLY ABUSED IN OTHER WAYS BY YOUR PARTNER OR EX-PARTNER?: NO

## 2024-05-01 SDOH — SOCIAL STABILITY: SOCIAL NETWORK: HOW OFTEN DO YOU ATTEND CHURCH OR RELIGIOUS SERVICES?: NEVER

## 2024-05-01 SDOH — HEALTH STABILITY: MENTAL HEALTH: HOW OFTEN DO YOU HAVE 6 OR MORE DRINKS ON ONE OCCASION?: NEVER

## 2024-05-01 SDOH — HEALTH STABILITY: MENTAL HEALTH: HOW OFTEN DO YOU HAVE A DRINK CONTAINING ALCOHOL?: NEVER

## 2024-05-01 SDOH — ECONOMIC STABILITY: FOOD INSECURITY: MEALS PER DAY: 2

## 2024-05-01 SDOH — SOCIAL STABILITY: SOCIAL NETWORK: HOW OFTEN DO YOU ATTENT MEETINGS OF THE CLUB OR ORGANIZATION YOU BELONG TO?: NEVER

## 2024-05-01 SDOH — HEALTH STABILITY: MENTAL HEALTH
STRESS IS WHEN SOMEONE FEELS TENSE, NERVOUS, ANXIOUS, OR CAN'T SLEEP AT NIGHT BECAUSE THEIR MIND IS TROUBLED. HOW STRESSED ARE YOU?: NOT AT ALL

## 2024-05-01 SDOH — ECONOMIC STABILITY: INCOME INSECURITY: IN THE PAST 12 MONTHS, HAS THE ELECTRIC, GAS, OIL, OR WATER COMPANY THREATENED TO SHUT OFF SERVICE IN YOUR HOME?: NO

## 2024-05-01 SDOH — ECONOMIC STABILITY: HOUSING INSECURITY: IN THE LAST 12 MONTHS, HOW MANY PLACES HAVE YOU LIVED?: 1

## 2024-05-01 SDOH — HEALTH STABILITY: MENTAL HEALTH: HOW MANY STANDARD DRINKS CONTAINING ALCOHOL DO YOU HAVE ON A TYPICAL DAY?: PATIENT DOES NOT DRINK

## 2024-05-01 SDOH — SOCIAL STABILITY: SOCIAL NETWORK
IN A TYPICAL WEEK, HOW MANY TIMES DO YOU TALK ON THE PHONE WITH FAMILY, FRIENDS, OR NEIGHBORS?: MORE THAN THREE TIMES A WEEK

## 2024-05-01 ASSESSMENT — ENCOUNTER SYMPTOMS
HIGHEST PAIN SEVERITY IN PAST 24 HOURS: 8/10
DEPRESSION: 0
LOWEST PAIN SEVERITY IN PAST 24 HOURS: 5/10
PAIN LOCATION - PAIN QUALITY: ACHING
PAIN LOCATION - PAIN QUALITY: ACHING
STOOL FREQUENCY: DAILY
PAIN LOCATION - PAIN SEVERITY: 6/10
PAIN LOCATION - PAIN QUALITY: ACHING
PAIN LOCATION - PAIN FREQUENCY: CONSTANT
PAIN SEVERITY GOAL: 4/10
PAIN LOCATION - PAIN SEVERITY: 6/10
OCCASIONAL FEELINGS OF UNSTEADINESS: 0
PAIN LOCATION: BACK
APPETITE LEVEL: FAIR
PAIN: 1
CHANGE IN APPETITE: DECREASED
BOWEL PATTERN NORMAL: 1
PAIN LOCATION - PAIN FREQUENCY: CONSTANT
PAIN LOCATION: LEFT FOOT
SUBJECTIVE PAIN PROGRESSION: GRADUALLY IMPROVING
PAIN LOCATION - RELIEVING FACTORS: REST/MEDS
PERSON REPORTING PAIN: PATIENT
PAIN LOCATION - PAIN SEVERITY: 6/10
PAIN LOCATION - PAIN FREQUENCY: CONSTANT
LOSS OF SENSATION IN FEET: 0
PAIN LOCATION: RIGHT FOOT
PAIN LOCATION - EXACERBATING FACTORS: ACTIVITY

## 2024-05-01 ASSESSMENT — LIFESTYLE VARIABLES
SKIP TO QUESTIONS 9-10: 1
SMOKING_STATUS: NEVER SMOKED
SMOKING_STATUS: 0
AUDIT-C TOTAL SCORE: 0

## 2024-05-01 ASSESSMENT — PAIN SCALES - PAIN ASSESSMENT IN ADVANCED DEMENTIA (PAINAD)
BODYLANGUAGE: 0 - RELAXED.
FACIALEXPRESSION: 0
CONSOLABILITY: 0 - NO NEED TO CONSOLE.
NEGVOCALIZATION: 0
CONSOLABILITY: 0
BODYLANGUAGE: 0
TOTALSCORE: 0
FACIALEXPRESSION: 0 - SMILING OR INEXPRESSIVE.
BREATHING: 0
NEGVOCALIZATION: 0 - NONE.

## 2024-05-01 ASSESSMENT — ACTIVITIES OF DAILY LIVING (ADL): OASIS_M1830: 01

## 2024-05-01 NOTE — SIGNIFICANT EVENT
Follow Up Phone Call    Outgoing phone call    Spoke to: Lexis Denny Relationship:self   Phone number: [phone]      Outcome: contacted patient/ family   Chief Complaint   Patient presents with   • Shortness of Breath   • Palpitations          Diagnosis:Not applicable    States she is feeling better. No further questions or concerns.

## 2024-05-01 NOTE — PROGRESS NOTES
Patient agreeable to [facility] (Healthy at Home). Enrollment call completed and program overview explained to patient, with appropriate numbers/info given. Initial visit is 5/2/24  0800.    Patient was admitted with new onset afib from [facility] 4/25-4/29, originally went to see her PCP and she was sent to the ED for management and had a BETHEL with cardioversion on 4/29 and converted to NSR. Newly started on eliquis metop and losartan and is seeing cardiology this month (Michael). Patient has HC and had SOC today 5/1 with other disciplines. She also reports a hx of DM with neuropathy and bad osteoarthritis, borderline debilitated from it, but still able to provide car/do ADLs. No oxygen or other DME, pt reports a freestyle bria and mounjaro (also for wt loss) for diabetes management.

## 2024-05-02 ENCOUNTER — PATIENT OUTREACH (OUTPATIENT)
Dept: HOME HEALTH SERVICES | Age: 74
End: 2024-05-02

## 2024-05-02 ENCOUNTER — TELEMEDICINE (OUTPATIENT)
Dept: PHARMACY | Facility: HOSPITAL | Age: 74
End: 2024-05-02
Payer: MEDICARE

## 2024-05-02 ENCOUNTER — HOME CARE VISIT (OUTPATIENT)
Dept: HOME HEALTH SERVICES | Facility: HOME HEALTH | Age: 74
End: 2024-05-02
Payer: MEDICARE

## 2024-05-02 ENCOUNTER — PHARMACY VISIT (OUTPATIENT)
Dept: PHARMACY | Facility: CLINIC | Age: 74
End: 2024-05-02
Payer: COMMERCIAL

## 2024-05-02 DIAGNOSIS — E11.9 TYPE 2 DIABETES MELLITUS WITHOUT COMPLICATION, UNSPECIFIED WHETHER LONG TERM INSULIN USE (MULTI): ICD-10-CM

## 2024-05-02 DIAGNOSIS — I50.9 CONGESTIVE HEART FAILURE, UNSPECIFIED HF CHRONICITY, UNSPECIFIED HEART FAILURE TYPE (MULTI): ICD-10-CM

## 2024-05-02 DIAGNOSIS — I48.91 ATRIAL FIBRILLATION WITH RVR (MULTI): ICD-10-CM

## 2024-05-02 DIAGNOSIS — I50.9 HEART FAILURE, UNSPECIFIED (MULTI): ICD-10-CM

## 2024-05-02 PROCEDURE — RXMED WILLOW AMBULATORY MEDICATION CHARGE

## 2024-05-02 NOTE — HOME HEALTH
Pt SOC today. Was with our agency 3-4 years ago. Alert and oriented x 4. Consents esigned. Aware of emergency contact/when to call 911. Lives with her spouse Gene. Pt was recently diagnosed with A Fib had a bout with RVR. Heart rate stable now at 72 and regular. VSS. Medication reconcilation reviewed/reconciled. Pt and spouse agreed to Nurse coming once a week for 4 weeks. Agreed to Fridays and requesting this nurse to do visits whenever possible because we worked together last episode. none

## 2024-05-02 NOTE — PROGRESS NOTES
Daily Call Note: [facility] weekly call complete, w this RN, Dr Verma and Tushar Pharm D.  Pt reports she is feeling well.  Denies CP/SOB. Reviewed importance of daily weight, low sodium and diet, and daily B/P.  Pt in agreement.  Pt does have CGM monitor.   Medications reviewed w Tushar, no refills needed at this time.  Pt will begin Jardiance.  No other concerns voiced.  Next [facility] weekly call 5/9 at 0800.      Pt Education: POC   Barriers:   Topics for Daily Review:  VS/ weight   Pt demonstrates clear understanding: Yes    Daily Weight:  There were no vitals filed for this visit.   Last 3 Weights:  Wt Readings from Last 7 Encounters:   05/01/24 113 kg (250 lb)   04/25/24 115 kg (254 lb 6.6 oz)   04/25/24 113 kg (250 lb)   12/29/22 106 kg (233 lb)   11/29/22 106 kg (233 lb)   11/28/22 106 kg (233 lb)   09/30/22 104 kg (229 lb 4 oz)       Masimo Device: No   Masimo Clinical Impression:     Virtual Visits--Scheduled (Most Recent Date at Top)  Follow up Appointments  Recent Visits  Date Type Provider Dept   04/25/24 Office Visit Jennifer Allen, PITA-CNP [facility] [place]   Showing recent visits within past 30 days and meeting all other requirements  Future Appointments  No visits were found meeting these conditions.  Showing future appointments within next 90 days and meeting all other requirements       Frequency of RN Calls & Virtual Visits per Team Agreement: [facility] Home Frequency: Daily    Medication issues Addressed (what was done):  Jardiance ordered    Follow up appointments scheduled by [facility] Staff:   Referrals made by [facility] staff:

## 2024-05-02 NOTE — PROGRESS NOTES
"Pharmacy Post-Discharge Visit    Lexis Denny \"Yessica" is a 74 y.o. female was referred to Clinical Pharmacy Team to complete a post-discharge medication optimization and monitoring visit.  The patient was referred for their CHF, new onset of Afib and also to monitor their diabetes management. She is also currently enrolled in our [facility].     Admission Date: 4/25/24  Discharge Date: 4/29/24    Referring Provider: Titi Verma MD  PCP: PITA Rodriguez-JIE       Subjective   Allergies   Allergen Reactions    Guaifenesin-Dm Cardiac arrhythmia/arrest and Anxiety    Aspirin Other    Clarithromycin Other    Metformin GI Upset    Rosuvastatin Unknown       [id number] - [place], [place] - [address] [facility]  [address] [facility] [place] [address]  Phone: [phone] Fax: [phone]    [facility]  [address], [address]  [facility] [address]  Phone: [phone] Fax: [phone]    [id number] [id number] [facility] [address]  Phone: [phone] Fax: [phone]    [facility]  [address] [facility] [address]  Phone: [phone] Fax: [phone]      Medication System Management:  Affordability/Accessibility: Already enrolled in PAP - will add eliquis and jardiance   Adherence/Organization: no concerns       Social History     Social History Narrative    Not on file        Notable Medication changes following discharge:  Start: eliquis, lasix and metoprolol   Stop: none  Change: losartan    HPI  CHF ASSESSMENT  Staging:  Most recent ejection fraction: 40%  NYHA Stage: III  ACC/AHA Stage: C    Symptom Assessment:  Weight changes/edema?: No  Dyspnea?: With exertion  Dizziness/syncope/palpitations?: No    Current Regimen:  ARNI/ACEi/ARB: Yes - losartan  Beta Blocker: Yes - metoprolol  MRA: No  SGLT2i: No    Other therapy:  Eliquis   Lasix     Secondary Prevention:  The 10-year " ASCVD risk score (Veronica CARABALLO, et al., 2019) is: 26.3%    Values used to calculate the score:      Age: 74 years      Sex: Female      Is Non- : No      Diabetic: Yes      Tobacco smoker: No      Systolic Blood Pressure: 110 mmHg      Is BP treated: Yes      HDL Cholesterol: 31.3 mg/dL      Total Cholesterol: 200 mg/dL    Aspirin 81mg? no  Statin?: No  HTN?: No     Review of Systems        Objective     There were no vitals taken for this visit.   BP Readings from Last 4 Encounters:   05/01/24 110/60   04/29/24 126/80   04/25/24 (!) 170/130   12/29/22 138/76      There were no vitals filed for this visit.     LAB  Lab Results   Component Value Date    BILITOT 0.4 06/05/2023    CALCIUM 8.8 04/29/2024    CO2 30 04/29/2024    CL 98 04/29/2024    CREATININE 0.96 04/29/2024    GLUCOSE 107 (H) 04/29/2024    ALKPHOS 52 06/05/2023    K 3.0 (L) 04/29/2024    PROT 7.2 06/05/2023     04/29/2024    AST 33 06/05/2023    ALT 41 06/05/2023    BUN 18 04/29/2024    ANIONGAP 13 04/29/2024    MG 1.98 04/29/2024    PHOS 3.3 04/25/2024    ALBUMIN 4.3 06/05/2023    GFRF 80 06/05/2023     Lab Results   Component Value Date    TRIG 177 (H) 12/16/2022    CHOL 200 (H) 12/16/2022    HDL 31.3 (A) 12/16/2022     Lab Results   Component Value Date    HGBA1C 8.1 (A) 06/05/2023         Current Outpatient Medications   Medication Instructions    cholecalciferol (Vitamin D-3) 50 MCG (2000 UT) tablet 1 tablet, oral, Every morning    Eliquis 5 mg, oral, 2 times daily    flash glucose sensor kit (FreeStyle Gabbi 2 Sensor) kit Change the sensor every 14 days    fluticasone (Flonase Allergy Relief) 50 mcg/actuation nasal spray 2 sprays, Each Nostril, Daily    furosemide (LASIX) 40 mg, oral, Daily    ibuprofen 200 mg tablet 1 tablet, oral, Every 6 hours    loratadine 10 mg capsule oral    losartan (COZAAR) 25 mg, oral, Daily    metoprolol tartrate (LOPRESSOR) 50 mg, oral, 2 times daily    Mounjaro 5 mg, subcutaneous, Once  Weekly    multivit-min-folic acid-biotin (Women's Multivitamin w-Biotin) 200-300 mcg tablet,chewable 1 tablet, oral, Daily    psyllium (Metamucil) 3.4 gram packet 1 packet, oral, Every morning        HISTORICAL PHARMACOTHERAPY  GDMT --> was only on ARB prior to admission   DM --> has been on metformin, januvia, repaglinide and ozempic    DRUG INTERACTIONS  None at time of review      Assessment/Plan   Problem List Items Addressed This Visit       Diabetes mellitus, type 2 (Multi)    Atrial fibrillation with RVR (Multi)     Other Visit Diagnoses       Congestive heart failure, unspecified HF chronicity, unspecified heart failure type (Multi)              DM   Did not discuss her sugars today  She wears CGM and is currently on mounjaro 5mg once weekly (missed last dose due to being in the hospital)  But being managed by other clinical pharmacist   Afib   Has felt really good since being home   No feelings of palpitations and not having any chest pain  Continue metoprolol and eliquis   Will add eliquis to PAP  CHF   Continue GDMT --> metoprolol and losartan   Add jardiance 10mg daily --> can also add to her PAP  Can discuss adding spironolactone at next visit - K+ has been low so would be beneficial for that but BP's look to be on lower end  Has BP cuff and scale but has not been monitoring since being home - discussed to get daily weight first thing in the morning after going to the bathroom and then BP at least once daily and keep log for next visit     Follow Up: 1 week     Continue all meds under the continuation of care with the referring provider and clinical pharmacy team.    Tushar Caceres, [place]     Verbal consent to manage patient's drug therapy was obtained from the patient. They were informed they may decline to participate or withdraw from participation in pharmacy services at any time.

## 2024-05-03 ENCOUNTER — HOME CARE VISIT (OUTPATIENT)
Dept: HOME HEALTH SERVICES | Facility: HOME HEALTH | Age: 74
End: 2024-05-03
Payer: MEDICARE

## 2024-05-03 ENCOUNTER — PATIENT OUTREACH (OUTPATIENT)
Dept: HOME HEALTH SERVICES | Age: 74
End: 2024-05-03
Payer: MEDICARE

## 2024-05-03 VITALS — HEART RATE: 66 BPM | OXYGEN SATURATION: 99 % | TEMPERATURE: 97.2 F

## 2024-05-03 DIAGNOSIS — E11.65 TYPE 2 DIABETES MELLITUS WITH HYPERGLYCEMIA, WITHOUT LONG-TERM CURRENT USE OF INSULIN (MULTI): ICD-10-CM

## 2024-05-03 PROCEDURE — G0152 HHCP-SERV OF OT,EA 15 MIN: HCPCS

## 2024-05-03 ASSESSMENT — ENCOUNTER SYMPTOMS
PAIN LOCATION - PAIN QUALITY: ACHING
PAIN LOCATION - EXACERBATING FACTORS: MOVEMENT
PAIN LOCATION - PAIN DURATION: DAILY
PAIN LOCATION - PAIN FREQUENCY: INTERMITTENT
PAIN LOCATION - PAIN DURATION: DAILY
PAIN LOCATION - PAIN SEVERITY: 7/10
PAIN LOCATION - EXACERBATING FACTORS: MOVEMENT
PAIN LOCATION: LEFT LEG
OCCASIONAL FEELINGS OF UNSTEADINESS: 1
PAIN LOCATION - RELIEVING FACTORS: DIET
PAIN LOCATION - PAIN QUALITY: ACHING
HIGHEST PAIN SEVERITY IN PAST 24 HOURS: 8/10
PAIN LOCATION - PAIN SEVERITY: 7/10
PAIN: 1
PAIN LOCATION - RELIEVING FACTORS: DIET
PERSON REPORTING PAIN: PATIENT
PAIN LOCATION: RIGHT LEG
PAIN LOCATION - PAIN FREQUENCY: INTERMITTENT

## 2024-05-03 ASSESSMENT — ACTIVITIES OF DAILY LIVING (ADL)
WASHING_UPB_CURRENT_FUNCTION: INDEPENDENT
GROOMING_WITHIN_DEFINED_LIMITS: 1
FEEDING_WITHIN_DEFINED_LIMITS: 1
WASHING_LB_CURRENT_FUNCTION: INDEPENDENT

## 2024-05-03 NOTE — PROGRESS NOTES
Daily Call Note:   Patient states she will not monitor her weight. Patient states she is on Mounjaro and it is suppose to help her lose weight and weighing herself will upset her.      Patient was ordered Jardiance during the [facility] call on 5/2,  Patient stated she had a very bad reaction to Jardiance.  She thought it was something else.  Patient stated she will not take the medication until she see's Dr. Rodriguez, Cardiology.  Cardiology appointment is scheduled May 24th.   Notified Tushar and Dr. Becker via secure chat.     Patient did not take her blood pressure today and stated she will take it in the afternoon..  I advised patient to take blood pressure in the morning.  Patient agrees with the plan  Patient declined Masimo  Patient did not take blood sugar today.  Patient also has a dexcom.  Patent agrees to monitor blood sugar daily.    I scheduled a PCP appointment with Lisa Allen, May 29 th @ 10:25. Patient aware/    Daily Call Note:         Pt Education:   Barriers:   Topics for Daily Review:   Pt demonstrates clear understanding:     Daily Weight:  There were no vitals filed for this visit.   Last 3 Weights:  Wt Readings from Last 7 Encounters:   05/01/24 113 kg (250 lb)   04/25/24 115 kg (254 lb 6.6 oz)   04/25/24 113 kg (250 lb)   12/29/22 106 kg (233 lb)   11/29/22 106 kg (233 lb)   11/28/22 106 kg (233 lb)   09/30/22 104 kg (229 lb 4 oz)       M

## 2024-05-04 ENCOUNTER — DOCUMENTATION (OUTPATIENT)
Dept: HOME HEALTH SERVICES | Age: 74
End: 2024-05-04
Payer: MEDICARE

## 2024-05-04 NOTE — PROGRESS NOTES
5/4/24 Patient returned call. Stated that she is doing good. Has not taken BP yet, she likes to for her  to take it but he is asleep. He will take BP once he is awake. Patient educated on keeping a daily log for blood pressures and blood glucose. [facility]. Denies any new or worsening symptoms. Will call back to give nurse her BP.

## 2024-05-06 ENCOUNTER — PATIENT OUTREACH (OUTPATIENT)
Dept: HOME HEALTH SERVICES | Age: 74
End: 2024-05-06
Payer: MEDICARE

## 2024-05-06 VITALS — HEART RATE: 68 BPM | DIASTOLIC BLOOD PRESSURE: 87 MMHG | SYSTOLIC BLOOD PRESSURE: 121 MMHG

## 2024-05-06 NOTE — PROGRESS NOTES
Daily Call Note:   Mrs. Denny states she is feeling fine today  May 5th   09:00  /93  HR 68       21:00  /87 HR 68      May 4th   09:00   /89  21:00   /83       Patient requested calls @ 0800.  Patient states her blood pressure medications around 0400 and check her blood pressure  around 0930 when her  is able to assist her.      Patient was reporting previous day vital during daily call.    Patient agrees to change call time to 10:00 daily. .     Pt Education:   Barriers:   Topics for Daily Review:   Pt demonstrates clear understanding:     Daily Weight:  There were no vitals filed for this visit.   Last 3 Weights:  Wt Readings from Last 7 Encounters:   05/01/24 113 kg (250 lb)   04/25/24 115 kg (254 lb 6.6 oz)   04/25/24 113 kg (250 lb)   12/29/22 106 kg (233 lb)   11/29/22 106 kg (233 lb)   11/28/22 106 kg (233 lb)   09/30/22 104 kg (229 lb 4 oz)       Masimo Device:    Masimo Clinical Impression:     Virtual Visits--Scheduled (Most Recent Date at Top)  Follow up Appointments  Recent Visits  Date Type Provider Dept   04/25/24 Office Visit OUMAR Rodriguez [facility]   Showing recent visits within past 30 days and meeting all other requirements  Future Appointments  Date Type Provider Dept   05/29/24 Appointment OUMAR Rodriguez [facility]   Showing future appointments within next 90 days and meeting all other requirements       Frequency of RN Calls & Virtual Visits per Team Agreement:     Medication issues Addressed (what was done):     Follow up appointments scheduled by [facility] Staff:   Referrals made by [facility] staff:

## 2024-05-06 NOTE — PROGRESS NOTES
Pt called [facility] to report am VS.  B/P 129/84, HR 70,   No concerns voiced

## 2024-05-06 NOTE — TELEPHONE ENCOUNTER
Looks like Rx request was sent by pharmacy -   she was previously signed up for PAP   through .     Alycia left a VM after hours on Friday stating she is home from the hospital. She was told by nurse from healthy at  home program that she has to be re-signed up for mounjaro. She is asking for you to check in and see what needs to be done. She says she doesn't want a gap. She states she did skip one week while in the hospital. She'd like to be called in regard.

## 2024-05-07 ENCOUNTER — PATIENT OUTREACH (OUTPATIENT)
Dept: HOME HEALTH SERVICES | Age: 74
End: 2024-05-07
Payer: MEDICARE

## 2024-05-07 VITALS — OXYGEN SATURATION: 98 % | SYSTOLIC BLOOD PRESSURE: 133 MMHG | DIASTOLIC BLOOD PRESSURE: 80 MMHG | HEART RATE: 70 BPM

## 2024-05-07 RX ORDER — TIRZEPATIDE 5 MG/.5ML
5 INJECTION, SOLUTION SUBCUTANEOUS
Qty: 2 ML | Refills: 3 | Status: SHIPPED | OUTPATIENT
Start: 2024-05-12 | End: 2024-05-23 | Stop reason: SDUPTHER

## 2024-05-07 NOTE — TELEPHONE ENCOUNTER
Please notify patient.  I will reorder her Mounjaro.  It is ok that she missed a dose while in the hospital.  She can get back on once weekly schedule.

## 2024-05-07 NOTE — PROGRESS NOTES
Daily call completed patient states she feels good except for nagging cough se attributed to allergies FBG this am 126 /80 HR 70 POX 98 % she did check her BP last evening 115/87 about 1930 no medications needs questions and concerns addressed reviewed upcoming [facility]

## 2024-05-08 ENCOUNTER — PATIENT OUTREACH (OUTPATIENT)
Dept: HOME HEALTH SERVICES | Age: 74
End: 2024-05-08
Payer: MEDICARE

## 2024-05-08 NOTE — PROGRESS NOTES
Daily Call Note:  [facility] daily call complete.  Pt reports she is feeling well.  No concerns voiced.  Continues w [facility].  Pt states she is not taking Jardiance.  Wants to speak w Dr Rodriguez before taking.  Has appt w Dr Rodriguez 5/22/24.    B/P 129/81  HR 77  POX 98 % RA.  Verified upcoming weekly [facility] call    Pt Education: POC  Barriers:   Topics for Daily Review:  VS/ BGM  Pt demonstrates clear understanding: Yes    Daily Weight:  There were no vitals filed for this visit.   Last 3 Weights:  Wt Readings from Last 7 Encounters:   05/01/24 113 kg (250 lb)   04/25/24 115 kg (254 lb 6.6 oz)   04/25/24 113 kg (250 lb)   12/29/22 106 kg (233 lb)   11/29/22 106 kg (233 lb)   11/28/22 106 kg (233 lb)   09/30/22 104 kg (229 lb 4 oz)       Masimo Device: No   Masimo Clinical Impression:     Virtual Visits--Scheduled (Most Recent Date at Top)  Follow up Appointments  Recent Visits  Date Type Provider Dept   04/25/24 Office Visit OUMAR Rodriguez [place] [address] [facility]   Showing recent visits within past 30 days and meeting all other requirements  Future Appointments  Date Type Provider Dept   05/29/24 Appointment OUMAR Rodriguez [place] [address] [facility]   Showing future appointments within next 90 days and meeting all other requirements       Frequency of RN Calls & Virtual Visits per Team Agreement: Healthy at Home Frequency: Daily    Medication issues Addressed (what was done):     Follow up appointments scheduled by [facility] Staff:   Referrals made by [facility] staff:

## 2024-05-09 ENCOUNTER — TELEMEDICINE (OUTPATIENT)
Dept: CARE COORDINATION | Age: 74
End: 2024-05-09
Payer: MEDICARE

## 2024-05-09 ENCOUNTER — PATIENT OUTREACH (OUTPATIENT)
Dept: HOME HEALTH SERVICES | Age: 74
End: 2024-05-09

## 2024-05-09 VITALS — HEART RATE: 76 BPM | SYSTOLIC BLOOD PRESSURE: 123 MMHG | DIASTOLIC BLOOD PRESSURE: 81 MMHG

## 2024-05-09 DIAGNOSIS — I48.91 ATRIAL FIBRILLATION WITH RAPID VENTRICULAR RESPONSE (MULTI): ICD-10-CM

## 2024-05-09 DIAGNOSIS — I10 ESSENTIAL HYPERTENSION: Primary | ICD-10-CM

## 2024-05-09 PROCEDURE — RXMED WILLOW AMBULATORY MEDICATION CHARGE

## 2024-05-09 RX ORDER — TIRZEPATIDE 2.5 MG/.5ML
2.5 INJECTION, SOLUTION SUBCUTANEOUS
Qty: 2 ML | Refills: 3 | Status: SHIPPED | OUTPATIENT
Start: 2024-05-12 | End: 2024-05-23 | Stop reason: ALTCHOICE

## 2024-05-09 ASSESSMENT — ACTIVITIES OF DAILY LIVING (ADL): ENTERING_EXITING_HOME: NEEDS ASSISTANCE

## 2024-05-09 NOTE — PROGRESS NOTES
Daily Call Note: [facility] call completed with physicianand this nurse and patient, she states she feels great  denies any sob or leg swelling c/o some neuropathy in toes, declines to weigh self. Confirms not taking Jardiance, states had bad reaction to it previously, vs noted from call earlier today. Next [facility] call scheduled on 5/16 @ 1100, confirmed with patient    Pt Education: continue good self care  Barriers:   Topics for Daily Review: rbs/bp  Pt demonstrates clear understanding: Yes    Daily Weight:  There were no vitals filed for this visit.   Last 3 Weights:  Wt Readings from Last 7 Encounters:   05/01/24 113 kg (250 lb)   04/25/24 115 kg (254 lb 6.6 oz)   04/25/24 113 kg (250 lb)   12/29/22 106 kg (233 lb)   11/29/22 106 kg (233 lb)   11/28/22 106 kg (233 lb)   09/30/22 104 kg (229 lb 4 oz)       Masimo Device: No   Masimo Clinical Impression:     Virtual Visits--Scheduled (Most Recent Date at Top)  Follow up Appointments  Recent Visits  Date Type Provider Dept   04/25/24 Office Visit OUMAR Rodriguez [facility]   Showing recent visits within past 30 days and meeting all other requirements  Future Appointments  Date Type Provider Dept   05/29/24 Appointment OUMAR Rodriguez [facility]   Showing future appointments within next 90 days and meeting all other requirements       Frequency of RN Calls & Virtual Visits per Team Agreement: [facility] Frequency: Daily

## 2024-05-09 NOTE — PROGRESS NOTES
Incoming call from patient because she missed her 0800 weekly HHVC this morning. She denies pain but states that she is feeling weak and using her walker more. She also took an extra allergy pill today, feeling allergies are more active. She reports blood sugar at 127 today. Rescheduled HHVC for today at 1800, verbalized understanding.     /81 HR 76

## 2024-05-10 ENCOUNTER — PHARMACY VISIT (OUTPATIENT)
Dept: PHARMACY | Facility: CLINIC | Age: 74
End: 2024-05-10
Payer: COMMERCIAL

## 2024-05-10 ENCOUNTER — PATIENT OUTREACH (OUTPATIENT)
Dept: HOME HEALTH SERVICES | Age: 74
End: 2024-05-10
Payer: MEDICARE

## 2024-05-10 ENCOUNTER — TELEPHONE (OUTPATIENT)
Dept: PRIMARY CARE | Facility: CLINIC | Age: 74
End: 2024-05-10
Payer: MEDICARE

## 2024-05-10 ENCOUNTER — HOSPITAL ENCOUNTER (OUTPATIENT)
Dept: CARDIOLOGY | Facility: HOSPITAL | Age: 74
Discharge: HOME | End: 2024-05-10
Payer: MEDICARE

## 2024-05-10 ENCOUNTER — HOSPITAL ENCOUNTER (EMERGENCY)
Facility: HOSPITAL | Age: 74
Discharge: HOME | End: 2024-05-10
Payer: MEDICARE

## 2024-05-10 ENCOUNTER — APPOINTMENT (OUTPATIENT)
Dept: CARDIOLOGY | Facility: HOSPITAL | Age: 74
End: 2024-05-10
Payer: MEDICARE

## 2024-05-10 ENCOUNTER — HOME CARE VISIT (OUTPATIENT)
Dept: HOME HEALTH SERVICES | Facility: HOME HEALTH | Age: 74
End: 2024-05-10
Payer: MEDICARE

## 2024-05-10 ENCOUNTER — APPOINTMENT (OUTPATIENT)
Dept: RADIOLOGY | Facility: HOSPITAL | Age: 74
End: 2024-05-10
Payer: MEDICARE

## 2024-05-10 VITALS
OXYGEN SATURATION: 95 % | SYSTOLIC BLOOD PRESSURE: 130 MMHG | RESPIRATION RATE: 18 BRPM | DIASTOLIC BLOOD PRESSURE: 70 MMHG | HEART RATE: 73 BPM | TEMPERATURE: 96.7 F

## 2024-05-10 VITALS
RESPIRATION RATE: 18 BRPM | HEART RATE: 70 BPM | TEMPERATURE: 97.5 F | HEIGHT: 64 IN | OXYGEN SATURATION: 91 % | SYSTOLIC BLOOD PRESSURE: 145 MMHG | BODY MASS INDEX: 42.68 KG/M2 | WEIGHT: 250 LBS | DIASTOLIC BLOOD PRESSURE: 73 MMHG

## 2024-05-10 VITALS — SYSTOLIC BLOOD PRESSURE: 130 MMHG | HEART RATE: 69 BPM | DIASTOLIC BLOOD PRESSURE: 82 MMHG

## 2024-05-10 DIAGNOSIS — R05.3 CHRONIC COUGH: Primary | ICD-10-CM

## 2024-05-10 LAB
ALBUMIN SERPL BCP-MCNC: 4.8 G/DL (ref 3.4–5)
ALP SERPL-CCNC: 60 U/L (ref 33–136)
ALT SERPL W P-5'-P-CCNC: 41 U/L (ref 7–45)
ANION GAP SERPL CALC-SCNC: 14 MMOL/L (ref 10–20)
AST SERPL W P-5'-P-CCNC: 34 U/L (ref 9–39)
BASOPHILS # BLD AUTO: 0.11 X10*3/UL (ref 0–0.1)
BASOPHILS NFR BLD AUTO: 1.3 %
BILIRUB SERPL-MCNC: 0.8 MG/DL (ref 0–1.2)
BNP SERPL-MCNC: 211 PG/ML (ref 0–99)
BUN SERPL-MCNC: 19 MG/DL (ref 6–23)
CALCIUM SERPL-MCNC: 10.1 MG/DL (ref 8.6–10.3)
CARDIAC TROPONIN I PNL SERPL HS: 7 NG/L (ref 0–13)
CARDIAC TROPONIN I PNL SERPL HS: 7 NG/L (ref 0–13)
CHLORIDE SERPL-SCNC: 100 MMOL/L (ref 98–107)
CO2 SERPL-SCNC: 27 MMOL/L (ref 21–32)
CREAT SERPL-MCNC: 0.95 MG/DL (ref 0.5–1.05)
EGFRCR SERPLBLD CKD-EPI 2021: 63 ML/MIN/1.73M*2
EOSINOPHIL # BLD AUTO: 0.31 X10*3/UL (ref 0–0.4)
EOSINOPHIL NFR BLD AUTO: 3.8 %
ERYTHROCYTE [DISTWIDTH] IN BLOOD BY AUTOMATED COUNT: 14.7 % (ref 11.5–14.5)
GLUCOSE SERPL-MCNC: 110 MG/DL (ref 74–99)
HCT VFR BLD AUTO: 48.2 % (ref 36–46)
HGB BLD-MCNC: 15 G/DL (ref 12–16)
IMM GRANULOCYTES # BLD AUTO: 0.03 X10*3/UL (ref 0–0.5)
IMM GRANULOCYTES NFR BLD AUTO: 0.4 % (ref 0–0.9)
LYMPHOCYTES # BLD AUTO: 2.02 X10*3/UL (ref 0.8–3)
LYMPHOCYTES NFR BLD AUTO: 24.7 %
MAGNESIUM SERPL-MCNC: 2.1 MG/DL (ref 1.6–2.4)
MCH RBC QN AUTO: 27.4 PG (ref 26–34)
MCHC RBC AUTO-ENTMCNC: 31.1 G/DL (ref 32–36)
MCV RBC AUTO: 88 FL (ref 80–100)
MONOCYTES # BLD AUTO: 0.92 X10*3/UL (ref 0.05–0.8)
MONOCYTES NFR BLD AUTO: 11.2 %
NEUTROPHILS # BLD AUTO: 4.79 X10*3/UL (ref 1.6–5.5)
NEUTROPHILS NFR BLD AUTO: 58.6 %
NRBC BLD-RTO: 0 /100 WBCS (ref 0–0)
PLATELET # BLD AUTO: 324 X10*3/UL (ref 150–450)
POTASSIUM SERPL-SCNC: 3.7 MMOL/L (ref 3.5–5.3)
PROT SERPL-MCNC: 8 G/DL (ref 6.4–8.2)
RBC # BLD AUTO: 5.48 X10*6/UL (ref 4–5.2)
SODIUM SERPL-SCNC: 137 MMOL/L (ref 136–145)
WBC # BLD AUTO: 8.2 X10*3/UL (ref 4.4–11.3)

## 2024-05-10 PROCEDURE — 71045 X-RAY EXAM CHEST 1 VIEW: CPT

## 2024-05-10 PROCEDURE — 84484 ASSAY OF TROPONIN QUANT: CPT | Performed by: PHYSICIAN ASSISTANT

## 2024-05-10 PROCEDURE — 85025 COMPLETE CBC W/AUTO DIFF WBC: CPT | Performed by: PHYSICIAN ASSISTANT

## 2024-05-10 PROCEDURE — 99284 EMERGENCY DEPT VISIT MOD MDM: CPT | Mod: 25

## 2024-05-10 PROCEDURE — 36415 COLL VENOUS BLD VENIPUNCTURE: CPT | Performed by: PHYSICIAN ASSISTANT

## 2024-05-10 PROCEDURE — G0299 HHS/HOSPICE OF RN EA 15 MIN: HCPCS

## 2024-05-10 PROCEDURE — 83880 ASSAY OF NATRIURETIC PEPTIDE: CPT | Performed by: PHYSICIAN ASSISTANT

## 2024-05-10 PROCEDURE — 93005 ELECTROCARDIOGRAM TRACING: CPT

## 2024-05-10 PROCEDURE — 71045 X-RAY EXAM CHEST 1 VIEW: CPT | Performed by: STUDENT IN AN ORGANIZED HEALTH CARE EDUCATION/TRAINING PROGRAM

## 2024-05-10 PROCEDURE — 2500000004 HC RX 250 GENERAL PHARMACY W/ HCPCS (ALT 636 FOR OP/ED): Performed by: PHYSICIAN ASSISTANT

## 2024-05-10 PROCEDURE — 83735 ASSAY OF MAGNESIUM: CPT | Performed by: PHYSICIAN ASSISTANT

## 2024-05-10 PROCEDURE — 84075 ASSAY ALKALINE PHOSPHATASE: CPT | Performed by: PHYSICIAN ASSISTANT

## 2024-05-10 PROCEDURE — 96374 THER/PROPH/DIAG INJ IV PUSH: CPT

## 2024-05-10 RX ORDER — FUROSEMIDE 10 MG/ML
40 INJECTION INTRAMUSCULAR; INTRAVENOUS ONCE
Status: COMPLETED | OUTPATIENT
Start: 2024-05-10 | End: 2024-05-10

## 2024-05-10 RX ADMIN — FUROSEMIDE 40 MG: 10 INJECTION, SOLUTION INTRAMUSCULAR; INTRAVENOUS at 21:41

## 2024-05-10 SDOH — ECONOMIC STABILITY: GENERAL

## 2024-05-10 SDOH — ECONOMIC STABILITY: FOOD INSECURITY: MEALS PER DAY: 3

## 2024-05-10 ASSESSMENT — PAIN SCALES - PAIN ASSESSMENT IN ADVANCED DEMENTIA (PAINAD)
TOTALSCORE: 0
FACIALEXPRESSION: 0 - SMILING OR INEXPRESSIVE.
BODYLANGUAGE: 0 - RELAXED.
CONSOLABILITY: 0
BODYLANGUAGE: 0
CONSOLABILITY: 0 - NO NEED TO CONSOLE.
FACIALEXPRESSION: 0
BREATHING: 0
NEGVOCALIZATION: 0
NEGVOCALIZATION: 0 - NONE.

## 2024-05-10 ASSESSMENT — ENCOUNTER SYMPTOMS
MUSCLE WEAKNESS: 1
PAIN LOCATION: RIGHT FOOT
DESCRIPTION OF MEMORY LOSS: SHORT TERM
PAIN LOCATION: LEFT FOOT
PAIN LOCATION - RELIEVING FACTORS: REST/MEDICATION
PAIN LOCATION - PAIN QUALITY: ACHING
PAIN LOCATION - PAIN FREQUENCY: CONSTANT
CHANGE IN APPETITE: DECREASED
PERSON REPORTING PAIN: PATIENT
BOWEL PATTERN NORMAL: 1
HIGHEST PAIN SEVERITY IN PAST 24 HOURS: 8/10
APPETITE LEVEL: POOR
STOOL FREQUENCY: LESS THAN DAILY
PAIN LOCATION - PAIN SEVERITY: 6/10
PAIN LOCATION - PAIN SEVERITY: 6/10
HYPERTENSION: 1
OCCASIONAL FEELINGS OF UNSTEADINESS: 1
PAIN: 1
PAIN LOCATION - PAIN FREQUENCY: CONSTANT
PAIN LOCATION - PAIN DURATION: CONSTANT
PAIN LOCATION - RELIEVING FACTORS: REST/MEDICATION
SUBJECTIVE PAIN PROGRESSION: WAXING AND WANING
PAIN LOCATION - PAIN DURATION: CONSTANT
FATIGUES EASILY: 1
DEPRESSION: 0
LOWEST PAIN SEVERITY IN PAST 24 HOURS: 5/10
PAIN LOCATION - PAIN QUALITY: ACHING
LAST BOWEL MOVEMENT: 66969
LOSS OF SENSATION IN FEET: 1
PAIN SEVERITY GOAL: 2/10

## 2024-05-10 ASSESSMENT — ACTIVITIES OF DAILY LIVING (ADL): MONEY MANAGEMENT (EXPENSES/BILLS): NEEDS ASSISTANCE

## 2024-05-10 ASSESSMENT — LIFESTYLE VARIABLES
EVER FELT BAD OR GUILTY ABOUT YOUR DRINKING: NO
HAVE PEOPLE ANNOYED YOU BY CRITICIZING YOUR DRINKING: NO
HAVE YOU EVER FELT YOU SHOULD CUT DOWN ON YOUR DRINKING: NO
TOTAL SCORE: 0
EVER HAD A DRINK FIRST THING IN THE MORNING TO STEADY YOUR NERVES TO GET RID OF A HANGOVER: NO

## 2024-05-10 ASSESSMENT — PAIN - FUNCTIONAL ASSESSMENT: PAIN_FUNCTIONAL_ASSESSMENT: 0-10

## 2024-05-10 ASSESSMENT — PAIN SCALES - GENERAL
PAINLEVEL_OUTOF10: 0 - NO PAIN
PAINLEVEL_OUTOF10: 0 - NO PAIN

## 2024-05-10 ASSESSMENT — COLUMBIA-SUICIDE SEVERITY RATING SCALE - C-SSRS
1. IN THE PAST MONTH, HAVE YOU WISHED YOU WERE DEAD OR WISHED YOU COULD GO TO SLEEP AND NOT WAKE UP?: NO
2. HAVE YOU ACTUALLY HAD ANY THOUGHTS OF KILLING YOURSELF?: NO
6. HAVE YOU EVER DONE ANYTHING, STARTED TO DO ANYTHING, OR PREPARED TO DO ANYTHING TO END YOUR LIFE?: NO

## 2024-05-10 NOTE — HOME HEALTH
patient alert and oriented x4. pt is very weak, fatigued, decreased appetite. Pulse ox is 94-95% RA. Lungs sounds in right middle and upper lobe are wheezing and rhonchi unable to clear with deep coughing. Nurse called Dr Mckinley office and Jennifer Leon office, was able to get through to Dr Mckinley office LM with PCP because Dr. Mckinley office requested this. Medication compliant. Dr. Mendoza called back and states to report to the Emergency Room. Pt and spouse heading to [facility] at 1650.    Spoke with patient the following day. She was not admitted and had an xray and IV Lasix in the Emergency room d/t fluid on right side of chest which was resolved after treatment.
No

## 2024-05-10 NOTE — PROGRESS NOTES
Daily Call Note:     Received incoming call from the patient.  She states HC Nurse was out and listened to her lungs, and stated she sounds like she has fluid on them.  Pt called her Doc office and was advised to go to the ER.  Advised pt that in case she does get admitted we will have to disenroll her from the program, but she is more than welcome to re-enroll.  She states she will keep us posted.      Pt Education:   Barriers:   Topics for Daily Review:   Pt demonstrates clear understanding:     Daily Weight:  There were no vitals filed for this visit.   Last 3 Weights:  Wt Readings from Last 7 Encounters:   05/01/24 113 kg (250 lb)   04/25/24 115 kg (254 lb 6.6 oz)   04/25/24 113 kg (250 lb)   12/29/22 106 kg (233 lb)   11/29/22 106 kg (233 lb)   11/28/22 106 kg (233 lb)   09/30/22 104 kg (229 lb 4 oz)       Masimo Device:    Masimo Clinical Impression:     Virtual Visits--Scheduled (Most Recent Date at Top)  Follow up Appointments  Recent Visits  Date Type Provider Dept   04/25/24 Office Visit OUMAR Rodriguez [facility]   Showing recent visits within past 30 days and meeting all other requirements  Future Appointments  Date Type Provider Dept   05/29/24 Appointment OUMAR RodriguezmanAlbert [facility]   Showing future appointments within next 90 days and meeting all other requirements       Frequency of RN Calls & Virtual Visits per Team Agreement:     Medication issues Addressed (what was done):     Follow up appointments scheduled by [facility] Staff:   Referrals made by [facility] staff:

## 2024-05-10 NOTE — ED TRIAGE NOTES
"Patient has had minor SOB with a cough and congestion for the past few days. Patient was seen by home health today and they think she may \"have water on her right lung\". Patient was advised by PCP to come to the ED. Patient denies CP.   "

## 2024-05-10 NOTE — PROGRESS NOTES
Daily call completed patient is doing well today she had her [facility] appointment yesterday so no questions stated she ate to many saltines yesterday so he glucose was up a bit but this am her FBG is 130 /82 and HR 69 patient does no weigh herself she has homecare nurse visit today

## 2024-05-10 NOTE — PROGRESS NOTES
Brief summary of hospitalization or reason for referral  Admission Dx and Associated Referral Dx: Afib, DM    75 yo F admitted with new onset A fib with RVR. She was started on metoprolol and heparin drip. She did not convert to sinus rhythm on her own over x3 days and remained rate controlled. She denied symptoms once her rate was controlled. On 4/29 she underwent BETHEL with DCCV which was successful in converting her to normal sinus rhythm. She was started on eliquis, metoprolol, losartan. She was discharged to home in stable condition to follow up with pcp and cardiologist.     Interval Subjective: Spoke to the patient on the phone. Reported to feeling well, no chest pain/ SOB. Is not checking her weight as she reports getting upset about not losing it as she would like to. Taking all  her medications including lasix. No concern with Apixaban. States she had a significant allergic reaction to Jardiance and not Januvia, would like to hold off on taking it  until she speaks with her cardiologist. Bl sugars fairly controlled. patient reports she did well since discharge home, denies any SOB/chest pain. Still have some edema in the legs but stating lasix seems to be doing better with lasix. Has not been measuring her body weight. Discussed regarding Jardiance, but patient states she tried it in the past but caused her severe dehydration and does not want to be on it again. Later we discovered it was Januvia instead. Patient is willing to try Januvia.     Masimo: No  Oxygen: No     CPAP/BIPAP: No    Wt Readings from Last 3 Encounters:   05/01/24 113 kg (250 lb)   04/25/24 115 kg (254 lb 6.6 oz)   04/25/24 113 kg (250 lb)       Medications Issues/Refill need  Medication Follow up action needed: none    Requested Prescriptions      No prescriptions requested or ordered in this encounter       Upcoming Visits:  Recent Visits  Date Type Provider Dept   04/25/24 Office Visit PITA Rodriguez-JIE Dominguez  [facility]   Showing recent visits within past 30 days and meeting all other requirements  Today's Visits  Date Type Provider Dept   05/09/24 [facility]ment [facility]   05/09/24 Appointment [facility]   Showing today's visits and meeting all other requirements  Future Appointments  Date Type Provider Dept   05/16/24 Appointment [facility]   05/29/24 Appointment Jennifer Allen, APRN-CNP [facility]   Showing future appointments within next 90 days and meeting all other requirements      Interval or Pertinent Labs/Testing:  Lab Review:   Hemoglobin A1C   Date/Time Value Ref Range Status   06/05/2023 01:38 PM 8.1 (A) % Final     Comment:          Diagnosis of Diabetes-Adults   Non-Diabetic: < or = 5.6%   Increased risk for developing diabetes: 5.7-6.4%   Diagnostic of diabetes: > or = 6.5%  .       Monitoring of Diabetes                Age (y)     Therapeutic Goal (%)   Adults:          >18           <7.0   Pediatrics:    13-18           <7.5                   7-12           <8.0                   0- 6            7.5-8.5   American Diabetes Association. Diabetes Care 33(S1), Jan 2010.     01/27/2022 12:16 PM 11.9 (A) % Final     Comment:          Diagnosis of Diabetes-Adults   Non-Diabetic: < or = 5.6%   Increased risk for developing diabetes: 5.7-6.4%   Diagnostic of diabetes: > or = 6.5%  .       Monitoring of Diabetes                Age (y)     Therapeutic Goal (%)   Adults:          >18           <7.0   Pediatrics:    13-18           <7.5                   7-12           <8.0                   0- 6            7.5-8.5   American Diabetes Association. Diabetes Care 33(S1), Jan 2010.     10/07/2021 09:26 AM 7.9 (A) % Final     Comment:          Diagnosis of Diabetes-Adults   Non-Diabetic: < or = 5.6%   Increased risk for developing diabetes: 5.7-6.4%   Diagnostic of diabetes: > or = 6.5%  .       Monitoring of  Diabetes                Age (y)     Therapeutic Goal (%)   Adults:          >18           <7.0   Pediatrics:    13-18           <7.5                   7-12           <8.0                   0- 6            7.5-8.5   American Diabetes Association. Diabetes Care 33(S1), Jan 2010.         not applicable     SDOH Concerns & Interventions: none    Assessment/Plan  #Afib, s/p  BETHEL / DCCV in the hospital  -continue Eliquis / metoprolol  -cardiology follow up appointment on 5/22    #Acute on chronic systolic CHF  -EF 40-45% on 4/25  -continue losartan and lasix  - bp fairly controlled, would like to hold off on taking Jardiance given concern for ?side effects.    #DM, bl sugar fairly well controlled.  -continue Mounjaro  -Last A1c in Aug 2023 was 8.1%, needs repeat check.    (Only need to mention high risk or pertinent conditions relevant to the reason for referral)     Telephone conference completed with patient and RN.

## 2024-05-11 ENCOUNTER — PATIENT OUTREACH (OUTPATIENT)
Dept: HOME HEALTH SERVICES | Age: 74
End: 2024-05-11
Payer: MEDICARE

## 2024-05-11 NOTE — ED PROVIDER NOTES
HPI   Chief Complaint   Patient presents with    Shortness of Breath       Is a 74-year-old female coming in for abnormal lung sounds per home health care nurse.  Patient has had a chronic cough but reports no new symptoms.  She sees Dr. Rodriguez for cardiology for atrial fibrillation and is currently on Eliquis.  She denies any chest pain.  No fevers or chills.  She states she is felt nasally lately and congested however her cough has not changed.  She denies any vomiting or diarrhea.  She denies any weakness or dizziness.  Again, patient was sent in by the home health care nurse because her lungs sounded abnormal.      History provided by:  Patient and spouse                      [place] Coma Scale Score: 15                     Patient History   Past Medical History:   Diagnosis Date    Hyperlipidemia, unspecified 11/28/2022    Hyperlipidemia    Neuralgia and neuritis, unspecified 05/18/2022    Neuropathic pain of lower extremity, unspecified laterality    Type 2 diabetes mellitus without complications (Multi) 11/28/2022    Diabetes mellitus, type 2     History reviewed. No pertinent surgical history.  No family history on file.  Social History     Tobacco Use    Smoking status: Never    Smokeless tobacco: Never   Vaping Use    Vaping status: Never Used   Substance Use Topics    Alcohol use: Never    Drug use: Never       Physical Exam   ED Triage Vitals [05/10/24 1811]   Temperature Heart Rate Respirations BP   36.4 °C (97.5 °F) 73 18 147/59      Pulse Ox Temp Source Heart Rate Source Patient Position   96 % Temporal Monitor --      BP Location FiO2 (%)     -- --       Physical Exam  Vitals and nursing note reviewed.   Constitutional:       General: She is not in acute distress.     Appearance: Normal appearance. She is not toxic-appearing.   HENT:      Head: Normocephalic and atraumatic.      Nose: Nose normal.      Mouth/Throat:      Mouth: Mucous membranes are moist.      Pharynx: Oropharynx is clear.   Eyes:       Extraocular Movements: Extraocular movements intact.      Conjunctiva/sclera: Conjunctivae normal.      Pupils: Pupils are equal, round, and reactive to light.   Cardiovascular:      Rate and Rhythm: Normal rate and regular rhythm.      Pulses: Normal pulses.      Heart sounds: Normal heart sounds.   Pulmonary:      Effort: Pulmonary effort is normal. No respiratory distress.      Breath sounds: Normal breath sounds. No wheezing or rales.   Abdominal:      General: Abdomen is flat. Bowel sounds are normal.      Palpations: Abdomen is soft.      Tenderness: There is no abdominal tenderness.   Musculoskeletal:         General: Normal range of motion.      Cervical back: Normal range of motion and neck supple.      Right lower leg: No edema.      Left lower leg: No edema.   Skin:     General: Skin is warm and dry.      Coloration: Skin is not jaundiced or pale.      Findings: No bruising.   Neurological:      General: No focal deficit present.      Mental Status: She is alert and oriented to person, place, and time. Mental status is at baseline.   Psychiatric:         Mood and Affect: Mood normal.         Behavior: Behavior normal.         ED Course & MDM   Diagnoses as of 05/10/24 2131   Chronic cough       Medical Decision Making  Summary:  Medical Decision Making:   Patient presented as described in HPI. Patient case including ROS, PE, and treatment and plan discussed with ED attending if attached as cosigner. Results from labs and or imaging included below if completed. Lexis Denny  is a 74 y.o. coming in for Patient presents with:  Shortness of Breath  .  Patient's physical exam is nonconcerning and there are no abnormal lung sounds on my exam.  Due to the complaint cardiac workup was completed.  Troponin is negative x 2.  BNP is slightly elevated however improved compared to prior.  Chest x-ray shows no concerning abnormalities.  Patient did not take her Lasix tonight so was given a dose of IV Lasix.   She is advised of need for follow-up with cardiology as well as PCP for chronic cough but I do not believe that she needs hospitalized tonight.  Advised continue follow-up with primary care provider as well as cardiology and continue seeing home health care.      Disposition is completed with shared decision making with the patient or guardian present with the patient. They were advised to follow up with PCP or recommended provider in 2-3 days for another evaluation and exam. I advised the patient to return or go to closest emergency room immediately if symptoms change, get worse, or new symptoms develop prior to follow up. I explained the plan and treatment course. Patient/guardian is in agreement with plan, treatment course, and follow up and state that they will comply.    Labs Reviewed  CBC WITH AUTO DIFFERENTIAL - Abnormal     WBC                           8.2                    nRBC                          0.0                    RBC                           5.48 (*)               Hemoglobin                    15.0                   Hematocrit                    48.2 (*)               MCV                           88                     MCH                           27.4                   MCHC                          31.1 (*)               RDW                           14.7 (*)               Platelets                     324                    Neutrophils %                 58.6                   Immature Granulocytes %, Automated   0.4                    Lymphocytes %                 24.7                   Monocytes %                   11.2                   Eosinophils %                 3.8                    Basophils %                   1.3                    Neutrophils Absolute          4.79                   Immature Granulocytes Absolute, Au*   0.03                   Lymphocytes Absolute          2.02                   Monocytes Absolute            0.92 (*)               Eosinophils Absolute           0.31                   Basophils Absolute            0.11 (*)            COMPREHENSIVE METABOLIC PANEL - Abnormal     Glucose                       110 (*)                Sodium                        137                    Potassium                     3.7                    Chloride                      100                    Bicarbonate                   27                     Anion Gap                     14                     Urea Nitrogen                 19                     Creatinine                    0.95                   eGFR                          63                     Calcium                       10.1                   Albumin                       4.8                    Alkaline Phosphatase          60                     Total Protein                 8.0                    AST                           34                     Bilirubin, Total              0.8                    ALT                           41                  B-TYPE NATRIURETIC PEPTIDE - Abnormal     BNP                           211 (*)                  Narrative:    <100 pg/mL - Heart failure unlikely                100-299 pg/mL - Intermediate probability of acute heart                                failure exacerbation. Correlate with clinical                                context and patient history.                  >=300 pg/mL - Heart Failure likely. Correlate with clinical                                context and patient history.                                BNP testing is performed using different testing methodology at [facility] than at other [facility] hospitals. Direct result comparisons should only be made within the same method.                   MAGNESIUM - Normal     Magnesium                     2.10                SERIAL TROPONIN-INITIAL - Normal     Troponin I, High Sensitivity   7                        Narrative: Less than 99th percentile of normal range cutoff-                Female and  children under 18 years old <14 ng/L; Male <21 ng/L: Negative                Repeat testing should be performed if clinically indicated.                                 Female and children under 18 years old 14-50 ng/L; Male 21-50 ng/L:                Consistent with possible cardiac damage and possible increased clinical                 risk. Serial measurements may help to assess extent of myocardial damage.                                 >50 ng/L: Consistent with cardiac damage, increased clinical risk and                myocardial infarction. Serial measurements may help assess extent of                 myocardial damage.                                  NOTE: Children less than 1 year old may have higher baseline troponin                 levels and results should be interpreted in conjunction with the overall                 clinical context.                                 NOTE: Troponin I testing is performed using a different                 testing methodology at [facility] than at other                 [facility]. Direct result comparisons should only                 be made within the same method.  SERIAL TROPONIN, 1 HOUR - Normal     Troponin I, High Sensitivity   7                        Narrative: Less than 99th percentile of normal range cutoff-                Female and children under 18 years old <14 ng/L; Male <21 ng/L: Negative                Repeat testing should be performed if clinically indicated.                                 Female and children under 18 years old 14-50 ng/L; Male 21-50 ng/L:                Consistent with possible cardiac damage and possible increased clinical                 risk. Serial measurements may help to assess extent of myocardial damage.                                 >50 ng/L: Consistent with cardiac damage, increased clinical risk and                myocardial infarction. Serial measurements may help assess extent of                  myocardial damage.                                  NOTE: Children less than 1 year old may have higher baseline troponin                 levels and results should be interpreted in conjunction with the overall                 clinical context.                                 NOTE: Troponin I testing is performed using a different                 testing methodology at [facility] than at other                 [facility] hospitals. Direct result comparisons should only                 be made within the same method.  TROPONIN SERIES- (INITIAL, 1 HR)   XR chest 1 view   Final Result    Resolved bilateral pleural effusions without acute abnormality.          MACRO    None          Signed by: Malik Machado 5/10/2024 7:47 PM    Dictation workstation:   [id number]                            Tests/Medications/Escalations of Care considered but not given: As in MDM    Patient care discussed with: N/A  Social Determinants affecting care: N/A    Final diagnosis and disposition as documented     Diagnoses as of 05/10/24 2140  Chronic cough       Shared decision making was completed and determined that patient will be discharged. I discussed the differential; results and discharge plan with the patient and/or family/friend/caregiver if present.  I emphasized the importance of follow-up with the physician I referred them to in the timeframe recommended.  I explained reasons for the patient to return to the Emergency Department. They agreed that if they feel their condition is worsening or if they have any other concern they should call 911 immediately for further assistance. I gave the patient an opportunity to ask all questions they had and answered all of them accordingly. They understand return precautions and discharge instructions. The patient and/or family/friend/caregiver expressed understanding verbally and that they would comply.     Disposition: Discharge      This note has been transcribed using voice  recognition and may contain grammatical errors, misplaced words, incorrect words, incorrect phrases or other errors.         Procedure  Procedures     Carl Flannery PA-C  05/10/24 [id number]

## 2024-05-11 NOTE — PROGRESS NOTES
Daily call completed patient is doing well she was seen in the ER yesterday as advised she had Chest xray and labs she was given an IV dose of lasix and discharged she states her breathing and cough is better today he vitals /84 HR 59 she did not weigh herself today  she is trying to be more active she is going to try getting up and moving more frequently during the day no medication needs questions and concerns addressed patient aware next call is Monday

## 2024-05-13 ENCOUNTER — PATIENT OUTREACH (OUTPATIENT)
Dept: HOME HEALTH SERVICES | Age: 74
End: 2024-05-13
Payer: MEDICARE

## 2024-05-13 VITALS — DIASTOLIC BLOOD PRESSURE: 65 MMHG | OXYGEN SATURATION: 98 % | HEART RATE: 68 BPM | SYSTOLIC BLOOD PRESSURE: 102 MMHG

## 2024-05-13 NOTE — PROGRESS NOTES
Daily call completed patient is doing well still a little horse but cough is better her vitals today 102/65 HR 68 Pox 98  edema is better patient doesn't like to weigh herself will see if we can get a weight tomorrow patient has been using her walker a bit more due to feeling a little weaker will follow up  she has home care RN but no PT OT will see if maybe she would benefit no medications needs questions and concerns addressed upcoming [facility] reviewed

## 2024-05-14 ENCOUNTER — PATIENT OUTREACH (OUTPATIENT)
Dept: HOME HEALTH SERVICES | Age: 74
End: 2024-05-14

## 2024-05-14 VITALS — HEART RATE: 62 BPM | SYSTOLIC BLOOD PRESSURE: 102 MMHG | DIASTOLIC BLOOD PRESSURE: 65 MMHG | OXYGEN SATURATION: 98 %

## 2024-05-14 PROCEDURE — G0180 MD CERTIFICATION HHA PATIENT: HCPCS | Performed by: NURSE PRACTITIONER

## 2024-05-14 NOTE — PROGRESS NOTES
Patient called in she is feeling good vitals 102/65 HR 62 POX 98  no medications needs questions and concerns addressed

## 2024-05-15 ENCOUNTER — PATIENT OUTREACH (OUTPATIENT)
Dept: HOME HEALTH SERVICES | Age: 74
End: 2024-05-15
Payer: MEDICARE

## 2024-05-15 VITALS — DIASTOLIC BLOOD PRESSURE: 77 MMHG | HEART RATE: 73 BPM | SYSTOLIC BLOOD PRESSURE: 101 MMHG

## 2024-05-15 NOTE — PROGRESS NOTES
Pt called in for daily call and provided VS:  At 0210: 108/73, 76, 98%, BGS 98  At 0430: 117/72, 73, BGS 98  Early mornin/69, 73, Pulse ox 98   Mid mornin/77, 75, no blood sugar taken    Pt is doing all her exercises and is very optimistic. She is aware of HHVC tomorrow at 1100          Patient's Address:   [address] [place] [id number]  **  If this is not the address patient will receive services - alert team and address in EMR**       Patient Contacts:  Extended Emergency Contact Information  Primary Emergency Contact: Subhash Nicholson  Address: [address] [place] [address] [facility]  Home Phone: [phone]  Mobile Phone: [phone]  Relation: Spouse  Secondary Emergency Contact: Francesca Nicholson  Address: [address] [place] [address] [facility]  Home Phone: [phone]  Mobile Phone: [phone]  Relation: Daughter                                Patient's Preferred Phone: [phone]  Patient's E-mail: [email or web address]

## 2024-05-16 ENCOUNTER — PATIENT OUTREACH (OUTPATIENT)
Dept: HOME HEALTH SERVICES | Age: 74
End: 2024-05-16

## 2024-05-16 ENCOUNTER — TELEMEDICINE (OUTPATIENT)
Dept: PHARMACY | Facility: HOSPITAL | Age: 74
End: 2024-05-16
Payer: MEDICARE

## 2024-05-16 VITALS — HEART RATE: 79 BPM | DIASTOLIC BLOOD PRESSURE: 73 MMHG | OXYGEN SATURATION: 98 % | SYSTOLIC BLOOD PRESSURE: 123 MMHG

## 2024-05-16 DIAGNOSIS — E11.9 TYPE 2 DIABETES MELLITUS WITHOUT COMPLICATION, UNSPECIFIED WHETHER LONG TERM INSULIN USE (MULTI): ICD-10-CM

## 2024-05-16 DIAGNOSIS — I48.91 ATRIAL FIBRILLATION WITH RVR (MULTI): Primary | ICD-10-CM

## 2024-05-16 DIAGNOSIS — I50.9 CONGESTIVE HEART FAILURE, UNSPECIFIED HF CHRONICITY, UNSPECIFIED HEART FAILURE TYPE (MULTI): ICD-10-CM

## 2024-05-16 NOTE — PROGRESS NOTES
"Pharmacy Post-Discharge Visit - Follow Up     Lexis Denny \"Alycia\" is a 74 y.o. female was referred to Clinical Pharmacy Team to complete a post-discharge medication optimization and monitoring visit.  The patient was referred for their Afib, CHF and diabetes management while also enrolled in [facility].       Referring Provider: Titi Verma MD  PCP: Jennifer Allen, PITA-CNP - next visit: 5/29      Subjective   Allergies   Allergen Reactions    Guaifenesin-Dm Cardiac arrhythmia/arrest and Anxiety    Aspirin Other    Clarithromycin Other    Metformin GI Upset    Rosuvastatin Unknown       [id number] - [place], [place] - [address] [facility]  [address] [facility] [address]  Phone: [phone] Fax: [phone]    [facility]  [address], [address]  [facility] [address]  Phone: [phone] Fax: [phone]    [id number] - [place], [place] - [address] [address]  [address] [facility] [address]  Phone: [phone] Fax: [phone]    [facility]  [address] [facility] [address]  Phone: [phone] Fax: [phone]      Medication System Management:  Affordability/Accessibility: approved for PAP  Adherence/Organization: no concerns       Social History     Social History Narrative    Not on file          Diabetes  She presents for her follow-up diabetic visit. She has type 2 diabetes mellitus. Her disease course has been improving. There are no hypoglycemic associated symptoms. There are no hypoglycemic complications. Diabetic complications include heart disease and peripheral neuropathy. Risk factors for coronary artery disease include diabetes mellitus and obesity. Current diabetic treatment includes insulin injections and oral agent (monotherapy). She is compliant with treatment all of the time. She is following a diabetic diet. An ACE inhibitor/angiotensin II receptor blocker is being taken.     CHF ASSESSMENT  Staging:  Most recent " ejection fraction: 40%  NYHA Stage: III  ACC/AHA Stage: C     Symptom Assessment:  Weight changes/edema?: No  Dyspnea?: With exertion  Dizziness/syncope/palpitations?: No     Current Regimen:  ARNI/ACEi/ARB: Yes - losartan  Beta Blocker: Yes - metoprolol  MRA: No  SGLT2i: No     Other therapy:  Eliquis   Lasix      Secondary Prevention:  The 10-year ASCVD risk score (Veronica CARABALLO, et al., 2019) is: 26.3%    Values used to calculate the score:      Age: 74 years      Sex: Female      Is Non- : No      Diabetic: Yes      Tobacco smoker: No      Systolic Blood Pressure: 110 mmHg      Is BP treated: Yes      HDL Cholesterol: 31.3 mg/dL      Total Cholesterol: 200 mg/dL     Aspirin 81mg? no  Statin?: No  HTN?: No     Review of Systems        Objective     There were no vitals taken for this visit.   BP Readings from Last 4 Encounters:   05/15/24 101/77   05/14/24 102/65   05/13/24 102/65   05/10/24 145/73      There were no vitals filed for this visit.     LAB  Lab Results   Component Value Date    BILITOT 0.8 05/10/2024    CALCIUM 10.1 05/10/2024    CO2 27 05/10/2024     05/10/2024    CREATININE 0.95 05/10/2024    GLUCOSE 110 (H) 05/10/2024    ALKPHOS 60 05/10/2024    K 3.7 05/10/2024    PROT 8.0 05/10/2024     05/10/2024    AST 34 05/10/2024    ALT 41 05/10/2024    BUN 19 05/10/2024    ANIONGAP 14 05/10/2024    MG 2.10 05/10/2024    PHOS 3.3 04/25/2024    ALBUMIN 4.8 05/10/2024    GFRF 80 06/05/2023     Lab Results   Component Value Date    TRIG 177 (H) 12/16/2022    CHOL 200 (H) 12/16/2022    HDL 31.3 (A) 12/16/2022     Lab Results   Component Value Date    HGBA1C 8.1 (A) 06/05/2023         Current Outpatient Medications   Medication Instructions    cholecalciferol (Vitamin D-3) 50 MCG (2000 UT) tablet 1 tablet, oral, Every morning    Eliquis 5 mg, oral, 2 times daily    flash glucose sensor kit (FreeStyle Gabbi 2 Sensor) kit Change the sensor every 14 days    fluticasone (Flonase  "Allergy Relief) 50 mcg/actuation nasal spray 2 sprays, Each Nostril, Daily    furosemide (LASIX) 40 mg, oral, Daily    Jardiance 10 mg, oral, Daily    loratadine 10 mg capsule oral    losartan (COZAAR) 25 mg, oral, Daily    metoprolol tartrate (LOPRESSOR) 50 mg, oral, 2 times daily    Mounjaro 5 mg, subcutaneous, Once Weekly    Mounjaro 2.5 mg, subcutaneous, Once Weekly    multivit-min-folic acid-biotin (Women's Multivitamin w-Biotin) 200-300 mcg tablet,chewable 1 tablet, oral, Daily    psyllium (Metamucil) 3.4 gram packet 1 packet, oral, Every morning            Assessment/Plan   Problem List Items Addressed This Visit    None    DM   Most recent A1c was 8.1% (6/5/23)  Fasting today 126  She wears CGM and is supposed to be on mounjaro 5mg once weekly but due to back order had to go back to 2.5mg weekly for now  But being managed by other clinical pharmacist   Isamarib   Has felt really good since being home   No feelings of palpitations and not having any chest pain  Continue metoprolol and eliquis   Eliquis has been added to PAP --> was shipped on 5/2  CHF   Continue GDMT --> metoprolol and losartan   Jardiance also added to PAP --> she is hesitant to start though because believes she was on in the past and made her \"dehydrated\" but do not see any fill history (see that she was on januvia in the past) - would be beneficial with her DM too  Also reached out to Dr. Rodriguez about entresto and he would like her to get repeat ECHO first   Continue lasix daily   Continue with daily weights and BP's     Follow Up: 1 week     Continue all meds under the continuation of care with the referring provider and clinical pharmacy team.    Tushar Caceres, [place]     Verbal consent to manage patient's drug therapy was obtained from the patient. They were informed they may decline to participate or withdraw from participation in pharmacy services at any time.   "

## 2024-05-16 NOTE — PROGRESS NOTES
Daily call completed with with Dr Verma and Tushar from pharmacy patient reviewed recent visit to the ER patient had gotten some IV lasix and she improved she has been timing her meds so that she is not up all night patients couch has improved since she has changed her bed to being reclined less patient has been changing her diet patterns and has noticed an improvement in her glucose numbers discussed the jardience she had an issue with she wants to talk with her cardiologist before taking patient has a CGM an she  feels like it is much better she is on munjaro has not had significant weight loss and due to supply her dose is being reduced no other medication needs questions and concerns answered frequency changed to t/th/sat next [facility] appointment scheduled for 5/23 @ 1100

## 2024-05-17 ENCOUNTER — HOME CARE VISIT (OUTPATIENT)
Dept: HOME HEALTH SERVICES | Facility: HOME HEALTH | Age: 74
End: 2024-05-17
Payer: MEDICARE

## 2024-05-17 VITALS
DIASTOLIC BLOOD PRESSURE: 68 MMHG | RESPIRATION RATE: 18 BRPM | TEMPERATURE: 97.5 F | SYSTOLIC BLOOD PRESSURE: 118 MMHG | HEART RATE: 72 BPM | OXYGEN SATURATION: 96 %

## 2024-05-17 LAB
ATRIAL RATE: 75 BPM
P AXIS: 15 DEGREES
P OFFSET: 213 MS
P ONSET: 150 MS
PR INTERVAL: 138 MS
Q ONSET: 219 MS
QRS COUNT: 12 BEATS
QRS DURATION: 86 MS
QT INTERVAL: 420 MS
QTC CALCULATION(BAZETT): 469 MS
QTC FREDERICIA: 452 MS
R AXIS: 36 DEGREES
T AXIS: 33 DEGREES
T OFFSET: 429 MS
VENTRICULAR RATE: 75 BPM

## 2024-05-17 PROCEDURE — G0299 HHS/HOSPICE OF RN EA 15 MIN: HCPCS

## 2024-05-17 SDOH — ECONOMIC STABILITY: GENERAL

## 2024-05-17 SDOH — ECONOMIC STABILITY: FOOD INSECURITY: MEALS PER DAY: 3

## 2024-05-17 SDOH — ECONOMIC STABILITY: FOOD INSECURITY: SNACKS PER DAY: 1

## 2024-05-17 ASSESSMENT — ENCOUNTER SYMPTOMS
PAIN LOCATION - RELIEVING FACTORS: REST/MEDS
ARTHRALGIAS: 1
PAIN LOCATION - PAIN QUALITY: ACHING
PAIN LOCATION: LEFT FOOT
PAIN SEVERITY GOAL: 3/10
SUBJECTIVE PAIN PROGRESSION: WAXING AND WANING
LOWEST PAIN SEVERITY IN PAST 24 HOURS: 6/10
PAIN LOCATION - EXACERBATING FACTORS: ACTIVITY
DESCRIPTION OF MEMORY LOSS: SHORT TERM
PAIN LOCATION - EXACERBATING FACTORS: ACTIVITY
CHANGE IN APPETITE: DECREASED
OCCASIONAL FEELINGS OF UNSTEADINESS: 1
PAIN LOCATION - PAIN FREQUENCY: CONSTANT
FATIGUES EASILY: 1
PAIN LOCATION - PAIN DURATION: WAX AND WANE
PAIN LOCATION - PAIN QUALITY: ACHING
LAST BOWEL MOVEMENT: 66977
PAIN LOCATION - PAIN SEVERITY: 6/10
PAIN LOCATION - PAIN DURATION: WAX AND WANE
STOOL FREQUENCY: DAILY
CONSTIPATION: 1
PERSON REPORTING PAIN: PATIENT
PAIN: 1
HIGHEST PAIN SEVERITY IN PAST 24 HOURS: 8/10
PAIN LOCATION - PAIN FREQUENCY: CONSTANT
PAIN LOCATION - PAIN DURATION: WAX AND WANE
PAIN LOCATION - RELIEVING FACTORS: REST/MEDS
PAIN LOCATION - PAIN FREQUENCY: CONSTANT
PAIN LOCATION - RELIEVING FACTORS: REST/MEDS
PAIN LOCATION - PAIN DURATION: WAX AND WANE
PAIN LOCATION - PAIN QUALITY: ACHING
DEPRESSION: 0
PAIN LOCATION: RIGHT KNEE
PAIN LOCATION - PAIN SEVERITY: 6/10
PAIN LOCATION: RIGHT FOOT
PAIN LOCATION - PAIN SEVERITY: 6/10
APPETITE LEVEL: FAIR
PAIN LOCATION - EXACERBATING FACTORS: ACTIVITY
PAIN LOCATION - PAIN QUALITY: ACHING
PAIN LOCATION - PAIN FREQUENCY: CONSTANT
PAIN LOCATION: LEFT KNEE
PAIN LOCATION - RELIEVING FACTORS: REST/MEDS
PAIN LOCATION - PAIN SEVERITY: 6/10
LOSS OF SENSATION IN FEET: 1
PAIN LOCATION - EXACERBATING FACTORS: ACTING

## 2024-05-17 ASSESSMENT — PAIN SCALES - PAIN ASSESSMENT IN ADVANCED DEMENTIA (PAINAD)
BODYLANGUAGE: 0
FACIALEXPRESSION: 0 - SMILING OR INEXPRESSIVE.
NEGVOCALIZATION: 0 - NONE.
TOTALSCORE: 0
NEGVOCALIZATION: 0
CONSOLABILITY: 0
CONSOLABILITY: 0 - NO NEED TO CONSOLE.
FACIALEXPRESSION: 0
BODYLANGUAGE: 0 - RELAXED.
BREATHING: 0

## 2024-05-17 NOTE — HOME HEALTH
patient alert and oriented x4. In good spirits. Encouraged to start daily weights to prevent rehospitalization of fluid overload and CHF. Dr. Mendoza is calling to check on patient. Doing very well. Does not need to go to ED. LCTA. Seeing Dr. Rodriguez on 5/22/2024.

## 2024-05-18 ENCOUNTER — PATIENT OUTREACH (OUTPATIENT)
Dept: CARE COORDINATION | Age: 74
End: 2024-05-18
Payer: MEDICARE

## 2024-05-18 VITALS — HEART RATE: 70 BPM | DIASTOLIC BLOOD PRESSURE: 71 MMHG | OXYGEN SATURATION: 97 % | SYSTOLIC BLOOD PRESSURE: 125 MMHG

## 2024-05-18 NOTE — PROGRESS NOTES
Daily Call Note: Spoke with patient today via phone. She is doing well with managing medications and glucose. Patient's blood sugar today was 122, patient did not weigh her self today. Vitals per patient 125/76, HR 70 and pulse ox 97% room air. Patient states she has to cut Munjaro in half due to a lower supply of the medication at . We also discussed diet and BS management. Patient states she does not want to go back on Jardiance due to medication causing dehydration and she did not feel well while on Jardiance. Spouse assist with placing patient's medications in pill boxes. Patient was reminded of next nursing call scheduled for Tuesday.     Pt Education: BS management, diet   Barriers: n/a   Topics for Daily Review: medications   Pt demonstrates clear understanding: Yes    Daily Weight:  There were no vitals filed for this visit.   Last 3 Weights:  Wt Readings from Last 7 Encounters:   05/10/24 113 kg (250 lb)   05/01/24 113 kg (250 lb)   04/25/24 115 kg (254 lb 6.6 oz)   04/25/24 113 kg (250 lb)   12/29/22 106 kg (233 lb)   11/29/22 106 kg (233 lb)   11/28/22 106 kg (233 lb)       Masimo Device: No   Masimo Clinical Impression: n/a     Virtual Visits--Scheduled (Most Recent Date at Top)  Follow up Appointments  Recent Visits  Date Type Provider Dept   05/09/24 Telemedicine Divine Chapin MD MPH [facility]   04/25/24 Office Visit OUMAR Rodriguez [place] [address]   Showing recent visits within past 30 days and meeting all other requirements  Future Appointments  Date Type Provider Dept   05/29/24 Appointment OUM[facility] Rodriguez [facility]   Showing future appointments within next 90 days and meeting all other requirements       Frequency of RN Calls & Virtual Visits per Team Agreement: [facility] Frequency: T, Th, Sat    Medication issues Addressed (what was done): no issues     Follow up appointments scheduled by [facility] Staff: n/a   Referrals made by [facility] staff:  Refill levocetirizine 5mg and amlodipine besylate 10mg    n/a

## 2024-05-20 ENCOUNTER — PATIENT OUTREACH (OUTPATIENT)
Dept: HOME HEALTH SERVICES | Age: 74
End: 2024-05-20
Payer: MEDICARE

## 2024-05-20 VITALS — SYSTOLIC BLOOD PRESSURE: 118 MMHG | HEART RATE: 72 BPM | DIASTOLIC BLOOD PRESSURE: 73 MMHG | OXYGEN SATURATION: 97 %

## 2024-05-20 NOTE — PROGRESS NOTES
Incoming call from patient to report VS:    5/19 /79  HR  92  SpO2 98%    5/20 /73   Pulse 72   SpO2 97%

## 2024-05-21 ENCOUNTER — PATIENT OUTREACH (OUTPATIENT)
Dept: HOME HEALTH SERVICES | Age: 74
End: 2024-05-21
Payer: MEDICARE

## 2024-05-21 VITALS — OXYGEN SATURATION: 98 % | HEART RATE: 75 BPM | SYSTOLIC BLOOD PRESSURE: 113 MMHG | DIASTOLIC BLOOD PRESSURE: 75 MMHG

## 2024-05-21 NOTE — PROGRESS NOTES
Daily Call Note:   Spoke to patient, she reports feeling fine. She states she will have to stay inside today due to the heat.   She reports vitals and BG readings.   HC following weekly.  She speaks of support system, daughter and health care team, thankful for care.  Reviewed next call.   Pt Education: POC   Barriers: na   Topics for Daily Review: POC  Pt demonstrates clear understanding: Yes    Daily Weight:  There were no vitals filed for this visit.   Last 3 Weights:  Wt Readings from Last 7 Encounters:   05/10/24 113 kg (250 lb)   05/01/24 113 kg (250 lb)   04/25/24 115 kg (254 lb 6.6 oz)   04/25/24 113 kg (250 lb)   12/29/22 106 kg (233 lb)   11/29/22 106 kg (233 lb)   11/28/22 106 kg (233 lb)       Masimo Device: No   Masimo Clinical Impression: na    Virtual Visits--Scheduled (Most Recent Date at Top)  Follow up Appointments  Recent Visits  Date Type Provider Dept   04/25/24 Office Visit [facility] [place] [facility]   Showing recent visits within past 30 days and meeting all other requirements  Future Appointments  Date Type Provider Dept   05/29/24 Appointment OUMAR Rodriguez [facility]   Showing future appointments within next 90 days and meeting all other requirements       Frequency of RN Calls & Virtual Visits per Team Agreement: [facility] Frequency: Bi-Weekly    Medication issues Addressed (what was done): na    Follow up appointments scheduled by [facility] Staff: na  Referrals made by [facility] staff: fe

## 2024-05-22 ENCOUNTER — PATIENT OUTREACH (OUTPATIENT)
Dept: HOME HEALTH SERVICES | Age: 74
End: 2024-05-22
Payer: MEDICARE

## 2024-05-22 ENCOUNTER — TELEPHONE (OUTPATIENT)
Dept: ENDOCRINOLOGY | Facility: CLINIC | Age: 74
End: 2024-05-22

## 2024-05-22 ENCOUNTER — OFFICE VISIT (OUTPATIENT)
Dept: CARDIOLOGY | Facility: HOSPITAL | Age: 74
End: 2024-05-22
Payer: MEDICARE

## 2024-05-22 VITALS
BODY MASS INDEX: 35.84 KG/M2 | HEART RATE: 72 BPM | DIASTOLIC BLOOD PRESSURE: 67 MMHG | WEIGHT: 208.78 LBS | SYSTOLIC BLOOD PRESSURE: 138 MMHG | OXYGEN SATURATION: 96 %

## 2024-05-22 VITALS — HEART RATE: 69 BPM | DIASTOLIC BLOOD PRESSURE: 73 MMHG | SYSTOLIC BLOOD PRESSURE: 132 MMHG | OXYGEN SATURATION: 98 %

## 2024-05-22 DIAGNOSIS — I50.22 SYSTOLIC CHF, CHRONIC (MULTI): ICD-10-CM

## 2024-05-22 DIAGNOSIS — E11.65 TYPE 2 DIABETES MELLITUS WITH HYPERGLYCEMIA, WITHOUT LONG-TERM CURRENT USE OF INSULIN (MULTI): ICD-10-CM

## 2024-05-22 DIAGNOSIS — I10 ESSENTIAL HYPERTENSION: ICD-10-CM

## 2024-05-22 DIAGNOSIS — I48.0 PAROXYSMAL ATRIAL FIBRILLATION (MULTI): ICD-10-CM

## 2024-05-22 DIAGNOSIS — I42.9 CARDIOMYOPATHY, UNSPECIFIED TYPE (MULTI): Primary | ICD-10-CM

## 2024-05-22 LAB
ATRIAL RATE: 68 BPM
P AXIS: 21 DEGREES
P OFFSET: 217 MS
P ONSET: 147 MS
PR INTERVAL: 144 MS
Q ONSET: 219 MS
QRS COUNT: 11 BEATS
QRS DURATION: 90 MS
QT INTERVAL: 562 MS
QTC CALCULATION(BAZETT): 597 MS
QTC FREDERICIA: 586 MS
R AXIS: -8 DEGREES
T AXIS: 14 DEGREES
T OFFSET: 500 MS
VENTRICULAR RATE: 68 BPM

## 2024-05-22 PROCEDURE — 1111F DSCHRG MED/CURRENT MED MERGE: CPT | Performed by: INTERNAL MEDICINE

## 2024-05-22 PROCEDURE — 1160F RVW MEDS BY RX/DR IN RCRD: CPT | Performed by: INTERNAL MEDICINE

## 2024-05-22 PROCEDURE — 99214 OFFICE O/P EST MOD 30 MIN: CPT | Mod: 25 | Performed by: INTERNAL MEDICINE

## 2024-05-22 PROCEDURE — 93010 ELECTROCARDIOGRAM REPORT: CPT | Performed by: INTERNAL MEDICINE

## 2024-05-22 PROCEDURE — 1159F MED LIST DOCD IN RCRD: CPT | Performed by: INTERNAL MEDICINE

## 2024-05-22 PROCEDURE — 4010F ACE/ARB THERAPY RXD/TAKEN: CPT | Performed by: INTERNAL MEDICINE

## 2024-05-22 PROCEDURE — 99214 OFFICE O/P EST MOD 30 MIN: CPT | Performed by: INTERNAL MEDICINE

## 2024-05-22 PROCEDURE — 1157F ADVNC CARE PLAN IN RCRD: CPT | Performed by: INTERNAL MEDICINE

## 2024-05-22 PROCEDURE — 93005 ELECTROCARDIOGRAM TRACING: CPT | Performed by: INTERNAL MEDICINE

## 2024-05-22 PROCEDURE — 1036F TOBACCO NON-USER: CPT | Performed by: INTERNAL MEDICINE

## 2024-05-22 PROCEDURE — 3075F SYST BP GE 130 - 139MM HG: CPT | Performed by: INTERNAL MEDICINE

## 2024-05-22 PROCEDURE — 3078F DIAST BP <80 MM HG: CPT | Performed by: INTERNAL MEDICINE

## 2024-05-22 NOTE — TELEPHONE ENCOUNTER
Left a VM asking about the 5mg of mounjaro. She is wondering when she should/can titrate up. She also reports that she had a heart procedure done recently and was in the hospital for 5 days.     Please call her or send a my chart to advise

## 2024-05-22 NOTE — PROGRESS NOTES
Chief Complaint:   No chief complaint on file.     History Of Present Illness:    Alycia Denny is a 74 y.o. female presenting for follow-up.  She was admitted last month with new heart failure in the setting of atrial fibrillation.  She was cardioverted and placed on medical therapy--has been feeling a lot better.  Weight is down 40 pounds since admission.  She denies any recurrent palpitations, progressive dyspnea, chest discomfort.  Compliant with medications.     Last Recorded Vitals:  Vitals:    05/22/24 1049   BP: 138/67   Pulse: 72   SpO2: 96%   Weight: 94.7 kg (208 lb 12.4 oz)       Past Medical History:  She has a past medical history of Hyperlipidemia, unspecified (11/28/2022), Neuralgia and neuritis, unspecified (05/18/2022), and Type 2 diabetes mellitus without complications (Multi) (11/28/2022).    Past Surgical History:  She has no past surgical history on file.      Social History:  She reports that she has never smoked. She has never used smokeless tobacco. She reports that she does not drink alcohol and does not use drugs.    Family History:  No family history on file.     Allergies:  Guaifenesin-dm, Aspirin, Clarithromycin, Jardiance [empagliflozin], Metformin, and Rosuvastatin    Outpatient Medications:  Current Outpatient Medications   Medication Instructions    Eliquis 5 mg, oral, 2 times daily    flash glucose sensor kit (FreeStyle Gabbi 2 Sensor) kit Change the sensor every 14 days    furosemide (LASIX) 40 mg, oral, Daily    Jardiance 10 mg, oral, Daily    losartan (COZAAR) 25 mg, oral, Daily    metoprolol tartrate (LOPRESSOR) 50 mg, oral, 2 times daily    Mounjaro 5 mg, subcutaneous, Once Weekly    Mounjaro 2.5 mg, subcutaneous, Once Weekly    multivit-min-folic acid-biotin (Women's Multivitamin w-Biotin) 200-300 mcg tablet,chewable 1 tablet, oral, Daily       Physical Exam:  Constitutional:       Appearance: Healthy appearance. Not in distress.   Neck:      Vascular: No JVR. JVD normal.    Pulmonary:      Effort: Pulmonary effort is normal.      Breath sounds: Normal breath sounds. No wheezing. No rhonchi. No rales.   Chest:      Chest wall: Not tender to palpatation.   Cardiovascular:      Normal rate. Regular rhythm.      Murmurs: There is no murmur.   Edema:     Peripheral edema absent.   Abdominal:      General: Bowel sounds are normal.      Palpations: Abdomen is soft.      Tenderness: There is no abdominal tenderness.   Musculoskeletal: Normal range of motion. Skin:     General: Skin is warm and dry.   Neurological:      General: No focal deficit present.      Mental Status: Alert and oriented to person, place and time.           Last Labs:  CBC -  Lab Results   Component Value Date    WBC 8.2 05/10/2024    HGB 15.0 05/10/2024    HCT 48.2 (H) 05/10/2024    MCV 88 05/10/2024     05/10/2024       CMP -  Lab Results   Component Value Date    CALCIUM 10.1 05/10/2024    PHOS 3.3 04/25/2024    PROT 8.0 05/10/2024    ALBUMIN 4.8 05/10/2024    AST 34 05/10/2024    ALT 41 05/10/2024    ALKPHOS 60 05/10/2024    BILITOT 0.8 05/10/2024       LIPID PANEL -   Lab Results   Component Value Date    CHOL 200 (H) 12/16/2022    TRIG 177 (H) 12/16/2022    HDL 31.3 (A) 12/16/2022    CHHDL 6.4 (A) 12/16/2022    LDLF 133 (H) 12/16/2022    VLDL 35 12/16/2022    NHDL 187 10/07/2021       RENAL FUNCTION PANEL -   Lab Results   Component Value Date    GLUCOSE 110 (H) 05/10/2024     05/10/2024    K 3.7 05/10/2024     05/10/2024    CO2 27 05/10/2024    ANIONGAP 14 05/10/2024    BUN 19 05/10/2024    CREATININE 0.95 05/10/2024    CALCIUM 10.1 05/10/2024    PHOS 3.3 04/25/2024    ALBUMIN 4.8 05/10/2024        Lab Results   Component Value Date     (H) 05/10/2024    HGBA1C 8.1 (A) 06/05/2023       Last Cardiology Tests:  ECG independently reviewed from today: Sinus rhythm, rate 72, nonspecific T wave abnormality    Echo 4/25/2024:   1. Left ventricular systolic function is mildly decreased with a  40-45% estimated ejection fraction.   2. Right ventricular volume overload.   3. There is mildly reduced right ventricular systolic function.   4. Mild to moderately elevated right ventricular systolic pressure.   5. The patient is in atrial fibrillation which may influence the estimate of left ventricular function and transvalvular flows.   6. There is global hypokinesis of the left ventricle with minor regional variations.    Assessment/Plan   Very pleasant 74-year-old female with dyslipidemia, diabetes, hypertension, paroxysmal atrial fibrillation.  He was found to have a reduced ejection fraction on her echo when in the hospital, however as I suspect this may be more related to atrial fibrillation.  I am going to have her get a repeat echocardiogram now that she is in sinus rhythm and has been on medical therapy.  Plan to continue current apixaban, Lasix, losartan, metoprolol.  She is not taking her Jardiance that was prescribed as she had a reaction to it prior, okay obviously not to take it at this time.  We will see her in 6 months or sooner if needed.

## 2024-05-23 ENCOUNTER — HOME CARE VISIT (OUTPATIENT)
Dept: HOME HEALTH SERVICES | Facility: HOME HEALTH | Age: 74
End: 2024-05-23
Payer: MEDICARE

## 2024-05-23 ENCOUNTER — TELEMEDICINE (OUTPATIENT)
Dept: CARE COORDINATION | Age: 74
End: 2024-05-23
Payer: MEDICARE

## 2024-05-23 ENCOUNTER — PATIENT OUTREACH (OUTPATIENT)
Dept: HOME HEALTH SERVICES | Age: 74
End: 2024-05-23

## 2024-05-23 ENCOUNTER — TELEMEDICINE (OUTPATIENT)
Dept: PHARMACY | Facility: HOSPITAL | Age: 74
End: 2024-05-23
Payer: MEDICARE

## 2024-05-23 VITALS — SYSTOLIC BLOOD PRESSURE: 120 MMHG | OXYGEN SATURATION: 95 % | HEART RATE: 74 BPM | DIASTOLIC BLOOD PRESSURE: 77 MMHG

## 2024-05-23 VITALS
RESPIRATION RATE: 18 BRPM | TEMPERATURE: 98 F | SYSTOLIC BLOOD PRESSURE: 123 MMHG | DIASTOLIC BLOOD PRESSURE: 72 MMHG | HEART RATE: 90 BPM | OXYGEN SATURATION: 95 %

## 2024-05-23 DIAGNOSIS — I10 ESSENTIAL HYPERTENSION: ICD-10-CM

## 2024-05-23 DIAGNOSIS — I50.9 CONGESTIVE HEART FAILURE, UNSPECIFIED HF CHRONICITY, UNSPECIFIED HEART FAILURE TYPE (MULTI): ICD-10-CM

## 2024-05-23 DIAGNOSIS — T78.40XA ALLERGY, INITIAL ENCOUNTER: ICD-10-CM

## 2024-05-23 DIAGNOSIS — I48.91 ATRIAL FIBRILLATION WITH RAPID VENTRICULAR RESPONSE (MULTI): ICD-10-CM

## 2024-05-23 DIAGNOSIS — I48.91 ATRIAL FIBRILLATION WITH RVR (MULTI): Primary | ICD-10-CM

## 2024-05-23 DIAGNOSIS — I50.20 SYSTOLIC CONGESTIVE HEART FAILURE, UNSPECIFIED HF CHRONICITY (MULTI): ICD-10-CM

## 2024-05-23 DIAGNOSIS — I48.91 ATRIAL FIBRILLATION, UNSPECIFIED TYPE (MULTI): Primary | ICD-10-CM

## 2024-05-23 DIAGNOSIS — E11.9 TYPE 2 DIABETES MELLITUS WITHOUT COMPLICATION, UNSPECIFIED WHETHER LONG TERM INSULIN USE (MULTI): ICD-10-CM

## 2024-05-23 DIAGNOSIS — Z79.01 ANTICOAGULANT LONG-TERM USE: ICD-10-CM

## 2024-05-23 DIAGNOSIS — E11.69 TYPE 2 DIABETES MELLITUS WITH OTHER SPECIFIED COMPLICATION, WITHOUT LONG-TERM CURRENT USE OF INSULIN (MULTI): ICD-10-CM

## 2024-05-23 PROCEDURE — G0300 HHS/HOSPICE OF LPN EA 15 MIN: HCPCS

## 2024-05-23 RX ORDER — BENZONATATE 100 MG/1
100 CAPSULE ORAL 3 TIMES DAILY PRN
Qty: 42 CAPSULE | Refills: 3 | Status: SHIPPED | OUTPATIENT
Start: 2024-05-23 | End: 2024-11-19

## 2024-05-23 RX ORDER — LORATADINE 10 MG/1
10 TABLET ORAL DAILY
Qty: 30 TABLET | Refills: 5 | Status: SHIPPED | OUTPATIENT
Start: 2024-05-23 | End: 2024-11-19

## 2024-05-23 RX ORDER — TIRZEPATIDE 5 MG/.5ML
5 INJECTION, SOLUTION SUBCUTANEOUS
Qty: 2 ML | Refills: 3 | Status: SHIPPED | OUTPATIENT
Start: 2024-05-26 | End: 2024-05-30 | Stop reason: SDUPTHER

## 2024-05-23 ASSESSMENT — PAIN SCALES - PAIN ASSESSMENT IN ADVANCED DEMENTIA (PAINAD)
TOTALSCORE: 0
BODYLANGUAGE: 0 - RELAXED.
NEGVOCALIZATION: 0
NEGVOCALIZATION: 0 - NONE.
BREATHING: 0
FACIALEXPRESSION: 0 - SMILING OR INEXPRESSIVE.
BODYLANGUAGE: 0
CONSOLABILITY: 0 - NO NEED TO CONSOLE.
FACIALEXPRESSION: 0
CONSOLABILITY: 0

## 2024-05-23 ASSESSMENT — ENCOUNTER SYMPTOMS
APPETITE LEVEL: FAIR
SUBJECTIVE PAIN PROGRESSION: UNCHANGED
DENIES PAIN: 1
PAIN SEVERITY GOAL: 0/10
STOOL FREQUENCY: DAILY
COUGH: 1
PERSON REPORTING PAIN: PATIENT
HIGHEST PAIN SEVERITY IN PAST 24 HOURS: 0/10
LOWER EXTREMITY EDEMA: 1
LOWEST PAIN SEVERITY IN PAST 24 HOURS: 0/10
COUGH CHARACTERISTICS: PRODUCTIVE
BOWEL PATTERN NORMAL: 1
CHANGE IN APPETITE: DECREASED
LAST BOWEL MOVEMENT: 66983

## 2024-05-23 NOTE — PROGRESS NOTES
"Pharmacy Post-Discharge Visit - Follow Up     Lexis Denny \"Alycia\" is a 74 y.o. female was referred to Clinical Pharmacy Team to complete a post-discharge medication optimization and monitoring visit.  The patient was referred for their Afib, CHF and diabetes management while also enrolled in [facility].     Referring Provider: Jeronimo Gonzalez MD  PCP: Jennifer Allen, PITA-CNP - next visit: 5/29      Subjective   Allergies   Allergen Reactions    Guaifenesin-Dm Cardiac arrhythmia/arrest and Anxiety    Aspirin Other    Clarithromycin Other    Jardiance [Empagliflozin] Other     dehydrated    Metformin GI Upset    Rosuvastatin Unknown       [id number] - [place], [place] - [address] [facility]  [address] [facility] [address]  Phone: [phone] Fax: [phone]    [facility]  [address]  [facility] [address]  Phone: [phone] Fax: [phone]    [id number] - [place], [place] - [address] [address]  [address] [facility] [address]  Phone: [phone] Fax: [phone]    [facility]  [address] [facility] [address]  Phone: [phone] Fax: [phone]      Medication System Management:  Affordability/Accessibility: approved for PAP  Adherence/Organization: no concerns       Social History     Social History Narrative    Not on file          HPI  Diabetes  She presents for her follow-up diabetic visit. She has type 2 diabetes mellitus. Her disease course has been improving. There are no hypoglycemic associated symptoms. There are no hypoglycemic complications. Diabetic complications include heart disease and peripheral neuropathy. Risk factors for coronary artery disease include diabetes mellitus and obesity. Current diabetic treatment includes insulin injections and oral agent (monotherapy). She is compliant with treatment all of the time. She is following a diabetic diet. An ACE inhibitor/angiotensin II receptor blocker is " being taken.      CHF ASSESSMENT  Staging:  Most recent ejection fraction: 40%  NYHA Stage: III  ACC/AHA Stage: C     Symptom Assessment:  Weight changes/edema?: No  Dyspnea?: With exertion  Dizziness/syncope/palpitations?: No     Current Regimen:  ARNI/ACEi/ARB: Yes - losartan  Beta Blocker: Yes - metoprolol  MRA: No  SGLT2i: No     Other therapy:  Eliquis   Lasix      Secondary Prevention:  The 10-year ASCVD risk score (Veronica CARABALLO, et al., 2019) is: 26.3%    Values used to calculate the score:      Age: 74 years      Sex: Female      Is Non- : No      Diabetic: Yes      Tobacco smoker: No      Systolic Blood Pressure: 110 mmHg      Is BP treated: Yes      HDL Cholesterol: 31.3 mg/dL      Total Cholesterol: 200 mg/dL     Aspirin 81mg? no  Statin?: No  HTN?: No     Review of Systems        Objective     There were no vitals taken for this visit.   BP Readings from Last 4 Encounters:   05/22/24 132/73   05/22/24 138/67   05/21/24 113/75   05/20/24 118/73      There were no vitals filed for this visit.     LAB  Lab Results   Component Value Date    BILITOT 0.8 05/10/2024    CALCIUM 10.1 05/10/2024    CO2 27 05/10/2024     05/10/2024    CREATININE 0.95 05/10/2024    GLUCOSE 110 (H) 05/10/2024    ALKPHOS 60 05/10/2024    K 3.7 05/10/2024    PROT 8.0 05/10/2024     05/10/2024    AST 34 05/10/2024    ALT 41 05/10/2024    BUN 19 05/10/2024    ANIONGAP 14 05/10/2024    MG 2.10 05/10/2024    PHOS 3.3 04/25/2024    ALBUMIN 4.8 05/10/2024    GFRF 80 06/05/2023     Lab Results   Component Value Date    TRIG 177 (H) 12/16/2022    CHOL 200 (H) 12/16/2022    HDL 31.3 (A) 12/16/2022     Lab Results   Component Value Date    HGBA1C 8.1 (A) 06/05/2023         Current Outpatient Medications   Medication Instructions    Eliquis 5 mg, oral, 2 times daily    flash glucose sensor kit (FreeStyle Gabbi 2 Sensor) kit Change the sensor every 14 days    furosemide (LASIX) 40 mg, oral, Daily    Jardiance 10  mg, oral, Daily    losartan (COZAAR) 25 mg, oral, Daily    metoprolol tartrate (LOPRESSOR) 50 mg, oral, 2 times daily    Mounjaro 5 mg, subcutaneous, Once Weekly    Mounjaro 2.5 mg, subcutaneous, Once Weekly    multivit-min-folic acid-biotin (Women's Multivitamin w-Biotin) 200-300 mcg tablet,chewable 1 tablet, oral, Daily            Assessment/Plan   Problem List Items Addressed This Visit    None    DM   Most recent A1c was 8.1% (6/5/23)  Fasting today 123  She wears CGM and is supposed to be on mounjaro 5mg once weekly but due to back order had to go back to 2.5mg weekly for now  But being managed by other clinical pharmacist   Afib   Has felt really good since being home   No feelings of palpitations and not having any chest pain  Continue metoprolol and eliquis   Eliquis has been added to PAP --> was shipped on 5/2  CHF   Continue GDMT --> metoprolol and losartan   Continue lasix daily   Continue with daily weights and BP's   Ill be getting repeat ECHO in November then can re-evaluate GDMT     Follow Up: 1 week     Continue all meds under the continuation of care with the referring provider and clinical pharmacy team.    Tushar Caceres, [place]     Verbal consent to manage patient's drug therapy was obtained from the patient. They were informed they may decline to participate or withdraw from participation in pharmacy services at any time.

## 2024-05-23 NOTE — PROGRESS NOTES
Daily Call Note:   [facility] completed with Dr. Jeronimo Gonzalez and Tushar     Patient states she saw the cardiologist yesterday and her numbers are looking good.  Patient states she does not have to take Jardiance.    Weight   Patient states she is not feeling great today. Patient states she is exhausted.   Patient also complains of cough and allergies.  Ms. Denny did not get sob with exertion yesterday.    Dr. Gonzalez will prescribe Claritan and Tessalon Pearles  Patient states she did a lot yesterday.  Patient has a home care nurse visit today.   Blood Pressure 120/77, Hr 74, oxygen 95     Glucose 123     [facility] 5/30/24 @ 11:00      Pt Education:   Barriers:   Topics for Daily Review:   Pt demonstrates clear understanding:     Daily Weight:  There were no vitals filed for this visit.   Last 3 Weights:  Wt Readings from Last 7 Encounters:   05/22/24 94.7 kg (208 lb 12.4 oz)   05/10/24 113 kg (250 lb)   05/01/24 113 kg (250 lb)   04/25/24 115 kg (254 lb 6.6 oz)   04/25/24 113 kg (250 lb)   12/29/22 106 kg (233 lb)   11/29/22 106 kg (233 lb)       Masimo Device:    Masimo Clinical Impression:     Virtual Visits--Scheduled (Most Recent Date at Top)  Follow up Appointments  Recent Visits  Date Type Provider Dept   04/25/24 Office Visit OUMAR Rodriguez [place] [address] [facility]   Showing recent visits within past 30 days and meeting all other requirements  Future Appointments  Date Type Provider Dept   05/29/24 Appointment OUMAR Rodriguez [place] [address] [facility]   Showing future appointments within next 90 days and meeting all other requirements       Frequency of RN Calls & Virtual Visits per Team Agreement:     Medication issues Addressed (what was done):     Follow up appointments scheduled by [facility] Staff:   Referrals made by [facility] staff:

## 2024-05-23 NOTE — PROGRESS NOTES
Brief summary of hospitalization or reason for referral  73 yo F admitted with new onset A fib with RVR. She was started on metoprolol and heparin drip. She did not convert to sinus rhythm on her own over x3 days and remained rate controlled. She denied symptoms once her rate was controlled. On 4/29 she underwent BETHEL with DCCV which was successful in converting her to normal sinus rhythm. She was started on eliquis, metoprolol, losartan. She was discharged to home in stable condition to follow up with pcp and cardiologist.   Admission Dx and Associated Referral Dx: Diabetes mellitus, atrial fibrillation    Interval Subjective: Doing well and saw cardiologist yesterday. Weight is down by 40 lbs. No chest pain or SOB. Has cough from allergy. 120/77 74 pulse 95% on room air.     Masimo: No  Oxygen: No     CPAP/BIPAP: No    Wt Readings from Last 3 Encounters:   05/22/24 94.7 kg (208 lb 12.4 oz)   05/10/24 113 kg (250 lb)   05/01/24 113 kg (250 lb)       Medications Issues/Refill need  Medication Follow up action needed: none    Requested Prescriptions      No prescriptions requested or ordered in this encounter       Upcoming Visits:  Recent Visits  Date Type Provider Dept   05/09/24 Telemedicine Divine Chapin MD M[facility]   04/25/24 Office Visit OUMAR Rodriguez [place] [id number]   Showing recent visits within past 30 days and meeting all other requirements  Today's Visits  Date Type Provider Dept   05/23/24 Appointment [facility]   Showing today's visits and meeting all other requirements  Future Appointments  Date Type Provider Dept   05/29/24 App[facility]ment [facility]   Showing future appointments within next 90 days and meeting all other requirements      Interval or Pertinent Labs/Testing:  Lab Review:   Hemoglobin A1C   Date/Time Value Ref Range Status   06/05/2023 01:38 PM 8.1 (A) % Final     Comment:           Diagnosis of Diabetes-Adults   Non-Diabetic: < or = 5.6%   Increased risk for developing diabetes: 5.7-6.4%   Diagnostic of diabetes: > or = 6.5%  .       Monitoring of Diabetes                Age (y)     Therapeutic Goal (%)   Adults:          >18           <7.0   Pediatrics:    13-18           <7.5                   7-12           <8.0                   0- 6            7.5-8.5   American Diabetes Association. Diabetes Care 33(S1), Jan 2010.     01/27/2022 12:16 PM 11.9 (A) % Final     Comment:          Diagnosis of Diabetes-Adults   Non-Diabetic: < or = 5.6%   Increased risk for developing diabetes: 5.7-6.4%   Diagnostic of diabetes: > or = 6.5%  .       Monitoring of Diabetes                Age (y)     Therapeutic Goal (%)   Adults:          >18           <7.0   Pediatrics:    13-18           <7.5                   7-12           <8.0                   0- 6            7.5-8.5   American Diabetes Association. Diabetes Care 33(S1), Jan 2010.     10/07/2021 09:26 AM 7.9 (A) % Final     Comment:          Diagnosis of Diabetes-Adults   Non-Diabetic: < or = 5.6%   Increased risk for developing diabetes: 5.7-6.4%   Diagnostic of diabetes: > or = 6.5%  .       Monitoring of Diabetes                Age (y)     Therapeutic Goal (%)   Adults:          >18           <7.0   Pediatrics:    13-18           <7.5                   7-12           <8.0                   0- 6            7.5-8.5   American Diabetes Association. Diabetes Care 33(S1), Jan 2010.         Admission on 05/10/2024, Discharged on 05/10/2024   Component Date Value    WBC 05/10/2024 8.2     nRBC 05/10/2024 0.0     RBC 05/10/2024 5.48 (H)     Hemoglobin 05/10/2024 15.0     Hematocrit 05/10/2024 48.2 (H)     MCV 05/10/2024 88     MCH 05/10/2024 27.4     MCHC 05/10/2024 31.1 (L)     RDW 05/10/2024 14.7 (H)     Platelets 05/10/2024 324     Neutrophils % 05/10/2024 58.6     Immature Granulocytes %,* 05/10/2024 0.4     Lymphocytes % 05/10/2024 24.7     Monocytes  % 05/10/2024 11.2     Eosinophils % 05/10/2024 3.8     Basophils % 05/10/2024 1.3     Neutrophils Absolute 05/10/2024 4.79     Immature Granulocytes Ab* 05/10/2024 0.03     Lymphocytes Absolute 05/10/2024 2.02     Monocytes Absolute 05/10/2024 0.92 (H)     Eosinophils Absolute 05/10/2024 0.31     Basophils Absolute 05/10/2024 0.11 (H)     Glucose 05/10/2024 110 (H)     Sodium 05/10/2024 137     Potassium 05/10/2024 3.7     Chloride 05/10/2024 100     Bicarbonate 05/10/2024 27     Anion Gap 05/10/2024 14     Urea Nitrogen 05/10/2024 19     Creatinine 05/10/2024 0.95     eGFR 05/10/2024 63     Calcium 05/10/2024 10.1     Albumin 05/10/2024 4.8     Alkaline Phosphatase 05/10/2024 60     Total Protein 05/10/2024 8.0     AST 05/10/2024 34     Bilirubin, Total 05/10/2024 0.8     ALT 05/10/2024 41     Magnesium 05/10/2024 2.10     Ventricular Rate 05/10/2024 68     Atrial Rate 05/10/2024 68     NM Interval 05/10/2024 144     QRS Duration 05/10/2024 90     QT Interval 05/10/2024 562     QTC Calculation(Bazett) 05/10/2024 597     P Axis 05/10/2024 21     R Adams 05/10/2024 -8     T Adams 05/10/2024 14     QRS Count 05/10/2024 11     Q Onset 05/10/2024 219     P Onset 05/10/2024 147     P Offset 05/10/2024 217     T Offset 05/10/2024 500     QTC Fredericia 05/10/2024 586     Troponin I, High Sensiti* 05/10/2024 7     BNP 05/10/2024 211 (H)     Troponin I, High Sensiti* 05/10/2024 7    Admission on 04/25/2024, Discharged on 04/29/2024   Component Date Value    WBC 04/25/2024 9.8     nRBC 04/25/2024 0.0     RBC 04/25/2024 4.70     Hemoglobin 04/25/2024 12.9     Hematocrit 04/25/2024 41.8     MCV 04/25/2024 89     MCH 04/25/2024 27.4     MCHC 04/25/2024 30.9 (L)     RDW 04/25/2024 15.6 (H)     Platelets 04/25/2024 285     Neutrophils % 04/25/2024 71.1     Immature Granulocytes %,* 04/25/2024 0.3     Lymphocytes % 04/25/2024 15.4     Monocytes % 04/25/2024 11.8     Eosinophils % 04/25/2024 0.7     Basophils % 04/25/2024 0.7      Neutrophils Absolute 04/25/2024 6.99 (H)     Immature Granulocytes Ab* 04/25/2024 0.03     Lymphocytes Absolute 04/25/2024 1.52     Monocytes Absolute 04/25/2024 1.16 (H)     Eosinophils Absolute 04/25/2024 0.07     Basophils Absolute 04/25/2024 0.07     Glucose 04/25/2024 130 (H)     Sodium 04/25/2024 127 (L)     Potassium 04/25/2024 4.0     Chloride 04/25/2024 94 (L)     Bicarbonate 04/25/2024 23     Anion Gap 04/25/2024 14     Urea Nitrogen 04/25/2024 15     Creatinine 04/25/2024 0.78     eGFR 04/25/2024 80     Calcium 04/25/2024 9.0     Phosphorus 04/25/2024 3.3     Lactate 04/25/2024 1.1     BNP 04/25/2024 421 (H)     Ventricular Rate 04/25/2024 155     QRS Duration 04/25/2024 88     QT Interval 04/25/2024 304     QTC Calculation(Bazett) 04/25/2024 488     R Axis 04/25/2024 2     T Rippey 04/25/2024 4     QRS Count 04/25/2024 25     Q Onset 04/25/2024 216     T Offset 04/25/2024 368     QTC Fredericia 04/25/2024 416     Troponin I, High Sensiti* 04/25/2024 13     Troponin I, High Sensiti* 04/25/2024 13     AV pk quintin 04/26/2024 1.76     AV mn grad 04/26/2024 7.5     LV Biplane EF 04/26/2024 44     MV avg E/e' ratio 04/26/2024 21.43     MV E/A ratio 04/26/2024 70.95     LA vol index A/L 04/26/2024 39.3     Tricuspid annular plane * 04/26/2024 1.6     RV free wall pk S' 04/26/2024 7.00     LVIDd 04/26/2024 5.25     RVSP 04/26/2024 49.3     AV pk grad 04/26/2024 12.4     LV A4C EF 04/26/2024 46.0     POCT Glucose 04/25/2024 151 (H)     Heparin Unfractionated 04/25/2024 0.5     WBC 04/26/2024 8.3     nRBC 04/26/2024 0.0     RBC 04/26/2024 4.20     Hemoglobin 04/26/2024 11.5 (L)     Hematocrit 04/26/2024 37.5     MCV 04/26/2024 89     MCH 04/26/2024 27.4     MCHC 04/26/2024 30.7 (L)     RDW 04/26/2024 15.6 (H)     Platelets 04/26/2024 260     Glucose 04/26/2024 113 (H)     Sodium 04/26/2024 127 (L)     Potassium 04/26/2024 3.9     Chloride 04/26/2024 93 (L)     Bicarbonate 04/26/2024 27     Anion Gap 04/26/2024 11      Urea Nitrogen 04/26/2024 15     Creatinine 04/26/2024 0.76     eGFR 04/26/2024 82     Calcium 04/26/2024 8.5 (L)     Heparin Unfractionated 04/26/2024 0.7     POCT Glucose 04/26/2024 117 (H)     POCT Glucose 04/26/2024 149 (H)     Heparin Unfractionated 04/26/2024 1.0     POCT Glucose 04/26/2024 131 (H)     WBC 04/26/2024 7.9     nRBC 04/26/2024 0.0     RBC 04/26/2024 4.16     Hemoglobin 04/26/2024 11.5 (L)     Hematocrit 04/26/2024 37.2     MCV 04/26/2024 89     MCH 04/26/2024 27.6     MCHC 04/26/2024 30.9 (L)     RDW 04/26/2024 15.9 (H)     Platelets 04/26/2024 263     Heparin Unfractionated 04/26/2024 0.9     POCT Glucose 04/26/2024 127 (H)     Heparin Unfractionated 04/26/2024 0.7     Heparin Unfractionated 04/27/2024 0.7     WBC 04/27/2024 6.7     nRBC 04/27/2024 0.0     RBC 04/27/2024 4.10     Hemoglobin 04/27/2024 11.2 (L)     Hematocrit 04/27/2024 36.2     MCV 04/27/2024 88     MCH 04/27/2024 27.3     MCHC 04/27/2024 30.9 (L)     RDW 04/27/2024 15.9 (H)     Platelets 04/27/2024 262     Glucose 04/27/2024 101 (H)     Sodium 04/27/2024 132 (L)     Potassium 04/27/2024 3.0 (L)     Chloride 04/27/2024 96 (L)     Bicarbonate 04/27/2024 27     Anion Gap 04/27/2024 12     Urea Nitrogen 04/27/2024 13     Creatinine 04/27/2024 0.86     eGFR 04/27/2024 71     Calcium 04/27/2024 8.5 (L)     POCT Glucose 04/27/2024 121 (H)     POCT Glucose 04/27/2024 124 (H)     POCT Glucose 04/27/2024 117 (H)     Heparin Unfractionated 04/28/2024 0.5     POCT Glucose 04/27/2024 150 (H)     Extra Tube 04/28/2024 Hold for add-ons.     Extra Tube 04/28/2024 Hold for add-ons.     POCT Glucose 04/28/2024 110 (H)     POCT Glucose 04/28/2024 130 (H)     POCT Glucose 04/28/2024 144 (H)     WBC 04/29/2024 7.2     nRBC 04/29/2024 0.0     RBC 04/29/2024 4.31     Hemoglobin 04/29/2024 11.9 (L)     Hematocrit 04/29/2024 38.9     MCV 04/29/2024 90     MCH 04/29/2024 27.6     MCHC 04/29/2024 30.6 (L)     RDW 04/29/2024 16.1 (H)     Platelets  04/29/2024 256     Glucose 04/29/2024 107 (H)     Sodium 04/29/2024 138     Potassium 04/29/2024 3.0 (L)     Chloride 04/29/2024 98     Bicarbonate 04/29/2024 30     Anion Gap 04/29/2024 13     Urea Nitrogen 04/29/2024 18     Creatinine 04/29/2024 0.96     eGFR 04/29/2024 62     Calcium 04/29/2024 8.8     POCT Glucose 04/28/2024 148 (H)     POCT Glucose 04/28/2024 139 (H)     Magnesium 04/29/2024 1.98     POCT Glucose 04/29/2024 117 (H)     Ventricular Rate 04/29/2024 75     Atrial Rate 04/29/2024 75     DC Interval 04/29/2024 138     QRS Duration 04/29/2024 86     QT Interval 04/29/2024 420     QTC Calculation(Bazett) 04/29/2024 469     P Axis 04/29/2024 15     R Axis 04/29/2024 36     T Axis 04/29/2024 33     QRS Count 04/29/2024 12     Q Onset 04/29/2024 219     P Onset 04/29/2024 150     P Offset 04/29/2024 213     T Offset 04/29/2024 429     QTC Fredericia 04/29/2024 452         SDOH Concerns & Interventions: none    Assessment/Plan  Atrial fibrillation s/p BETHEL DC cardioversion during hospitalization  Continue with Eliquis, metoprolol  Cardiology consultation and visit from 5/22/2024 noted  Continue with Eliquis, Lasix, losartan, metoprolol  Plan for repeat echocardiogram  Acute on chronic systolic congestive heart failure.  EF 40 to 45% in April 2024  Had reaction to Jardiance and is not taking it  Continue with Lasix, losartan, metoprolol  Daily weight  Low-salt diet  Diabetes mellitus with most recent A1c of 8.1  Continue with Mounjaro  4. Cough from allergy  Claritin script was sent along with Tessalon joseph.     This visit took place over the phone with patient, RN, pharmacist Tushar and myself.    Jeronimo Gonzalez MD.

## 2024-05-24 ENCOUNTER — PHARMACY VISIT (OUTPATIENT)
Dept: PHARMACY | Facility: CLINIC | Age: 74
End: 2024-05-24
Payer: COMMERCIAL

## 2024-05-24 PROCEDURE — RXMED WILLOW AMBULATORY MEDICATION CHARGE

## 2024-05-25 ENCOUNTER — PATIENT OUTREACH (OUTPATIENT)
Dept: HOME HEALTH SERVICES | Age: 74
End: 2024-05-25
Payer: MEDICARE

## 2024-05-25 VITALS — DIASTOLIC BLOOD PRESSURE: 69 MMHG | HEART RATE: 72 BPM | SYSTOLIC BLOOD PRESSURE: 117 MMHG | OXYGEN SATURATION: 98 %

## 2024-05-25 NOTE — PROGRESS NOTES
Daily Call Note: Call complete. Patient reports that she is doing good. She did get the Claritin and Tessalon and they are helping, her cough is improved. Patient is on Mounjaro 2.5 mg and states that the jardiance has been discontinued. Next [facility] is on 5/30/24 at 1100. No other questions at this time.     /69   Pulse 72   SpO2 98%       Pt Education: per POC  Barriers: none  Topics for Daily Review: BP, blood sugar  Pt demonstrates clear understanding: Yes    Daily Weight:  There were no vitals filed for this visit.   Last 3 Weights:  Wt Readings from Last 7 Encounters:   05/22/24 94.7 kg (208 lb 12.4 oz)   05/10/24 113 kg (250 lb)   05/01/24 113 kg (250 lb)   04/25/24 115 kg (254 lb 6.6 oz)   04/25/24 113 kg (250 lb)   12/29/22 106 kg (233 lb)   11/29/22 106 kg (233 lb)       Masimo Device: No   Masimo Clinical Impression: N/A    Virtual Visits--Scheduled (Most Recent Date at Top)  Follow up Appointments  Recent Visits  Date Type Provider Dept   04/25/24 Office Visit OUMAR Rodriguez [place] [id number] [facility]   Showing recent visits within past 30 days and meeting all other requirements  Future Appointments  Date Type Provider Dept   05/29/24 Appointment OUMAR Rodriguez [place] [address] [facility]   Showing future appointments within next 90 days and meeting all other requirements       Frequency of RN Calls & Virtual Visits per Team Agreement: [facility] Frequency: T/TH/SAT    Medication issues Addressed (what was done): none    Follow up appointments scheduled by [facility] Staff: none  Referrals made by [facility] staff: none

## 2024-05-28 ENCOUNTER — PATIENT OUTREACH (OUTPATIENT)
Dept: HOME HEALTH SERVICES | Age: 74
End: 2024-05-28
Payer: MEDICARE

## 2024-05-28 VITALS — DIASTOLIC BLOOD PRESSURE: 80 MMHG | SYSTOLIC BLOOD PRESSURE: 123 MMHG | OXYGEN SATURATION: 97 % | HEART RATE: 72 BPM

## 2024-05-28 LAB
ATRIAL RATE: 72 BPM
P AXIS: 51 DEGREES
P OFFSET: 219 MS
P ONSET: 147 MS
PR INTERVAL: 148 MS
Q ONSET: 221 MS
QRS COUNT: 12 BEATS
QRS DURATION: 86 MS
QT INTERVAL: 494 MS
QTC CALCULATION(BAZETT): 540 MS
QTC FREDERICIA: 525 MS
R AXIS: 40 DEGREES
T AXIS: 13 DEGREES
T OFFSET: 468 MS
VENTRICULAR RATE: 72 BPM

## 2024-05-28 NOTE — PROGRESS NOTES
Daily Call Note:   Spoke to patient, she reports feeling alright. She reports last few few days vitals, and sugars, all stable. She did report having a busy weekend with a wedding shower and holiday.   She did report Saturday evening, episode of low BG in 50's. No nausea, vomiting or fever. She did report taking her Monjaro a day early (sat instead of sun) so that was her only change. She denies any symptoms since then. She took glucose tab and juice and recovered. Denies drinking alcohol that evening.   States only change was Monjaro timing.   PCP appointment tomorrow.   Discussed next [facility].     Pt Education: POC  Barriers: na  Topics for Daily Review: POC  Pt demonstrates clear understanding: yes    Daily Weight:  There were no vitals filed for this visit.   Last 3 Weights:  Wt Readings from Last 7 Encounters:   05/22/24 94.7 kg (208 lb 12.4 oz)   05/10/24 113 kg (250 lb)   05/01/24 113 kg (250 lb)   04/25/24 115 kg (254 lb 6.6 oz)   04/25/24 113 kg (250 lb)   12/29/22 106 kg (233 lb)   11/29/22 106 kg (233 lb)       Masimo Device: No   Masimo Clinical Impression: na    Virtual Visits--Scheduled (Most Recent Date at Top)  Follow up Appointments  Recent Visits  No visits were found meeting these conditions.  Showing recent visits within past 30 days and meeting all other requirements  Future Appointments  Date Type Provider Dept   05/29/24 Appointment PITA Rodriguez-JIE [place] [phone] [facility]   Showing future appointments within next 90 days and meeting all other requirements       Frequency of RN Calls & Virtual Visits per Team Agreement: [facility] Frequency: Bi-Weekly    Medication issues Addressed (what was done): na    Follow up appointments scheduled by [facility] Staff: na  Referrals made by [facility] staff: fe

## 2024-05-29 ENCOUNTER — OFFICE VISIT (OUTPATIENT)
Dept: PRIMARY CARE | Facility: CLINIC | Age: 74
End: 2024-05-29
Payer: MEDICARE

## 2024-05-29 VITALS
SYSTOLIC BLOOD PRESSURE: 134 MMHG | WEIGHT: 205 LBS | DIASTOLIC BLOOD PRESSURE: 78 MMHG | HEART RATE: 68 BPM | HEIGHT: 64 IN | BODY MASS INDEX: 35 KG/M2

## 2024-05-29 DIAGNOSIS — E11.69 TYPE 2 DIABETES MELLITUS WITH OTHER SPECIFIED COMPLICATION, WITHOUT LONG-TERM CURRENT USE OF INSULIN (MULTI): ICD-10-CM

## 2024-05-29 DIAGNOSIS — R06.09 DYSPNEA ON EXERTION: ICD-10-CM

## 2024-05-29 DIAGNOSIS — Z00.00 MEDICARE ANNUAL WELLNESS VISIT, SUBSEQUENT: ICD-10-CM

## 2024-05-29 DIAGNOSIS — E66.01 MORBID (SEVERE) OBESITY DUE TO EXCESS CALORIES (MULTI): ICD-10-CM

## 2024-05-29 DIAGNOSIS — R05.2 SUBACUTE COUGH: ICD-10-CM

## 2024-05-29 DIAGNOSIS — Z00.00 ROUTINE GENERAL MEDICAL EXAMINATION AT HEALTH CARE FACILITY: Primary | ICD-10-CM

## 2024-05-29 PROCEDURE — 99214 OFFICE O/P EST MOD 30 MIN: CPT | Performed by: NURSE PRACTITIONER

## 2024-05-29 PROCEDURE — 1111F DSCHRG MED/CURRENT MED MERGE: CPT | Performed by: NURSE PRACTITIONER

## 2024-05-29 PROCEDURE — 3075F SYST BP GE 130 - 139MM HG: CPT | Performed by: NURSE PRACTITIONER

## 2024-05-29 PROCEDURE — G0439 PPPS, SUBSEQ VISIT: HCPCS | Performed by: NURSE PRACTITIONER

## 2024-05-29 PROCEDURE — 1160F RVW MEDS BY RX/DR IN RCRD: CPT | Performed by: NURSE PRACTITIONER

## 2024-05-29 PROCEDURE — RXMED WILLOW AMBULATORY MEDICATION CHARGE

## 2024-05-29 PROCEDURE — 1157F ADVNC CARE PLAN IN RCRD: CPT | Performed by: NURSE PRACTITIONER

## 2024-05-29 PROCEDURE — 1036F TOBACCO NON-USER: CPT | Performed by: NURSE PRACTITIONER

## 2024-05-29 PROCEDURE — 1124F ACP DISCUSS-NO DSCNMKR DOCD: CPT | Performed by: NURSE PRACTITIONER

## 2024-05-29 PROCEDURE — 1159F MED LIST DOCD IN RCRD: CPT | Performed by: NURSE PRACTITIONER

## 2024-05-29 PROCEDURE — 1170F FXNL STATUS ASSESSED: CPT | Performed by: NURSE PRACTITIONER

## 2024-05-29 PROCEDURE — 3078F DIAST BP <80 MM HG: CPT | Performed by: NURSE PRACTITIONER

## 2024-05-29 PROCEDURE — 4010F ACE/ARB THERAPY RXD/TAKEN: CPT | Performed by: NURSE PRACTITIONER

## 2024-05-29 RX ORDER — ALBUTEROL SULFATE 90 UG/1
2 AEROSOL, METERED RESPIRATORY (INHALATION) EVERY 4 HOURS PRN
Qty: 18 G | Refills: 1 | Status: SHIPPED | OUTPATIENT
Start: 2024-05-29 | End: 2024-06-28

## 2024-05-29 ASSESSMENT — ENCOUNTER SYMPTOMS
COUGH: 1
CARDIOVASCULAR NEGATIVE: 1
NEUROLOGICAL NEGATIVE: 1
PSYCHIATRIC NEGATIVE: 1
EYES NEGATIVE: 1
GASTROINTESTINAL NEGATIVE: 1
CONSTITUTIONAL NEGATIVE: 1

## 2024-05-29 ASSESSMENT — ACTIVITIES OF DAILY LIVING (ADL)
DOING_HOUSEWORK: INDEPENDENT
DRESSING: INDEPENDENT
TAKING_MEDICATION: INDEPENDENT
MANAGING_FINANCES: INDEPENDENT
BATHING: INDEPENDENT
GROCERY_SHOPPING: INDEPENDENT

## 2024-05-29 ASSESSMENT — PATIENT HEALTH QUESTIONNAIRE - PHQ9
1. LITTLE INTEREST OR PLEASURE IN DOING THINGS: NOT AT ALL
SUM OF ALL RESPONSES TO PHQ9 QUESTIONS 1 AND 2: 0
2. FEELING DOWN, DEPRESSED OR HOPELESS: NOT AT ALL

## 2024-05-29 NOTE — PROGRESS NOTES
"Subjective   Patient ID: Alycia Denny is a 74 y.o. female who presents for post hospital follow up and [facility].      HPI   Here today with her .  Had wonderful nursing care while inpatient and now with homecare.  Was admitted to hospital at her last appointment here back in April.  Doing very well since discharge and is following with cardiology for a fib and heart failure.  She is in regular rhythm today.  Did have ED visit for cough since. Related to allergies per patient.  Following [employer] book. Has lost 40 pounds since original admission.  Follows with endocrine. Agreed to have A1C drawn.  Does daily BP at home, always lower than 130/70.  Denies chest pain, SOB, palpitations, dizziness,  or GI issues.      Review of Systems   Constitutional: Negative.    HENT:  Positive for congestion.    Eyes: Negative.    Respiratory:  Positive for cough.    Cardiovascular: Negative.    Gastrointestinal: Negative.    Genitourinary: Negative.    Skin: Negative.    Neurological: Negative.    Psychiatric/Behavioral: Negative.         Objective   /78   Pulse 68   Ht 1.626 m (5' 4\")   Wt 93 kg (205 lb)   BMI 35.19 kg/m²     Physical Exam  Constitutional:       General: She is not in acute distress.     Appearance: Normal appearance.   HENT:      Head: Normocephalic and atraumatic.      Right Ear: Tympanic membrane, ear canal and external ear normal.      Left Ear: Tympanic membrane, ear canal and external ear normal.      Nose: Nose normal.      Mouth/Throat:      Mouth: Mucous membranes are moist.      Pharynx: Oropharynx is clear.   Eyes:      Extraocular Movements: Extraocular movements intact.      Conjunctiva/sclera: Conjunctivae normal.      Pupils: Pupils are equal, round, and reactive to light.   Cardiovascular:      Rate and Rhythm: Normal rate and regular rhythm.      Pulses: Normal pulses.      Heart sounds: Normal heart sounds. No murmur heard.  Pulmonary:      Effort: Pulmonary effort is " normal.      Breath sounds: Decreased air movement present. No wheezing, rhonchi or rales.      Comments: Moist cough  Abdominal:      General: Bowel sounds are normal.      Palpations: Abdomen is soft.      Tenderness: There is no abdominal tenderness.   Musculoskeletal:         General: Normal range of motion.      Cervical back: Normal range of motion and neck supple.   Lymphadenopathy:      Comments: No lymphadenopathy noted   Skin:     General: Skin is warm and dry.      Findings: No rash.   Neurological:      General: No focal deficit present.      Mental Status: She is alert and oriented to person, place, and time.      Cranial Nerves: No cranial nerve deficit.      Coordination: Coordination normal.      Gait: Gait normal.   Psychiatric:         Mood and Affect: Mood normal.         Behavior: Behavior normal.         Assessment/Plan   Problem List Items Addressed This Visit             ICD-10-CM    Cough R05.9     Continue tessalon pearls and allergy medication for cough  Start albuterol inhaler as  directed for cough.  Let me know if this is helpful  Increase Lasix to 40 mg twice daily for 3 days to see if this helps cough.  Have BMP drawn to check K+ in 5 days         Relevant Medications    albuterol (Ventolin HFA) 90 mcg/actuation inhaler    Diabetes mellitus, type 2 (Multi) E11.9     Have A1c drawn.  Follow up with endocrine as scheduled.         Relevant Orders    Hemoglobin A1C    Dyspnea on exertion R06.09     Take Lasix 40 mg twice daily for 3 days         Relevant Orders    Basic metabolic panel    Routine general medical examination at health care facility - Primary Z00.00    Morbid (severe) obesity due to excess calories (Multi) E66.01     Working on weight loss through diet. Down 40 pounds          Other Visit Diagnoses         Codes    Medicare annual wellness visit, subsequent     Z00.00        Follow up in 6 months or sooner if needed       Patient was identified as a fall risk. Risk  prevention instructions provided.

## 2024-05-29 NOTE — ASSESSMENT & PLAN NOTE
Continue tessalon pearls and allergy medication for cough  Start albuterol inhaler as  directed for cough.  Let me know if this is helpful  Increase Lasix to 40 mg twice daily for 3 days to see if this helps cough.  Have BMP drawn to check K+ in 5 days

## 2024-05-29 NOTE — PATIENT INSTRUCTIONS

## 2024-05-30 ENCOUNTER — PATIENT OUTREACH (OUTPATIENT)
Dept: HOME HEALTH SERVICES | Age: 74
End: 2024-05-30

## 2024-05-30 ENCOUNTER — TELEMEDICINE (OUTPATIENT)
Dept: PHARMACY | Facility: HOSPITAL | Age: 74
End: 2024-05-30
Payer: MEDICARE

## 2024-05-30 ENCOUNTER — TELEMEDICINE CLINICAL SUPPORT (OUTPATIENT)
Dept: CARE COORDINATION | Age: 74
End: 2024-05-30
Payer: MEDICARE

## 2024-05-30 DIAGNOSIS — I50.9 CONGESTIVE HEART FAILURE, UNSPECIFIED HF CHRONICITY, UNSPECIFIED HEART FAILURE TYPE (MULTI): ICD-10-CM

## 2024-05-30 DIAGNOSIS — E11.9 TYPE 2 DIABETES MELLITUS WITHOUT COMPLICATION, UNSPECIFIED WHETHER LONG TERM INSULIN USE (MULTI): ICD-10-CM

## 2024-05-30 DIAGNOSIS — I48.91 ATRIAL FIBRILLATION WITH RVR (MULTI): Primary | ICD-10-CM

## 2024-05-30 DIAGNOSIS — E11.65 TYPE 2 DIABETES MELLITUS WITH HYPERGLYCEMIA, WITHOUT LONG-TERM CURRENT USE OF INSULIN (MULTI): ICD-10-CM

## 2024-05-30 PROCEDURE — RXMED WILLOW AMBULATORY MEDICATION CHARGE

## 2024-05-30 RX ORDER — TIRZEPATIDE 5 MG/.5ML
5 INJECTION, SOLUTION SUBCUTANEOUS
Qty: 2 ML | Refills: 3 | Status: SHIPPED | OUTPATIENT
Start: 2024-05-30

## 2024-05-30 RX ORDER — TIRZEPATIDE 5 MG/.5ML
5 INJECTION, SOLUTION SUBCUTANEOUS
Qty: 2 ML | Refills: 3 | Status: SHIPPED | OUTPATIENT
Start: 2024-06-02 | End: 2024-05-30

## 2024-05-30 NOTE — PROGRESS NOTES
"Pharmacy Post-Discharge Visit - Follow Up     Lexis Denny \"Alycia\" is a 74 y.o. female was referred to Clinical Pharmacy Team to complete a post-discharge medication optimization and monitoring visit.  The patient was referred for their Afib, CHF and diabetes management while also enrolled in [facility].     Referring Provider: Yadiel Sánchez DO  PCP: Jennifer Allen, PITA-CNP - last visit: 5/29/24      Subjective   Allergies   Allergen Reactions    Guaifenesin-Dm Cardiac arrhythmia/arrest and Anxiety    Aspirin Other    Clarithromycin Other    Jardiance [Empagliflozin] Other     dehydrated    Metformin GI Upset    Rosuvastatin Unknown       [id number] - [place], [place] - [address] [facility]  [address] [facility] [address]  Phone: [phone] Fax: [phone]    [facility]  [address], [address]  [facility] [address]  Phone: [phone] Fax: [phone]    [id number] - [facility] [address] [address]  [address] [facility] [address]  Phone: [phone] Fax: [phone]    [facility]  [address] [facility] [address]  Phone: [phone] Fax: [phone]      Medication System Management:  Affordability/Accessibility: approved for PAP  Adherence/Organization: no concerns       Social History     Social History Narrative    Not on file          HPI  Diabetes  She presents for her follow-up diabetic visit. She has type 2 diabetes mellitus. Her disease course has been improving. There are no hypoglycemic associated symptoms. There are no hypoglycemic complications. Diabetic complications include heart disease and peripheral neuropathy. Risk factors for coronary artery disease include diabetes mellitus and obesity. Current diabetic treatment includes insulin injections and oral agent (monotherapy). She is compliant with treatment all of the time. She is following a diabetic diet. An ACE inhibitor/angiotensin II receptor " blocker is being taken.      CHF ASSESSMENT  Staging:  Most recent ejection fraction: 40%  NYHA Stage: III  ACC/AHA Stage: C     Symptom Assessment:  Weight changes/edema?: No  Dyspnea?: With exertion  Dizziness/syncope/palpitations?: No     Current Regimen:  ARNI/ACEi/ARB: Yes - losartan  Beta Blocker: Yes - metoprolol  MRA: No  SGLT2i: No     Other therapy:  Eliquis   Lasix      Secondary Prevention:  The 10-year ASCVD risk score (Veronica CARABALLO, et al., 2019) is: 26.3%    Values used to calculate the score:      Age: 74 years      Sex: Female      Is Non- : No      Diabetic: Yes      Tobacco smoker: No      Systolic Blood Pressure: 110 mmHg      Is BP treated: Yes      HDL Cholesterol: 31.3 mg/dL      Total Cholesterol: 200 mg/dL     Aspirin 81mg? no  Statin?: No  HTN?: No     Review of Systems        Objective     There were no vitals taken for this visit.   BP Readings from Last 4 Encounters:   05/29/24 134/78   05/28/24 123/80   05/25/24 117/69   05/23/24 120/77      There were no vitals filed for this visit.     LAB  Lab Results   Component Value Date    BILITOT 0.8 05/10/2024    CALCIUM 10.1 05/10/2024    CO2 27 05/10/2024     05/10/2024    CREATININE 0.95 05/10/2024    GLUCOSE 110 (H) 05/10/2024    ALKPHOS 60 05/10/2024    K 3.7 05/10/2024    PROT 8.0 05/10/2024     05/10/2024    AST 34 05/10/2024    ALT 41 05/10/2024    BUN 19 05/10/2024    ANIONGAP 14 05/10/2024    MG 2.10 05/10/2024    PHOS 3.3 04/25/2024    ALBUMIN 4.8 05/10/2024    GFRF 80 06/05/2023     Lab Results   Component Value Date    TRIG 177 (H) 12/16/2022    CHOL 200 (H) 12/16/2022    HDL 31.3 (A) 12/16/2022     Lab Results   Component Value Date    HGBA1C 8.1 (A) 06/05/2023         Current Outpatient Medications   Medication Instructions    albuterol (Ventolin HFA) 90 mcg/actuation inhaler 2 puffs, inhalation, Every 4 hours PRN    benzonatate (TESSALON) 100 mg, oral, 3 times daily PRN, Do not crush or chew.     Eliquis 5 mg, oral, 2 times daily    flash glucose sensor kit (FreeStyle Gabbi 2 Sensor) kit Change the sensor every 14 days    furosemide (LASIX) 40 mg, oral, Daily    loratadine (CLARITIN) 10 mg, oral, Daily    losartan (COZAAR) 25 mg, oral, Daily    metoprolol tartrate (LOPRESSOR) 50 mg, oral, 2 times daily    Mounjaro 5 mg, subcutaneous, Once Weekly    multivit-min-folic acid-biotin (Women's Multivitamin w-Biotin) 200-300 mcg tablet,chewable 1 tablet, oral, Daily            Assessment/Plan   Problem List Items Addressed This Visit    None    DM   Most recent A1c was 8.1% (6/5/23)  Fasting today 123  She wears CGM and is supposed to be on mounjaro 5mg once weekly   But being managed by other clinical pharmacist   Afib   Has felt really good since being home   No feelings of palpitations and not having any chest pain  Continue metoprolol and eliquis   Eliquis has been added to PAP --> was shipped on 5/2  CHF   Continue GDMT --> metoprolol and losartan   Continue lasix daily   Continue with daily weights and BP's   Ill be getting repeat ECHO in November then can re-evaluate GDMT    Overall, she is doing very well and is happy to graduate from [facility] today. She has all her follow ups and medication refills needed. We discussed she is able to call still though with any questions or concerns as nursing is available 24/7.     Follow Up: as needed     Continue all meds under the continuation of care with the referring provider and clinical pharmacy team.    Tushar Caceres PharmD     Verbal consent to manage patient's drug therapy was obtained from the patient. They were informed they may decline to participate or withdraw from participation in pharmacy services at any time.

## 2024-05-30 NOTE — PROGRESS NOTES
Daily Call Note: [facility] weekly call complete w this RN, Tushar Batista D and Dr Sánchez.  Pt reports she is feeling well.  Has PCP appt yesterday.  No concerns voiced.  B/P 113/81  HR 71  .  Pt to graduate from [facility] today, pt in agreement.    Pt Education: POC  Barriers:   Topics for Daily Review:   Pt demonstrates clear understanding: Yes    Daily Weight:  There were no vitals filed for this visit.   Last 3 Weights:  Wt Readings from Last 7 Encounters:   05/29/24 93 kg (205 lb)   05/22/24 94.7 kg (208 lb 12.4 oz)   05/10/24 113 kg (250 lb)   05/01/24 113 kg (250 lb)   04/25/24 115 kg (254 lb 6.6 oz)   04/25/24 113 kg (250 lb)   12/29/22 106 kg (233 lb)       Masimo Device: No   Masimo Clinical Impression:     Virtual Visits--Scheduled (Most Recent Date at Top)  Follow up Appointments  Recent Visits  Date Type Provider Dept   05/29/24 Office Visit Jennifer Allen, PITA-[facility] [place] [address] [facility]   Showing recent visits within past 30 days and meeting all other requirements  Future Appointments  No visits were found meeting these conditions.  Showing future appointments within next 90 days and meeting all other requirements       Frequency of RN Calls & Virtual Visits per Team Agreement: [facility] Frequency: Daily    Medication issues Addressed (what was done):     Follow up appointments scheduled by [facility] Staff:   Referrals made by [facility] staff:

## 2024-05-30 NOTE — PROGRESS NOTES
Brief summary of hospitalization or reason for referral    75 yo F admitted with new onset A fib with RVR. She was started on metoprolol and heparin drip. She did not convert to sinus rhythm on her own over x3 days and remained rate controlled. She denied symptoms once her rate was controlled. On 4/29 she underwent BETHEL with DCCV which was successful in converting her to normal sinus rhythm. She was started on eliquis, metoprolol, losartan. She was discharged to home in stable condition to follow up with pcp and cardiologist.     Admission Dx and Associated Referral Dx: Diabetes mellitus, atrial fibrillation     Interval Subjective: patient is doing very well, she is in great spirits. She saw her PCP yesterday and was wheezing a little yesterday and told to start albuterol inhaler and increased lasix 40mg BID for 3 days. She reports in the past albuterol gave her palpitations and  has not filled it.     Masimo: No  Oxygen: No  CPAP/BIPAP: No    Wt Readings from Last 3 Encounters:   05/29/24 93 kg (205 lb)   05/22/24 94.7 kg (208 lb 12.4 oz)   05/10/24 113 kg (250 lb)       Medications Issues/Refill need  Medication Follow up action needed: None    Upcoming Visits:  Recent Visits  Date Type Provider Dept   05/29/24 Office Visit P[facility] [place] [address]   05/23/24 Telemedicine Jeronimo Gonzalez MD [facility]   05/09/24 Telemedicine Divine Chapin MD MPH [facility]   Showing recent visits within past 30 days and meeting all other requirements  Today's Visits  Date Type Provider Dept   05/30/24 Appointment [facility]   Showing today's visits and meeting all other requirements  Future Appointments  No visits were found meeting these conditions.  Showing future appointments within next 90 days and meeting all other requirements      Interval or Pertinent Labs/Testing:  Lab Review:   Hemoglobin A1C   Date/Time Value Ref Range Status   06/05/2023 01:38 PM 8.1  (A) % Final     Comment:          Diagnosis of Diabetes-Adults   Non-Diabetic: < or = 5.6%   Increased risk for developing diabetes: 5.7-6.4%   Diagnostic of diabetes: > or = 6.5%  .       Monitoring of Diabetes                Age (y)     Therapeutic Goal (%)   Adults:          >18           <7.0   Pediatrics:    13-18           <7.5                   7-12           <8.0                   0- 6            7.5-8.5   American Diabetes Association. Diabetes Care 33(S1), Jan 2010.     01/27/2022 12:16 PM 11.9 (A) % Final     Comment:          Diagnosis of Diabetes-Adults   Non-Diabetic: < or = 5.6%   Increased risk for developing diabetes: 5.7-6.4%   Diagnostic of diabetes: > or = 6.5%  .       Monitoring of Diabetes                Age (y)     Therapeutic Goal (%)   Adults:          >18           <7.0   Pediatrics:    13-18           <7.5                   7-12           <8.0                   0- 6            7.5-8.5   American Diabetes Association. Diabetes Care 33(S1), Jan 2010.     10/07/2021 09:26 AM 7.9 (A) % Final     Comment:          Diagnosis of Diabetes-Adults   Non-Diabetic: < or = 5.6%   Increased risk for developing diabetes: 5.7-6.4%   Diagnostic of diabetes: > or = 6.5%  .       Monitoring of Diabetes                Age (y)     Therapeutic Goal (%)   Adults:          >18           <7.0   Pediatrics:    13-18           <7.5                   7-12           <8.0                   0- 6            7.5-8.5   American Diabetes Association. Diabetes Care 33(S1), Jan 2010.         not applicable     SDOH Concerns & Interventions: None    Assessment/Plan    Patient to graduate from the program today!    Atrial fibrillation s/p BETHEL DC cardioversion during hospitalization  - Continue with Eliquis, metoprolol, Lasix, losartan,   - Plan for repeat echocardiogram    Acute on chronic systolic congestive heart failure  -  EF 40 to 45% in April 2024  - Had reaction to Jardiance and is not taking it  - Continue with Lasix,  losartan, metoprolol  - Daily weight and Low-salt diet    Diabetes mellitus   - A1c of 8.1  - Continue with Mounjaro    4. Cough from allergy  - Claritin script was sent along with Tessalon joseph.   - PCP wrote for albuterol and increased lasix. Pt reports albuterol gives her palpitations and has not filled it but does report she has improved symptoms already    Teleconference performed with Adamaris Camara from nursing and Tushar Caceres from pharmacy.    Yadiel Sánchez  Internal Medicine

## 2024-05-31 ENCOUNTER — PHARMACY VISIT (OUTPATIENT)
Dept: PHARMACY | Facility: CLINIC | Age: 74
End: 2024-05-31
Payer: COMMERCIAL

## 2024-06-01 ENCOUNTER — HOME CARE VISIT (OUTPATIENT)
Dept: HOME HEALTH SERVICES | Facility: HOME HEALTH | Age: 74
End: 2024-06-01
Payer: MEDICARE

## 2024-06-04 ENCOUNTER — PATIENT MESSAGE (OUTPATIENT)
Dept: ENDOCRINOLOGY | Facility: CLINIC | Age: 74
End: 2024-06-04
Payer: MEDICARE

## 2024-06-14 PROCEDURE — RXMED WILLOW AMBULATORY MEDICATION CHARGE

## 2024-06-15 ENCOUNTER — PHARMACY VISIT (OUTPATIENT)
Dept: PHARMACY | Facility: CLINIC | Age: 74
End: 2024-06-15
Payer: COMMERCIAL

## 2024-06-17 ENCOUNTER — PHARMACY VISIT (OUTPATIENT)
Dept: PHARMACY | Facility: CLINIC | Age: 74
End: 2024-06-17
Payer: COMMERCIAL

## 2024-06-17 PROCEDURE — RXMED WILLOW AMBULATORY MEDICATION CHARGE

## 2024-06-18 ENCOUNTER — PHARMACY VISIT (OUTPATIENT)
Dept: PHARMACY | Facility: CLINIC | Age: 74
End: 2024-06-18
Payer: COMMERCIAL

## 2024-06-27 ENCOUNTER — LAB (OUTPATIENT)
Dept: LAB | Facility: LAB | Age: 74
End: 2024-06-27
Payer: MEDICARE

## 2024-06-27 DIAGNOSIS — R06.09 DYSPNEA ON EXERTION: ICD-10-CM

## 2024-06-27 DIAGNOSIS — E11.69 TYPE 2 DIABETES MELLITUS WITH OTHER SPECIFIED COMPLICATION, WITHOUT LONG-TERM CURRENT USE OF INSULIN (MULTI): ICD-10-CM

## 2024-06-27 LAB
ANION GAP SERPL CALC-SCNC: 16 MMOL/L (ref 10–20)
BUN SERPL-MCNC: 27 MG/DL (ref 6–23)
CALCIUM SERPL-MCNC: 9.7 MG/DL (ref 8.6–10.3)
CHLORIDE SERPL-SCNC: 98 MMOL/L (ref 98–107)
CO2 SERPL-SCNC: 27 MMOL/L (ref 21–32)
CREAT SERPL-MCNC: 0.9 MG/DL (ref 0.5–1.05)
EGFRCR SERPLBLD CKD-EPI 2021: 67 ML/MIN/1.73M*2
GLUCOSE SERPL-MCNC: 98 MG/DL (ref 74–99)
POTASSIUM SERPL-SCNC: 3.8 MMOL/L (ref 3.5–5.3)
SODIUM SERPL-SCNC: 137 MMOL/L (ref 136–145)

## 2024-06-27 PROCEDURE — 36415 COLL VENOUS BLD VENIPUNCTURE: CPT

## 2024-06-27 PROCEDURE — 83036 HEMOGLOBIN GLYCOSYLATED A1C: CPT

## 2024-06-27 PROCEDURE — 80048 BASIC METABOLIC PNL TOTAL CA: CPT

## 2024-06-28 ENCOUNTER — HOME CARE VISIT (OUTPATIENT)
Dept: HOME HEALTH SERVICES | Facility: HOME HEALTH | Age: 74
End: 2024-06-28
Payer: MEDICARE

## 2024-06-28 LAB
EST. AVERAGE GLUCOSE BLD GHB EST-MCNC: 166 MG/DL
HBA1C MFR BLD: 7.4 %

## 2024-06-30 ENCOUNTER — PATIENT MESSAGE (OUTPATIENT)
Dept: ENDOCRINOLOGY | Facility: CLINIC | Age: 74
End: 2024-06-30
Payer: MEDICARE

## 2024-06-30 DIAGNOSIS — E11.65 TYPE 2 DIABETES MELLITUS WITH HYPERGLYCEMIA, WITHOUT LONG-TERM CURRENT USE OF INSULIN (MULTI): Primary | ICD-10-CM

## 2024-07-01 ENCOUNTER — TELEPHONE (OUTPATIENT)
Dept: CARDIOLOGY | Facility: HOSPITAL | Age: 74
End: 2024-07-01
Payer: MEDICARE

## 2024-07-02 ENCOUNTER — TELEMEDICINE (OUTPATIENT)
Dept: PHARMACY | Facility: HOSPITAL | Age: 74
End: 2024-07-02
Payer: MEDICARE

## 2024-07-02 DIAGNOSIS — E11.65 TYPE 2 DIABETES MELLITUS WITH HYPERGLYCEMIA, WITHOUT LONG-TERM CURRENT USE OF INSULIN (MULTI): ICD-10-CM

## 2024-07-02 DIAGNOSIS — I50.9 CONGESTIVE HEART FAILURE, UNSPECIFIED HF CHRONICITY, UNSPECIFIED HEART FAILURE TYPE (MULTI): ICD-10-CM

## 2024-07-02 DIAGNOSIS — E11.9 TYPE 2 DIABETES MELLITUS WITHOUT COMPLICATION, UNSPECIFIED WHETHER LONG TERM INSULIN USE (MULTI): ICD-10-CM

## 2024-07-02 DIAGNOSIS — I48.91 ATRIAL FIBRILLATION WITH RVR (MULTI): Primary | ICD-10-CM

## 2024-07-02 PROCEDURE — RXMED WILLOW AMBULATORY MEDICATION CHARGE

## 2024-07-02 RX ORDER — DAPAGLIFLOZIN 10 MG/1
10 TABLET, FILM COATED ORAL DAILY
Qty: 90 TABLET | Refills: 3 | Status: SHIPPED | OUTPATIENT
Start: 2024-07-02

## 2024-07-02 RX ORDER — DAPAGLIFLOZIN 5 MG/1
5 TABLET, FILM COATED ORAL DAILY
Qty: 90 TABLET | Refills: 1 | Status: SHIPPED | OUTPATIENT
Start: 2024-07-02 | End: 2024-07-02 | Stop reason: SDUPTHER

## 2024-07-02 ASSESSMENT — ACTIVITIES OF DAILY LIVING (ADL)
OASIS_M1830: 00
HOME_HEALTH_OASIS: 00

## 2024-07-02 NOTE — PROGRESS NOTES
"Pharmacy Post-Discharge Visit - Follow Up    Lexis Denny \"Alycia\" is a 74 y.o. female was referred to Clinical Pharmacy Team to complete a post-discharge medication optimization and monitoring visit.  The patient was referred for their Afib, CHF and diabetes management.     Referring Provider: Milton Baum MD  PCP: Jennifer Allen, APRN-CNP - last visit: 5/29/24, next visit: 12/4      Subjective   Allergies   Allergen Reactions    Guaifenesin-Dm Cardiac arrhythmia/arrest and Anxiety    Aspirin Other    Clarithromycin Other    Jardiance [Empagliflozin] Other     dehydrated    Metformin GI Upset    Rosuvastatin Unknown       [id number] - [place], [place] - [address] [facility]  [address] [facility] [address]  Phone: [phone] Fax: [phone]    [facility]  [address]  [facility] [address]  Phone: [phone] Fax: [phone]    [id number] - [facility] [address]  [address] [facility] [address]  Phone: [phone] Fax: [phone]    [facility]  [address] [facility] [address]  Phone: [phone] Fax: [phone]      Medication System Management:  Affordability/Accessibility: approved for PAP  Adherence/Organization: no concerns       Social History     Social History Narrative    Not on file          HPI  Diabetes  She presents for her follow-up diabetic visit. She has type 2 diabetes mellitus. Her disease course has been improving. There are no hypoglycemic associated symptoms. There are no hypoglycemic complications. Diabetic complications include heart disease and peripheral neuropathy. Risk factors for coronary artery disease include diabetes mellitus and obesity. Current diabetic treatment includes insulin injections and oral agent (monotherapy). She is compliant with treatment all of the time. She is following a diabetic diet. An ACE inhibitor/angiotensin II receptor blocker is " being taken.      CHF ASSESSMENT  Staging:  Most recent ejection fraction: 40%  NYHA Stage: III  ACC/AHA Stage: C     Symptom Assessment:  Weight changes/edema?: No  Dyspnea?: With exertion  Dizziness/syncope/palpitations?: No     Current Regimen:  ARNI/ACEi/ARB: Yes - losartan  Beta Blocker: Yes - metoprolol  MRA: No  SGLT2i: No     Other therapy:  Eliquis   Lasix      Secondary Prevention:  The 10-year ASCVD risk score (Veronica CARABALLO, et al., 2019) is: 26.3%    Values used to calculate the score:      Age: 74 years      Sex: Female      Is Non- : No      Diabetic: Yes      Tobacco smoker: No      Systolic Blood Pressure: 110 mmHg      Is BP treated: Yes      HDL Cholesterol: 31.3 mg/dL      Total Cholesterol: 200 mg/dL     Aspirin 81mg? no  Statin?: No  HTN?: No     Review of Systems        Objective     There were no vitals taken for this visit.   BP Readings from Last 4 Encounters:   05/29/24 134/78   05/28/24 123/80   05/25/24 117/69   05/23/24 120/77      There were no vitals filed for this visit.     LAB  Lab Results   Component Value Date    BILITOT 0.8 05/10/2024    CALCIUM 9.7 06/27/2024    CO2 27 06/27/2024    CL 98 06/27/2024    CREATININE 0.90 06/27/2024    GLUCOSE 98 06/27/2024    ALKPHOS 60 05/10/2024    K 3.8 06/27/2024    PROT 8.0 05/10/2024     06/27/2024    AST 34 05/10/2024    ALT 41 05/10/2024    BUN 27 (H) 06/27/2024    ANIONGAP 16 06/27/2024    MG 2.10 05/10/2024    PHOS 3.3 04/25/2024    ALBUMIN 4.8 05/10/2024    GFRF 80 06/05/2023     Lab Results   Component Value Date    TRIG 177 (H) 12/16/2022    CHOL 200 (H) 12/16/2022    HDL 31.3 (A) 12/16/2022     Lab Results   Component Value Date    HGBA1C 7.4 (H) 06/27/2024         Current Outpatient Medications   Medication Instructions    albuterol (Ventolin HFA) 90 mcg/actuation inhaler 2 puffs, inhalation, Every 4 hours PRN    benzonatate (Tessalon) 100 mg capsule Take 1 capsul by mouth three times daily as needed for  cough. Do not crush or chew.    benzonatate (Tessalon) 100 mg capsule Take 1 capsule by mouth 3 times a day as needed for cough. do not crush or chew.    benzonatate (TESSALON) 100 mg, oral, 3 times daily PRN, Do not crush or chew.    dapagliflozin propanediol (FARXIGA) 5 mg, oral, Daily    Eliquis 5 mg, oral, 2 times daily    flash glucose sensor kit (FreeStyle Gabbi 2 Sensor) kit Change the sensor every 14 days    furosemide (LASIX) 40 mg, oral, Daily    loratadine (Claritin) 10 mg tablet take 1 tablet by mouth every day    loratadine (CLARITIN) 10 mg, oral, Daily    losartan (COZAAR) 25 mg, oral, Daily    metoprolol tartrate (LOPRESSOR) 50 mg, oral, 2 times daily    Mounjaro 5 mg, subcutaneous, Once Weekly    multivit-min-folic acid-biotin (Women's Multivitamin w-Biotin) 200-300 mcg tablet,chewable 1 tablet, oral, Daily            Assessment/Plan   Problem List Items Addressed This Visit    None    DM   Most recent A1c was 8.1% (6/5/23)  She wears CGM and is supposed to be on mounjaro 5mg once weekly   But being managed by other clinical pharmacist   Afib   Has felt really good since being home   No feelings of palpitations and not having any chest pain  Continue metoprolol and eliquis   Eliquis has been added to PAP --> was shipped on 5/2  CHF   Continue GDMT --> metoprolol and losartan   Failed jardiance but pcp, cardio and endo all agree to try farxiga  Going to add to her PAP profile and will be mailed by marta   Continue lasix daily   Continue with daily weights and BP's   Will be getting repeat ECHO in November then can re-evaluate GDMT    Follow Up: as needed     Continue all meds under the continuation of care with the referring provider and clinical pharmacy team.    Tushar Caceres, [place]     Verbal consent to manage patient's drug therapy was obtained from the patient. They were informed they may decline to participate or withdraw from participation in pharmacy services at any time.

## 2024-07-02 NOTE — TELEPHONE ENCOUNTER
Yes, Farxiga is a good option to help improve blood glucose as well as helps reduce the risk of heart failure exacerbation.

## 2024-07-05 ENCOUNTER — PHARMACY VISIT (OUTPATIENT)
Dept: PHARMACY | Facility: CLINIC | Age: 74
End: 2024-07-05
Payer: COMMERCIAL

## 2024-07-08 PROCEDURE — RXMED WILLOW AMBULATORY MEDICATION CHARGE

## 2024-07-11 ENCOUNTER — PHARMACY VISIT (OUTPATIENT)
Dept: PHARMACY | Facility: CLINIC | Age: 74
End: 2024-07-11
Payer: COMMERCIAL

## 2024-07-15 ENCOUNTER — PHARMACY VISIT (OUTPATIENT)
Dept: PHARMACY | Facility: CLINIC | Age: 74
End: 2024-07-15
Payer: COMMERCIAL

## 2024-07-15 PROCEDURE — RXMED WILLOW AMBULATORY MEDICATION CHARGE

## 2024-07-21 PROCEDURE — RXMED WILLOW AMBULATORY MEDICATION CHARGE

## 2024-07-24 ENCOUNTER — PHARMACY VISIT (OUTPATIENT)
Dept: PHARMACY | Facility: CLINIC | Age: 74
End: 2024-07-24
Payer: COMMERCIAL

## 2024-08-01 PROCEDURE — RXMED WILLOW AMBULATORY MEDICATION CHARGE

## 2024-08-03 ENCOUNTER — PHARMACY VISIT (OUTPATIENT)
Dept: PHARMACY | Facility: CLINIC | Age: 74
End: 2024-08-03
Payer: COMMERCIAL

## 2024-08-05 ENCOUNTER — PHARMACY VISIT (OUTPATIENT)
Dept: PHARMACY | Facility: CLINIC | Age: 74
End: 2024-08-05
Payer: COMMERCIAL

## 2024-08-08 ENCOUNTER — OFFICE VISIT (OUTPATIENT)
Dept: NEUROLOGY | Facility: CLINIC | Age: 74
End: 2024-08-08
Payer: MEDICARE

## 2024-08-08 DIAGNOSIS — G62.9 PERIPHERAL POLYNEUROPATHY: Primary | ICD-10-CM

## 2024-08-08 DIAGNOSIS — G47.00 INSOMNIA, UNSPECIFIED TYPE: ICD-10-CM

## 2024-08-08 PROCEDURE — 1159F MED LIST DOCD IN RCRD: CPT | Performed by: PSYCHIATRY & NEUROLOGY

## 2024-08-08 PROCEDURE — 99215 OFFICE O/P EST HI 40 MIN: CPT | Performed by: PSYCHIATRY & NEUROLOGY

## 2024-08-08 PROCEDURE — 1160F RVW MEDS BY RX/DR IN RCRD: CPT | Performed by: PSYCHIATRY & NEUROLOGY

## 2024-08-08 PROCEDURE — 3051F HG A1C>EQUAL 7.0%<8.0%: CPT | Performed by: PSYCHIATRY & NEUROLOGY

## 2024-08-08 PROCEDURE — 1157F ADVNC CARE PLAN IN RCRD: CPT | Performed by: PSYCHIATRY & NEUROLOGY

## 2024-08-08 PROCEDURE — 4010F ACE/ARB THERAPY RXD/TAKEN: CPT | Performed by: PSYCHIATRY & NEUROLOGY

## 2024-08-08 RX ORDER — IBUPROFEN 200 MG
600 TABLET ORAL 3 TIMES DAILY
COMMUNITY

## 2024-08-08 NOTE — PATIENT INSTRUCTIONS
You continue to have a painful neuropathy. Please have blood tests to check your vitamin B12 and thyroid. For your foot pain please try combining wintergreen alcohol with aspirin and using a spray bottle to cool off your feet.    I will retire at the end of September, so you have a referral to a new neurologist. Dr Josh Smith is in [place] and Dr. Alejo Schmidt is in [place].

## 2024-08-09 DIAGNOSIS — I50.9 HEART FAILURE, UNSPECIFIED (MULTI): ICD-10-CM

## 2024-08-09 DIAGNOSIS — I48.91 ATRIAL FIBRILLATION WITH RVR (MULTI): ICD-10-CM

## 2024-08-09 PROCEDURE — RXMED WILLOW AMBULATORY MEDICATION CHARGE

## 2024-08-09 RX ORDER — FUROSEMIDE 40 MG/1
40 TABLET ORAL DAILY
Qty: 30 TABLET | Refills: 3 | Status: SHIPPED | OUTPATIENT
Start: 2024-08-09

## 2024-08-12 ENCOUNTER — PHARMACY VISIT (OUTPATIENT)
Dept: PHARMACY | Facility: CLINIC | Age: 74
End: 2024-08-12
Payer: COMMERCIAL

## 2024-08-16 DIAGNOSIS — I50.9 HEART FAILURE, UNSPECIFIED (MULTI): ICD-10-CM

## 2024-08-16 DIAGNOSIS — I48.91 ATRIAL FIBRILLATION WITH RVR (MULTI): ICD-10-CM

## 2024-08-16 PROCEDURE — RXMED WILLOW AMBULATORY MEDICATION CHARGE

## 2024-08-17 PROCEDURE — RXMED WILLOW AMBULATORY MEDICATION CHARGE

## 2024-08-17 RX ORDER — METOPROLOL TARTRATE 50 MG/1
50 TABLET ORAL 2 TIMES DAILY
Qty: 60 TABLET | Refills: 1 | Status: SHIPPED | OUTPATIENT
Start: 2024-08-17

## 2024-08-17 RX ORDER — LOSARTAN POTASSIUM 25 MG/1
25 TABLET ORAL DAILY
Qty: 30 TABLET | Refills: 1 | Status: SHIPPED | OUTPATIENT
Start: 2024-08-17

## 2024-08-20 ENCOUNTER — LAB (OUTPATIENT)
Dept: LAB | Facility: LAB | Age: 74
End: 2024-08-20
Payer: MEDICARE

## 2024-08-20 ENCOUNTER — PHARMACY VISIT (OUTPATIENT)
Dept: PHARMACY | Facility: CLINIC | Age: 74
End: 2024-08-20
Payer: COMMERCIAL

## 2024-08-20 DIAGNOSIS — G62.9 PERIPHERAL POLYNEUROPATHY: ICD-10-CM

## 2024-08-20 DIAGNOSIS — G47.00 INSOMNIA, UNSPECIFIED TYPE: ICD-10-CM

## 2024-08-20 LAB
T4 FREE SERPL-MCNC: 0.77 NG/DL (ref 0.61–1.12)
TSH SERPL-ACNC: 4.78 MIU/L (ref 0.44–3.98)
VIT B12 SERPL-MCNC: 435 PG/ML (ref 211–911)

## 2024-08-20 PROCEDURE — 84443 ASSAY THYROID STIM HORMONE: CPT

## 2024-08-20 PROCEDURE — 84439 ASSAY OF FREE THYROXINE: CPT

## 2024-08-20 PROCEDURE — 36415 COLL VENOUS BLD VENIPUNCTURE: CPT

## 2024-08-20 PROCEDURE — 82607 VITAMIN B-12: CPT

## 2024-08-21 ENCOUNTER — TELEPHONE (OUTPATIENT)
Dept: NEUROLOGY | Facility: CLINIC | Age: 74
End: 2024-08-21
Payer: MEDICARE

## 2024-08-21 NOTE — TELEPHONE ENCOUNTER
I spoke to Alycia after her TSH came back mildly elevated. She has been tired with inability to lose weight, so I recommended that she follow up with Dr. Clark regarding whether she should be treated for hypothyroidism. B12 was normal. She will call Dr. Clark's office for an appt.

## 2024-08-30 PROCEDURE — RXMED WILLOW AMBULATORY MEDICATION CHARGE

## 2024-09-03 ENCOUNTER — PHARMACY VISIT (OUTPATIENT)
Dept: PHARMACY | Facility: CLINIC | Age: 74
End: 2024-09-03
Payer: COMMERCIAL

## 2024-09-03 ENCOUNTER — APPOINTMENT (OUTPATIENT)
Dept: PRIMARY CARE | Facility: CLINIC | Age: 74
End: 2024-09-03
Payer: MEDICARE

## 2024-09-03 VITALS
BODY MASS INDEX: 35.19 KG/M2 | HEART RATE: 67 BPM | SYSTOLIC BLOOD PRESSURE: 134 MMHG | OXYGEN SATURATION: 94 % | WEIGHT: 205 LBS | DIASTOLIC BLOOD PRESSURE: 85 MMHG

## 2024-09-03 DIAGNOSIS — J30.0 VASOMOTOR RHINITIS: Primary | ICD-10-CM

## 2024-09-03 DIAGNOSIS — R79.89 ELEVATED TSH: ICD-10-CM

## 2024-09-03 PROCEDURE — 3079F DIAST BP 80-89 MM HG: CPT | Performed by: INTERNAL MEDICINE

## 2024-09-03 PROCEDURE — 1036F TOBACCO NON-USER: CPT | Performed by: INTERNAL MEDICINE

## 2024-09-03 PROCEDURE — 3051F HG A1C>EQUAL 7.0%<8.0%: CPT | Performed by: INTERNAL MEDICINE

## 2024-09-03 PROCEDURE — 99214 OFFICE O/P EST MOD 30 MIN: CPT | Performed by: INTERNAL MEDICINE

## 2024-09-03 PROCEDURE — 1160F RVW MEDS BY RX/DR IN RCRD: CPT | Performed by: INTERNAL MEDICINE

## 2024-09-03 PROCEDURE — 4010F ACE/ARB THERAPY RXD/TAKEN: CPT | Performed by: INTERNAL MEDICINE

## 2024-09-03 PROCEDURE — 3075F SYST BP GE 130 - 139MM HG: CPT | Performed by: INTERNAL MEDICINE

## 2024-09-03 PROCEDURE — 1159F MED LIST DOCD IN RCRD: CPT | Performed by: INTERNAL MEDICINE

## 2024-09-03 PROCEDURE — 1157F ADVNC CARE PLAN IN RCRD: CPT | Performed by: INTERNAL MEDICINE

## 2024-09-03 RX ORDER — IPRATROPIUM BROMIDE 21 UG/1
2 SPRAY, METERED NASAL 2 TIMES DAILY PRN
Qty: 30 ML | Refills: 1 | Status: SHIPPED | OUTPATIENT
Start: 2024-09-03 | End: 2025-09-03

## 2024-09-03 ASSESSMENT — PATIENT HEALTH QUESTIONNAIRE - PHQ9
SUM OF ALL RESPONSES TO PHQ9 QUESTIONS 1 AND 2: 0
1. LITTLE INTEREST OR PLEASURE IN DOING THINGS: NOT AT ALL
2. FEELING DOWN, DEPRESSED OR HOPELESS: NOT AT ALL

## 2024-09-03 NOTE — PROGRESS NOTES
Subjective   Patient ID: Alycia Denny is a 74 y.o. female who presents for Thyroid Problem (High thyroid, would like medication. States she has a hot face ).    Thyroid Problem         Had lab done by neurology TSH 4.78, T4 0.77. does report fatigue and difficulty losing weight. Does have sleep apnea, does not wear CPAP. Is on GLP1.         Review of Systems    Objective   /80   Pulse 67   Wt 93 kg (205 lb)   SpO2 94%   BMI 35.19 kg/m²     Physical Exam  Constitutional:       Appearance: Normal appearance.   Pulmonary:      Effort: Pulmonary effort is normal.   Neurological:      Mental Status: She is alert.   Psychiatric:         Mood and Affect: Mood normal.         Behavior: Behavior normal.         Thought Content: Thought content normal.         Assessment/Plan       #Subclinical hypothyroidism  -Highly unlikely fatigue and weight gain due to minimally elevated TSH/normal T4. Suspect large componenet is untreated CSA--she declines sleep med referral   -Repeat TSH/T4 with TPO in 6 months     #Vasomotor rhinitis   -Order Atrovent nasal spray

## 2024-09-04 ENCOUNTER — PHARMACY VISIT (OUTPATIENT)
Dept: PHARMACY | Facility: CLINIC | Age: 74
End: 2024-09-04
Payer: COMMERCIAL

## 2024-09-04 PROCEDURE — RXMED WILLOW AMBULATORY MEDICATION CHARGE

## 2024-09-05 ENCOUNTER — PHARMACY VISIT (OUTPATIENT)
Dept: PHARMACY | Facility: CLINIC | Age: 74
End: 2024-09-05
Payer: COMMERCIAL

## 2024-09-15 PROCEDURE — RXMED WILLOW AMBULATORY MEDICATION CHARGE

## 2024-09-19 ENCOUNTER — PHARMACY VISIT (OUTPATIENT)
Dept: PHARMACY | Facility: CLINIC | Age: 74
End: 2024-09-19
Payer: COMMERCIAL

## 2024-09-23 DIAGNOSIS — E11.65 TYPE 2 DIABETES MELLITUS WITH HYPERGLYCEMIA, WITHOUT LONG-TERM CURRENT USE OF INSULIN: ICD-10-CM

## 2024-09-23 DIAGNOSIS — I50.9 HEART FAILURE, UNSPECIFIED: ICD-10-CM

## 2024-09-23 DIAGNOSIS — I48.91 ATRIAL FIBRILLATION WITH RVR (MULTI): ICD-10-CM

## 2024-09-23 DIAGNOSIS — T78.40XA ALLERGY, INITIAL ENCOUNTER: ICD-10-CM

## 2024-09-23 PROCEDURE — RXMED WILLOW AMBULATORY MEDICATION CHARGE

## 2024-09-23 RX ORDER — TIRZEPATIDE 5 MG/.5ML
5 INJECTION, SOLUTION SUBCUTANEOUS
Qty: 2 ML | Refills: 3 | Status: SHIPPED | OUTPATIENT
Start: 2024-09-23

## 2024-09-23 RX ORDER — BENZONATATE 100 MG/1
100 CAPSULE ORAL 3 TIMES DAILY PRN
Qty: 42 CAPSULE | Refills: 3 | Status: SHIPPED | OUTPATIENT
Start: 2024-09-23 | End: 2025-03-22

## 2024-09-23 RX ORDER — LOSARTAN POTASSIUM 25 MG/1
25 TABLET ORAL DAILY
Qty: 30 TABLET | Refills: 1 | Status: SHIPPED | OUTPATIENT
Start: 2024-09-23

## 2024-09-25 ENCOUNTER — PHARMACY VISIT (OUTPATIENT)
Dept: PHARMACY | Facility: CLINIC | Age: 74
End: 2024-09-25
Payer: COMMERCIAL

## 2024-09-30 PROCEDURE — RXMED WILLOW AMBULATORY MEDICATION CHARGE

## 2024-10-01 ENCOUNTER — PHARMACY VISIT (OUTPATIENT)
Dept: PHARMACY | Facility: CLINIC | Age: 74
End: 2024-10-01
Payer: COMMERCIAL

## 2024-10-01 PROCEDURE — RXMED WILLOW AMBULATORY MEDICATION CHARGE

## 2024-10-02 ENCOUNTER — PHARMACY VISIT (OUTPATIENT)
Dept: PHARMACY | Facility: CLINIC | Age: 74
End: 2024-10-02
Payer: COMMERCIAL

## 2024-10-08 ENCOUNTER — PATIENT MESSAGE (OUTPATIENT)
Dept: ENDOCRINOLOGY | Facility: CLINIC | Age: 74
End: 2024-10-08
Payer: MEDICARE

## 2024-10-15 PROCEDURE — RXMED WILLOW AMBULATORY MEDICATION CHARGE

## 2024-10-17 ENCOUNTER — PHARMACY VISIT (OUTPATIENT)
Dept: PHARMACY | Facility: CLINIC | Age: 74
End: 2024-10-17
Payer: COMMERCIAL

## 2024-10-23 DIAGNOSIS — I50.9 HEART FAILURE, UNSPECIFIED: ICD-10-CM

## 2024-10-23 DIAGNOSIS — I48.91 ATRIAL FIBRILLATION WITH RVR (MULTI): ICD-10-CM

## 2024-10-23 PROCEDURE — RXMED WILLOW AMBULATORY MEDICATION CHARGE

## 2024-10-24 DIAGNOSIS — I50.9 HEART FAILURE, UNSPECIFIED: ICD-10-CM

## 2024-10-24 DIAGNOSIS — I48.91 ATRIAL FIBRILLATION WITH RVR (MULTI): ICD-10-CM

## 2024-10-24 PROCEDURE — RXMED WILLOW AMBULATORY MEDICATION CHARGE

## 2024-10-24 RX ORDER — METOPROLOL TARTRATE 50 MG/1
50 TABLET ORAL 2 TIMES DAILY
Qty: 60 TABLET | Refills: 1 | Status: SHIPPED | OUTPATIENT
Start: 2024-10-24 | End: 2024-10-24 | Stop reason: SDUPTHER

## 2024-10-24 RX ORDER — METOPROLOL TARTRATE 50 MG/1
50 TABLET ORAL 2 TIMES DAILY
Qty: 180 TABLET | Refills: 1 | Status: SHIPPED | OUTPATIENT
Start: 2024-10-24

## 2024-10-25 ENCOUNTER — PHARMACY VISIT (OUTPATIENT)
Dept: PHARMACY | Facility: CLINIC | Age: 74
End: 2024-10-25
Payer: COMMERCIAL

## 2024-10-25 PROCEDURE — RXMED WILLOW AMBULATORY MEDICATION CHARGE

## 2024-10-26 ENCOUNTER — PHARMACY VISIT (OUTPATIENT)
Dept: PHARMACY | Facility: CLINIC | Age: 74
End: 2024-10-26
Payer: COMMERCIAL

## 2024-10-26 PROCEDURE — RXMED WILLOW AMBULATORY MEDICATION CHARGE

## 2024-10-28 ENCOUNTER — PHARMACY VISIT (OUTPATIENT)
Dept: PHARMACY | Facility: CLINIC | Age: 74
End: 2024-10-28
Payer: COMMERCIAL

## 2024-11-07 ENCOUNTER — OFFICE VISIT (OUTPATIENT)
Dept: CARDIOLOGY | Facility: HOSPITAL | Age: 74
End: 2024-11-07
Payer: MEDICARE

## 2024-11-07 ENCOUNTER — HOSPITAL ENCOUNTER (OUTPATIENT)
Dept: CARDIOLOGY | Facility: HOSPITAL | Age: 74
Discharge: HOME | End: 2024-11-07
Payer: MEDICARE

## 2024-11-07 VITALS
HEART RATE: 63 BPM | BODY MASS INDEX: 36.48 KG/M2 | DIASTOLIC BLOOD PRESSURE: 76 MMHG | SYSTOLIC BLOOD PRESSURE: 135 MMHG | OXYGEN SATURATION: 96 % | WEIGHT: 212.52 LBS

## 2024-11-07 DIAGNOSIS — I48.0 PAROXYSMAL ATRIAL FIBRILLATION (MULTI): Primary | ICD-10-CM

## 2024-11-07 DIAGNOSIS — I50.22 SYSTOLIC CHF, CHRONIC (MULTI): ICD-10-CM

## 2024-11-07 DIAGNOSIS — I10 ESSENTIAL HYPERTENSION: ICD-10-CM

## 2024-11-07 DIAGNOSIS — I42.9 CARDIOMYOPATHY, UNSPECIFIED TYPE (MULTI): ICD-10-CM

## 2024-11-07 LAB
AORTIC VALVE MEAN GRADIENT: 6 MMHG
AORTIC VALVE PEAK VELOCITY: 1.55 M/S
ATRIAL RATE: 70 BPM
AV PEAK GRADIENT: 10 MMHG
AVA (PEAK VEL): 1.77 CM2
AVA (VTI): 1.97 CM2
EJECTION FRACTION APICAL 4 CHAMBER: 56.4
EJECTION FRACTION: 63 %
LEFT ATRIUM VOLUME AREA LENGTH INDEX BSA: 29.3 ML/M2
LEFT VENTRICLE INTERNAL DIMENSION DIASTOLE: 4.65 CM (ref 3.5–6)
LEFT VENTRICULAR OUTFLOW TRACT DIAMETER: 2 CM
MITRAL VALVE E/A RATIO: 0.74
P AXIS: 39 DEGREES
P OFFSET: 216 MS
P ONSET: 147 MS
PR INTERVAL: 142 MS
Q ONSET: 218 MS
QRS COUNT: 12 BEATS
QRS DURATION: 94 MS
QT INTERVAL: 414 MS
QTC CALCULATION(BAZETT): 447 MS
QTC FREDERICIA: 436 MS
R AXIS: 26 DEGREES
RIGHT VENTRICLE FREE WALL PEAK S': 10.7 CM/S
RIGHT VENTRICLE PEAK SYSTOLIC PRESSURE: 23.7 MMHG
T AXIS: 27 DEGREES
T OFFSET: 425 MS
TRICUSPID ANNULAR PLANE SYSTOLIC EXCURSION: 2.3 CM
VENTRICULAR RATE: 70 BPM

## 2024-11-07 PROCEDURE — 1159F MED LIST DOCD IN RCRD: CPT | Performed by: INTERNAL MEDICINE

## 2024-11-07 PROCEDURE — 1157F ADVNC CARE PLAN IN RCRD: CPT | Performed by: INTERNAL MEDICINE

## 2024-11-07 PROCEDURE — 4010F ACE/ARB THERAPY RXD/TAKEN: CPT | Performed by: INTERNAL MEDICINE

## 2024-11-07 PROCEDURE — 99214 OFFICE O/P EST MOD 30 MIN: CPT | Performed by: INTERNAL MEDICINE

## 2024-11-07 PROCEDURE — 93306 TTE W/DOPPLER COMPLETE: CPT | Performed by: INTERNAL MEDICINE

## 2024-11-07 PROCEDURE — 99417 PROLNG OP E/M EACH 15 MIN: CPT | Performed by: INTERNAL MEDICINE

## 2024-11-07 PROCEDURE — 3075F SYST BP GE 130 - 139MM HG: CPT | Performed by: INTERNAL MEDICINE

## 2024-11-07 PROCEDURE — 93306 TTE W/DOPPLER COMPLETE: CPT

## 2024-11-07 PROCEDURE — 3051F HG A1C>EQUAL 7.0%<8.0%: CPT | Performed by: INTERNAL MEDICINE

## 2024-11-07 PROCEDURE — 3078F DIAST BP <80 MM HG: CPT | Performed by: INTERNAL MEDICINE

## 2024-11-07 PROCEDURE — 1036F TOBACCO NON-USER: CPT | Performed by: INTERNAL MEDICINE

## 2024-11-07 PROCEDURE — 93005 ELECTROCARDIOGRAM TRACING: CPT | Performed by: INTERNAL MEDICINE

## 2024-11-07 NOTE — PROGRESS NOTES
Chief Complaint:   No chief complaint on file.     History Of Present Illness:    Alycia Denny is a 74 y.o. female presenting for follow-up.  Denies any progressive angina or palpitations.  Having some mild aches and pains, but overall feeling much better than earlier in the year when she was in atrial fibrillation.  Compliant with meds.     Last Recorded Vitals:  Vitals:    11/07/24 1016   BP: 135/76   Pulse: 63   SpO2: 96%   Weight: 96.4 kg (212 lb 8.4 oz)       Past Medical History:  She has a past medical history of Hyperlipidemia, unspecified (11/28/2022), Neuralgia and neuritis, unspecified (05/18/2022), and Type 2 diabetes mellitus without complications (Multi) (11/28/2022).    Past Surgical History:  She has no past surgical history on file.      Social History:  She reports that she has never smoked. She has never used smokeless tobacco. She reports that she does not drink alcohol and does not use drugs.    Family History:  No family history on file.     Allergies:  Guaifenesin-dm, Aspirin, Clarithromycin, Jardiance [empagliflozin], Metformin, and Rosuvastatin    Outpatient Medications:  Current Outpatient Medications   Medication Instructions    albuterol (Ventolin HFA) 90 mcg/actuation inhaler 2 puffs, inhalation, Every 4 hours PRN    benzonatate (TESSALON) 100 mg, oral, 3 times daily PRN, Do not crush or chew.    Eliquis 5 mg, oral, 2 times daily    Farxiga 10 mg, oral, Daily    flash glucose sensor kit (FreeStyle Gabbi 2 Sensor) kit Change the sensor every 14 days    furosemide (LASIX) 40 mg, oral, Daily    ibuprofen 600 mg, 3 times daily    ipratropium (Atrovent) 21 mcg (0.03 %) nasal spray 2 sprays, Each Nostril, 2 times daily PRN    loratadine (CLARITIN) 10 mg, oral, Daily    losartan (COZAAR) 25 mg, oral, Daily    metoprolol tartrate (LOPRESSOR) 50 mg, oral, 2 times daily    Mounjaro 5 mg, subcutaneous, Once Weekly    multivit-min-folic acid-biotin (Women's Multivitamin w-Biotin) 200-300 mcg  tablet,chewable 1 tablet, Daily       Physical Exam:  Constitutional:       Appearance: Healthy appearance. Not in distress.   Neck:      Vascular: No JVR. JVD normal.   Pulmonary:      Effort: Pulmonary effort is normal.      Breath sounds: Normal breath sounds. No wheezing. No rhonchi. No rales.   Chest:      Chest wall: Not tender to palpatation.   Cardiovascular:      Normal rate. Regular rhythm.      Murmurs: There is no murmur.   Edema:     Peripheral edema absent.   Abdominal:      General: Bowel sounds are normal.      Palpations: Abdomen is soft.      Tenderness: There is no abdominal tenderness.   Musculoskeletal: Normal range of motion. Skin:     General: Skin is warm and dry.   Neurological:      General: No focal deficit present.      Mental Status: Alert and oriented to person, place and time.           Last Labs:  CBC -  Lab Results   Component Value Date    WBC 8.2 05/10/2024    HGB 15.0 05/10/2024    HCT 48.2 (H) 05/10/2024    MCV 88 05/10/2024     05/10/2024       CMP -  Lab Results   Component Value Date    CALCIUM 9.7 06/27/2024    PHOS 3.3 04/25/2024    PROT 8.0 05/10/2024    ALBUMIN 4.8 05/10/2024    AST 34 05/10/2024    ALT 41 05/10/2024    ALKPHOS 60 05/10/2024    BILITOT 0.8 05/10/2024       LIPID PANEL -   Lab Results   Component Value Date    CHOL 200 (H) 12/16/2022    TRIG 177 (H) 12/16/2022    HDL 31.3 (A) 12/16/2022    CHHDL 6.4 (A) 12/16/2022    LDLF 133 (H) 12/16/2022    VLDL 35 12/16/2022    NHDL 187 10/07/2021       RENAL FUNCTION PANEL -   Lab Results   Component Value Date    GLUCOSE 98 06/27/2024     06/27/2024    K 3.8 06/27/2024    CL 98 06/27/2024    CO2 27 06/27/2024    ANIONGAP 16 06/27/2024    BUN 27 (H) 06/27/2024    CREATININE 0.90 06/27/2024    CALCIUM 9.7 06/27/2024    PHOS 3.3 04/25/2024    ALBUMIN 4.8 05/10/2024        Lab Results   Component Value Date     (H) 05/10/2024    HGBA1C 7.4 (H) 06/27/2024       Last Cardiology Tests:  ECG independently  reviewed from today: Sinus rhythm, rate 70, nonspecific T wave abnormality     Echo 4/25/2024:   1. Left ventricular systolic function is mildly decreased with a 40-45% estimated ejection fraction.   2. Right ventricular volume overload.   3. There is mildly reduced right ventricular systolic function.   4. Mild to moderately elevated right ventricular systolic pressure.   5. The patient is in atrial fibrillation which may influence the estimate of left ventricular function and transvalvular flows.   6. There is global hypokinesis of the left ventricle with minor regional variations.    Assessment/Plan   Very pleasant 74-year-old female with dyslipidemia, diabetes, hypertension, paroxysmal atrial fibrillation.   Currently in sinus rhythm and euvolemic on exam, blood pressure controlled.  Plan to continue current apixaban, Lasix, Farxiga, metoprolol, and losartan.  Will see her again in 6 months or sooner if needed.      Oscar Rodriguez MD

## 2024-11-11 PROCEDURE — RXMED WILLOW AMBULATORY MEDICATION CHARGE

## 2024-11-12 DIAGNOSIS — I48.91 ATRIAL FIBRILLATION WITH RVR (MULTI): ICD-10-CM

## 2024-11-12 DIAGNOSIS — I50.9 HEART FAILURE, UNSPECIFIED: ICD-10-CM

## 2024-11-13 ENCOUNTER — PHARMACY VISIT (OUTPATIENT)
Dept: PHARMACY | Facility: CLINIC | Age: 74
End: 2024-11-13
Payer: COMMERCIAL

## 2024-11-13 PROCEDURE — RXMED WILLOW AMBULATORY MEDICATION CHARGE

## 2024-11-13 RX ORDER — FUROSEMIDE 40 MG/1
40 TABLET ORAL DAILY
Qty: 30 TABLET | Refills: 3 | Status: SHIPPED | OUTPATIENT
Start: 2024-11-13

## 2024-11-14 ENCOUNTER — PHARMACY VISIT (OUTPATIENT)
Dept: PHARMACY | Facility: CLINIC | Age: 74
End: 2024-11-14
Payer: COMMERCIAL

## 2024-11-18 PROCEDURE — RXMED WILLOW AMBULATORY MEDICATION CHARGE

## 2024-11-19 DIAGNOSIS — I48.91 ATRIAL FIBRILLATION WITH RVR (MULTI): ICD-10-CM

## 2024-11-19 DIAGNOSIS — I50.9 HEART FAILURE, UNSPECIFIED: ICD-10-CM

## 2024-11-19 RX ORDER — FUROSEMIDE 40 MG/1
40 TABLET ORAL
Qty: 60 TABLET | Refills: 3 | Status: SHIPPED | OUTPATIENT
Start: 2024-11-19

## 2024-11-20 ENCOUNTER — PHARMACY VISIT (OUTPATIENT)
Dept: PHARMACY | Facility: CLINIC | Age: 74
End: 2024-11-20
Payer: COMMERCIAL

## 2024-11-26 DIAGNOSIS — E11.65 TYPE 2 DIABETES MELLITUS WITH HYPERGLYCEMIA, WITHOUT LONG-TERM CURRENT USE OF INSULIN: ICD-10-CM

## 2024-11-26 PROCEDURE — RXMED WILLOW AMBULATORY MEDICATION CHARGE

## 2024-11-26 RX ORDER — TIRZEPATIDE 5 MG/.5ML
5 INJECTION, SOLUTION SUBCUTANEOUS
Qty: 2 ML | Refills: 3 | Status: SHIPPED | OUTPATIENT
Start: 2024-11-26

## 2024-11-27 ENCOUNTER — PHARMACY VISIT (OUTPATIENT)
Dept: PHARMACY | Facility: CLINIC | Age: 74
End: 2024-11-27
Payer: COMMERCIAL

## 2024-12-02 ENCOUNTER — PHARMACY VISIT (OUTPATIENT)
Dept: PHARMACY | Facility: CLINIC | Age: 74
End: 2024-12-02
Payer: COMMERCIAL

## 2024-12-02 PROCEDURE — RXMED WILLOW AMBULATORY MEDICATION CHARGE

## 2024-12-03 LAB
ATRIAL RATE: 70 BPM
P AXIS: 39 DEGREES
P OFFSET: 216 MS
P ONSET: 147 MS
PR INTERVAL: 142 MS
Q ONSET: 218 MS
QRS COUNT: 12 BEATS
QRS DURATION: 94 MS
QT INTERVAL: 414 MS
QTC CALCULATION(BAZETT): 447 MS
QTC FREDERICIA: 436 MS
R AXIS: 26 DEGREES
T AXIS: 27 DEGREES
T OFFSET: 425 MS
VENTRICULAR RATE: 70 BPM

## 2024-12-04 ENCOUNTER — APPOINTMENT (OUTPATIENT)
Dept: PRIMARY CARE | Facility: CLINIC | Age: 74
End: 2024-12-04
Payer: MEDICARE

## 2024-12-13 ENCOUNTER — APPOINTMENT (OUTPATIENT)
Dept: PRIMARY CARE | Facility: CLINIC | Age: 74
End: 2024-12-13
Payer: MEDICARE

## 2024-12-17 DIAGNOSIS — I50.9 HEART FAILURE, UNSPECIFIED: ICD-10-CM

## 2024-12-17 DIAGNOSIS — I48.91 ATRIAL FIBRILLATION WITH RVR (MULTI): ICD-10-CM

## 2024-12-17 PROCEDURE — RXMED WILLOW AMBULATORY MEDICATION CHARGE

## 2024-12-19 ENCOUNTER — PHARMACY VISIT (OUTPATIENT)
Dept: PHARMACY | Facility: CLINIC | Age: 74
End: 2024-12-19
Payer: COMMERCIAL

## 2024-12-19 PROCEDURE — RXMED WILLOW AMBULATORY MEDICATION CHARGE

## 2024-12-19 RX ORDER — LOSARTAN POTASSIUM 25 MG/1
25 TABLET ORAL DAILY
Qty: 30 TABLET | Refills: 1 | Status: SHIPPED | OUTPATIENT
Start: 2024-12-19

## 2024-12-23 ENCOUNTER — PHARMACY VISIT (OUTPATIENT)
Dept: PHARMACY | Facility: CLINIC | Age: 74
End: 2024-12-23
Payer: COMMERCIAL

## 2024-12-30 ENCOUNTER — TELEPHONE (OUTPATIENT)
Dept: ENDOCRINOLOGY | Facility: CLINIC | Age: 74
End: 2024-12-30
Payer: MEDICARE

## 2024-12-30 PROCEDURE — RXMED WILLOW AMBULATORY MEDICATION CHARGE

## 2025-01-03 ENCOUNTER — PHARMACY VISIT (OUTPATIENT)
Dept: PHARMACY | Facility: CLINIC | Age: 75
End: 2025-01-03
Payer: COMMERCIAL

## 2025-01-13 PROCEDURE — RXMED WILLOW AMBULATORY MEDICATION CHARGE

## 2025-01-14 ENCOUNTER — PHARMACY VISIT (OUTPATIENT)
Dept: PHARMACY | Facility: CLINIC | Age: 75
End: 2025-01-14
Payer: COMMERCIAL

## 2025-01-17 DIAGNOSIS — E11.65 TYPE 2 DIABETES MELLITUS WITH HYPERGLYCEMIA, WITHOUT LONG-TERM CURRENT USE OF INSULIN: ICD-10-CM

## 2025-01-17 PROCEDURE — RXMED WILLOW AMBULATORY MEDICATION CHARGE

## 2025-01-17 RX ORDER — FLASH GLUCOSE SENSOR
KIT MISCELLANEOUS
Qty: 6 EACH | Refills: 3 | Status: SHIPPED | OUTPATIENT
Start: 2025-01-17

## 2025-01-21 ENCOUNTER — PHARMACY VISIT (OUTPATIENT)
Dept: PHARMACY | Facility: CLINIC | Age: 75
End: 2025-01-21
Payer: COMMERCIAL

## 2025-02-05 DIAGNOSIS — T78.40XA ALLERGY, INITIAL ENCOUNTER: ICD-10-CM

## 2025-02-06 RX ORDER — BENZONATATE 100 MG/1
100 CAPSULE ORAL 3 TIMES DAILY PRN
Qty: 42 CAPSULE | Refills: 3 | Status: SHIPPED | OUTPATIENT
Start: 2025-02-06 | End: 2025-08-05

## 2025-02-11 DIAGNOSIS — I48.91 ATRIAL FIBRILLATION WITH RVR (MULTI): Primary | ICD-10-CM

## 2025-02-11 DIAGNOSIS — E11.9 TYPE 2 DIABETES MELLITUS WITHOUT COMPLICATION, UNSPECIFIED WHETHER LONG TERM INSULIN USE (MULTI): ICD-10-CM

## 2025-02-11 DIAGNOSIS — I50.9 CONGESTIVE HEART FAILURE, UNSPECIFIED HF CHRONICITY, UNSPECIFIED HEART FAILURE TYPE: ICD-10-CM

## 2025-02-12 ENCOUNTER — TELEMEDICINE (OUTPATIENT)
Dept: PHARMACY | Facility: HOSPITAL | Age: 75
End: 2025-02-12
Payer: MEDICARE

## 2025-02-12 ENCOUNTER — TELEPHONE (OUTPATIENT)
Dept: PRIMARY CARE | Facility: CLINIC | Age: 75
End: 2025-02-12

## 2025-02-12 DIAGNOSIS — R05.2 SUBACUTE COUGH: ICD-10-CM

## 2025-02-12 DIAGNOSIS — E11.65 TYPE 2 DIABETES MELLITUS WITH HYPERGLYCEMIA, WITHOUT LONG-TERM CURRENT USE OF INSULIN: ICD-10-CM

## 2025-02-12 DIAGNOSIS — I48.91 ATRIAL FIBRILLATION WITH RVR (MULTI): ICD-10-CM

## 2025-02-12 DIAGNOSIS — E11.9 TYPE 2 DIABETES MELLITUS WITHOUT COMPLICATION, UNSPECIFIED WHETHER LONG TERM INSULIN USE (MULTI): ICD-10-CM

## 2025-02-12 DIAGNOSIS — I50.9 CONGESTIVE HEART FAILURE, UNSPECIFIED HF CHRONICITY, UNSPECIFIED HEART FAILURE TYPE: ICD-10-CM

## 2025-02-12 DIAGNOSIS — I50.9 HEART FAILURE, UNSPECIFIED: ICD-10-CM

## 2025-02-12 PROCEDURE — RXMED WILLOW AMBULATORY MEDICATION CHARGE

## 2025-02-12 RX ORDER — LOSARTAN POTASSIUM 25 MG/1
25 TABLET ORAL DAILY
Qty: 90 TABLET | Refills: 3 | Status: SHIPPED | OUTPATIENT
Start: 2025-02-12

## 2025-02-12 NOTE — PROGRESS NOTES
"Clinical Pharmacy Visit    Lexis Denny \"Alycia\" is a 74 y.o. female.  The patient was referred for their Afib, CHF and diabetes management. This visit is also to discuss re-enrollment for the patient assistance program.       Referring Provider: Milton Baum MD  PCP: Miguel Angel Clark, DO - last visit: 9/3, next visit: 8/4      Subjective   Allergies   Allergen Reactions    Guaifenesin-Dm Cardiac arrhythmia/arrest and Anxiety    Aspirin Other    Clarithromycin Other    Jardiance [Empagliflozin] Other     dehydrated    Metformin GI Upset    Rosuvastatin Unknown       [id number] - [place], [place] - [address] [facility]  [address] [facility] [address]  Phone: [phone] Fax: [phone]    [facility]  [address], [address]  [facility] [address]  Phone: [phone] Fax: [phone]    [id number] - [place], [place] - [address] [address]  [address] [facility] [address]  Phone: [phone] Fax: [phone]    [facility]  [address] [facility] [address]  Phone: [phone] Fax: [phone]      Medication System Management:  Affordability/Accessibility: trying to renew PAP  Adherence/Organization: no issues       Social History     Social History Narrative    Not on file          HPI      Review of Systems        Objective     There were no vitals taken for this visit.   BP Readings from Last 4 Encounters:   11/07/24 135/76   09/03/24 134/85   05/29/24 134/78   05/28/24 123/80      There were no vitals filed for this visit.     LAB  Lab Results   Component Value Date    BILITOT 0.8 05/10/2024    CALCIUM 9.7 06/27/2024    CO2 27 06/27/2024    CL 98 06/27/2024    CREATININE 0.90 06/27/2024    GLUCOSE 98 06/27/2024    ALKPHOS 60 05/10/2024    K 3.8 06/27/2024    PROT 8.0 05/10/2024     06/27/2024    AST 34 05/10/2024    ALT 41 05/10/2024    BUN 27 (H) 06/27/2024    ANIONGAP 16 06/27/2024    MG 2.10 05/10/2024    PHOS 3.3 " 04/25/2024    ALBUMIN 4.8 05/10/2024    GFRF 80 06/05/2023     Lab Results   Component Value Date    TRIG 177 (H) 12/16/2022    CHOL 200 (H) 12/16/2022    HDL 31.3 (A) 12/16/2022     Lab Results   Component Value Date    HGBA1C 7.4 (H) 06/27/2024         Current Outpatient Medications   Medication Instructions    albuterol (Ventolin HFA) 90 mcg/actuation inhaler 2 puffs, inhalation, Every 4 hours PRN    benzonatate (TESSALON) 100 mg, oral, 3 times daily PRN, Do not crush or chew.    Eliquis 5 mg, oral, 2 times daily    Farxiga 10 mg, oral, Daily    flash glucose sensor kit (FreeStyle Agbbi 2 Sensor) kit Change the sensor every 14 days    furosemide (LASIX) 40 mg, oral, 2 times daily (morning and late afternoon)    ibuprofen 600 mg, 3 times daily    ipratropium (Atrovent) 21 mcg (0.03 %) nasal spray 2 sprays, Each Nostril, 2 times daily PRN    loratadine (CLARITIN) 10 mg, oral, Daily    losartan (COZAAR) 25 mg, oral, Daily    metoprolol tartrate (LOPRESSOR) 50 mg, oral, 2 times daily    Mounjaro 5 mg, subcutaneous, Once Weekly    multivit-min-folic acid-biotin (Women's Multivitamin w-Biotin) 200-300 mcg tablet,chewable 1 tablet, Daily            Assessment/Plan   Problem List Items Addressed This Visit       Diabetes mellitus, type 2 (Multi)    Atrial fibrillation with RVR (Multi)     Other Visit Diagnoses       Congestive heart failure, unspecified HF chronicity, unspecified heart failure type              DM   Most recent A1c was 7.4% which is improved (6/27/24)  She wears CGM to monitor sugars - using reader so not able to see data remotely   Does need refills on sensors   Continuing with Mounjaro 5mg once weekly   Also continue with Farxiga 10mg daily   Afib   No feelings of palpitations and not having any chest pain  Continue metoprolol and eliquis   Eliquis still dosed appropriately at 5mg twice daily based on age, weight and kidney function   CHF   Most recent EF was 60-65% from 11/7/24  Continue GDMT -->  metoprolol, farxiga and losartan   Needs refill on losartan   Continue lasix daily   Continue with daily weights and BP's     PAP:  -confirmed that her and her spouse only received SS for annual income form last year  -going to send me copies of both of their SS benefit letters  -once received, I will submit for possible re-enrollment     Follow Up: as needed     Continue all meds under the continuation of care with the referring provider and clinical pharmacy team.    Tushar Caceres, [place]     Verbal consent to manage patient's drug therapy was obtained from the patient. They were informed they may decline to participate or withdraw from participation in pharmacy services at any time.

## 2025-02-12 NOTE — TELEPHONE ENCOUNTER
Pt states that Dr. Cordoba wants her to be on thyroid meds due to her TSH came back mildly elevated. She has been tired with inability to lose weight,

## 2025-02-13 ENCOUNTER — PHARMACY VISIT (OUTPATIENT)
Dept: PHARMACY | Facility: CLINIC | Age: 75
End: 2025-02-13

## 2025-02-13 DIAGNOSIS — R23.2 HOT FLASHES: Primary | ICD-10-CM

## 2025-02-13 PROCEDURE — RXMED WILLOW AMBULATORY MEDICATION CHARGE

## 2025-02-13 RX ORDER — TIRZEPATIDE 5 MG/.5ML
5 INJECTION, SOLUTION SUBCUTANEOUS
Qty: 6 ML | Refills: 3 | Status: SHIPPED | OUTPATIENT
Start: 2025-02-16

## 2025-02-13 RX ORDER — ALBUTEROL SULFATE 90 UG/1
2 INHALANT RESPIRATORY (INHALATION) EVERY 6 HOURS PRN
Qty: 54 G | Refills: 3 | Status: SHIPPED | OUTPATIENT
Start: 2025-02-13 | End: 2025-03-15

## 2025-02-13 RX ORDER — DAPAGLIFLOZIN 10 MG/1
10 TABLET, FILM COATED ORAL DAILY
Qty: 90 TABLET | Refills: 3 | Status: SHIPPED | OUTPATIENT
Start: 2025-02-13

## 2025-02-14 ENCOUNTER — PHARMACY VISIT (OUTPATIENT)
Dept: PHARMACY | Facility: CLINIC | Age: 75
End: 2025-02-14
Payer: COMMERCIAL

## 2025-02-17 LAB
METANEPH FREE SERPL-MCNC: <25 PG/ML
METANEPHS SERPL-MCNC: 66 PG/ML
NORMETANEPH FREE SERPL-MCNC: 66 PG/ML

## 2025-02-18 PROCEDURE — RXMED WILLOW AMBULATORY MEDICATION CHARGE

## 2025-02-19 ENCOUNTER — PHARMACY VISIT (OUTPATIENT)
Dept: PHARMACY | Facility: CLINIC | Age: 75
End: 2025-02-19
Payer: COMMERCIAL

## 2025-02-19 PROCEDURE — RXMED WILLOW AMBULATORY MEDICATION CHARGE

## 2025-02-20 ENCOUNTER — PHARMACY VISIT (OUTPATIENT)
Dept: PHARMACY | Facility: CLINIC | Age: 75
End: 2025-02-20
Payer: COMMERCIAL

## 2025-02-20 PROCEDURE — RXMED WILLOW AMBULATORY MEDICATION CHARGE

## 2025-02-25 ENCOUNTER — PHARMACY VISIT (OUTPATIENT)
Dept: PHARMACY | Facility: CLINIC | Age: 75
End: 2025-02-25
Payer: COMMERCIAL

## 2025-03-06 PROCEDURE — RXMED WILLOW AMBULATORY MEDICATION CHARGE

## 2025-03-07 ENCOUNTER — PHARMACY VISIT (OUTPATIENT)
Dept: PHARMACY | Facility: CLINIC | Age: 75
End: 2025-03-07
Payer: COMMERCIAL

## 2025-03-10 PROCEDURE — RXMED WILLOW AMBULATORY MEDICATION CHARGE

## 2025-03-14 ENCOUNTER — PHARMACY VISIT (OUTPATIENT)
Dept: PHARMACY | Facility: CLINIC | Age: 75
End: 2025-03-14
Payer: COMMERCIAL

## 2025-03-20 PROCEDURE — RXMED WILLOW AMBULATORY MEDICATION CHARGE

## 2025-03-21 ENCOUNTER — PHARMACY VISIT (OUTPATIENT)
Dept: PHARMACY | Facility: CLINIC | Age: 75
End: 2025-03-21
Payer: COMMERCIAL

## 2025-03-21 DIAGNOSIS — I48.91 ATRIAL FIBRILLATION WITH RVR (MULTI): ICD-10-CM

## 2025-03-21 DIAGNOSIS — I50.9 HEART FAILURE, UNSPECIFIED: ICD-10-CM

## 2025-03-21 RX ORDER — FUROSEMIDE 40 MG/1
TABLET ORAL
Qty: 180 TABLET | Refills: 1 | Status: SHIPPED | OUTPATIENT
Start: 2025-03-21

## 2025-04-01 DIAGNOSIS — T78.40XA ALLERGY, INITIAL ENCOUNTER: ICD-10-CM

## 2025-04-02 RX ORDER — BENZONATATE 100 MG/1
100 CAPSULE ORAL 3 TIMES DAILY PRN
Qty: 42 CAPSULE | Refills: 3 | Status: SHIPPED | OUTPATIENT
Start: 2025-04-02 | End: 2025-09-29

## 2025-04-09 ENCOUNTER — OFFICE VISIT (OUTPATIENT)
Facility: CLINIC | Age: 75
End: 2025-04-09
Payer: MEDICARE

## 2025-04-09 VITALS
BODY MASS INDEX: 37.22 KG/M2 | DIASTOLIC BLOOD PRESSURE: 84 MMHG | HEIGHT: 64 IN | WEIGHT: 218 LBS | SYSTOLIC BLOOD PRESSURE: 138 MMHG

## 2025-04-09 DIAGNOSIS — N95.0 POST-MENOPAUSAL BLEEDING: ICD-10-CM

## 2025-04-09 DIAGNOSIS — Z01.419 WELL WOMAN EXAM WITH ROUTINE GYNECOLOGICAL EXAM: ICD-10-CM

## 2025-04-09 DIAGNOSIS — Z12.4 ENCOUNTER FOR SCREENING FOR CERVICAL CANCER: ICD-10-CM

## 2025-04-09 DIAGNOSIS — Z78.0 POSTMENOPAUSE: Primary | ICD-10-CM

## 2025-04-09 PROCEDURE — 87624 HPV HI-RISK TYP POOLED RSLT: CPT

## 2025-04-09 RX ORDER — ASCORBIC ACID 125 MG
TABLET,CHEWABLE ORAL
COMMUNITY

## 2025-04-09 ASSESSMENT — ENCOUNTER SYMPTOMS
COUGH: 0
VOMITING: 0
FEVER: 0
DIARRHEA: 0
ABDOMINAL PAIN: 0
FATIGUE: 0
SHORTNESS OF BREATH: 0
CONSTIPATION: 0
DIZZINESS: 0
PALPITATIONS: 0
LIGHT-HEADEDNESS: 0
NAUSEA: 0

## 2025-04-09 NOTE — PROGRESS NOTES
"Subjective   Patient ID: Lexis Denny \"Alycia\" is a 75 y.o. female who presents for Annual Exam (Pt here for annual exam./Pt reports bleeding on a medication. /Pt reports vaginal dryness & Hotflashes./).  HPI    76 y/o presents to office for well women exam.   Lives in [place], , occasionally sexually active.  52 year daughter with 2 granddaughters  Menopause since 2002, admits to vaginal bleeding which started 1 year ago. Has not been evaluated, thought it was dryness or r/t medication.   C/o facial redness and warmth started 3-4 years ago.   Mammogram and pap a many years ago.        Review of Systems   Constitutional:  Negative for fatigue and fever.   Respiratory:  Negative for cough and shortness of breath.    Cardiovascular:  Negative for chest pain and palpitations.   Gastrointestinal:  Negative for abdominal pain, constipation, diarrhea, nausea and vomiting.   Endocrine: Negative for cold intolerance and heat intolerance.   Genitourinary:  Negative for dyspareunia, pelvic pain, vaginal discharge and vaginal pain.   Neurological:  Negative for dizziness and light-headedness.       Objective   Physical Exam  Vitals reviewed.   Constitutional:       Appearance: Normal appearance.   Neck:      Thyroid: No thyroid mass, thyromegaly or thyroid tenderness.   Cardiovascular:      Rate and Rhythm: Normal rate and regular rhythm.      Heart sounds: Normal heart sounds.   Pulmonary:      Effort: Pulmonary effort is normal.      Breath sounds: Wheezing present.   Chest:   Breasts:     Right: Normal. No mass.      Left: Normal. No mass.   Abdominal:      General: Bowel sounds are normal.      Palpations: Abdomen is soft.      Tenderness: There is no abdominal tenderness.   Genitourinary:     General: Normal vulva.      Vagina: Normal.      Cervix: Discharge present.      Uterus: Normal. Enlarged.       Adnexa: Right adnexa normal.      Comments: Vaginal bleeding  Musculoskeletal:         General: Normal range of " motion.      Cervical back: No tenderness.   Skin:     General: Skin is warm.   Neurological:      General: No focal deficit present.      Mental Status: She is alert and oriented to person, place, and time.   Psychiatric:         Attention and Perception: Attention normal.         Behavior: Behavior normal.         Assessment/Plan   Diagnoses and all orders for this visit:  Postmenopause  Post-menopausal bleeding  -     US PELVIS TRANSABDOMINAL WITH TRANSVAGINAL; Future  Well woman exam with routine gynecological exam  Encounter for screening for cervical cancer  -     THINPREP PAP TEST (>30)  -     HPV DNA High Risk With Genotype  - Reviewed healthy eating habits and exercise. Recommended 30 min a day at least 3 days a week including weight bearing exercise.   - Recommended self breast exam  - RTO for EMB for PMB       AUSTIN Gamboa 04/11/25 4:04 PM

## 2025-04-17 DIAGNOSIS — I48.91 ATRIAL FIBRILLATION WITH RVR (MULTI): ICD-10-CM

## 2025-04-17 DIAGNOSIS — I50.9 HEART FAILURE, UNSPECIFIED: ICD-10-CM

## 2025-04-18 PROCEDURE — RXMED WILLOW AMBULATORY MEDICATION CHARGE

## 2025-04-18 RX ORDER — METOPROLOL TARTRATE 50 MG/1
50 TABLET ORAL 2 TIMES DAILY
Qty: 180 TABLET | Refills: 1 | Status: SHIPPED | OUTPATIENT
Start: 2025-04-18

## 2025-04-19 ENCOUNTER — HOSPITAL ENCOUNTER (OUTPATIENT)
Dept: RADIOLOGY | Facility: HOSPITAL | Age: 75
Discharge: HOME | End: 2025-04-19
Payer: MEDICARE

## 2025-04-19 DIAGNOSIS — N95.0 POST-MENOPAUSAL BLEEDING: ICD-10-CM

## 2025-04-19 PROCEDURE — 76830 TRANSVAGINAL US NON-OB: CPT | Performed by: RADIOLOGY

## 2025-04-19 PROCEDURE — 76830 TRANSVAGINAL US NON-OB: CPT

## 2025-04-19 PROCEDURE — 76856 US EXAM PELVIC COMPLETE: CPT | Performed by: RADIOLOGY

## 2025-04-22 ENCOUNTER — PHARMACY VISIT (OUTPATIENT)
Dept: PHARMACY | Facility: CLINIC | Age: 75
End: 2025-04-22
Payer: COMMERCIAL

## 2025-04-22 ENCOUNTER — TELEPHONE (OUTPATIENT)
Facility: CLINIC | Age: 75
End: 2025-04-22
Payer: MEDICARE

## 2025-04-22 NOTE — TELEPHONE ENCOUNTER
Reviewed thickened endometrial lining and need for EMB. Patient scheduled.       Result Communication    Resulted Orders   US PELVIS TRANSABDOMINAL WITH TRANSVAGINAL    Narrative    Interpreted By:  Norbert Adams,   STUDY:  US PELVIS TRANSABDOMINAL WITH TRANSVAGINAL;  4/19/2025 3:44 pm      INDICATION:  Signs/Symptoms:PMB.      COMPARISON:  None.      ACCESSION NUMBER(S):  [id number]      ORDERING CLINICIAN:  ANDREA ENNIS      TECHNIQUE:  Multiple multiplanar static gray scale, color and spectral waveform  sonographic images of the pelvis were obtained.  Transabdominal and  transvaginal ultrasound was performed.      FINDINGS:  UTERUS:  The uterus measures 8.3 x 5.4 x 9.6 cm. No uterine masses.      ENDOMETRIUM:  The endometrium measures 2.4 cm. Heterogeneous with cystic change.      RIGHT OVARY:  Obscured by overlying bowel gas.      LEFT OVARY:  Obscured by overlying bowel gas.      OTHER:  No significant pelvic free fluid.        Impression    Abnormal thickening and heterogeneity of the endometrium. Recommend  tissue sampling to assess for endometrial hyperplasia versus  carcinoma. Yellow Alert.      Ovaries obscured.      Critical Finding:  See findings. Notification was initiated on  4/21/2025 at 6:36 pm by  Norbert Adams.  (**-YCF-**) Instructions:      Signed by: Norbert Adams 4/21/2025 6:36 PM  Dictation workstation:   [id number]       2:36 PM      Results were successfully communicated with the patient and they acknowledged their understanding.

## 2025-04-23 LAB
CYTOLOGY CMNT CVX/VAG CYTO-IMP: NORMAL
HPV HR 12 DNA GENITAL QL NAA+PROBE: NEGATIVE
HPV HR GENOTYPES PNL CVX NAA+PROBE: NEGATIVE
HPV16 DNA SPEC QL NAA+PROBE: NEGATIVE
HPV18 DNA SPEC QL NAA+PROBE: NEGATIVE
LAB AP HPV GENOTYPE QUESTION: YES
LAB AP HPV HR: NORMAL
LABORATORY COMMENT REPORT: NORMAL
LABORATORY COMMENT REPORT: NORMAL
PATH REPORT.TOTAL CANCER: NORMAL

## 2025-05-05 PROCEDURE — RXMED WILLOW AMBULATORY MEDICATION CHARGE

## 2025-05-07 ENCOUNTER — PHARMACY VISIT (OUTPATIENT)
Dept: PHARMACY | Facility: CLINIC | Age: 75
End: 2025-05-07
Payer: COMMERCIAL

## 2025-05-07 PROCEDURE — RXMED WILLOW AMBULATORY MEDICATION CHARGE

## 2025-05-09 ENCOUNTER — PHARMACY VISIT (OUTPATIENT)
Dept: PHARMACY | Facility: CLINIC | Age: 75
End: 2025-05-09
Payer: COMMERCIAL

## 2025-05-10 ENCOUNTER — PHARMACY VISIT (OUTPATIENT)
Dept: PHARMACY | Facility: CLINIC | Age: 75
End: 2025-05-10
Payer: COMMERCIAL

## 2025-05-13 ENCOUNTER — OFFICE VISIT (OUTPATIENT)
Dept: NEUROLOGY | Facility: HOSPITAL | Age: 75
End: 2025-05-13
Payer: MEDICARE

## 2025-05-13 ENCOUNTER — PATIENT MESSAGE (OUTPATIENT)
Dept: ENDOCRINOLOGY | Facility: CLINIC | Age: 75
End: 2025-05-13

## 2025-05-13 VITALS
HEART RATE: 71 BPM | WEIGHT: 216.7 LBS | BODY MASS INDEX: 37.2 KG/M2 | SYSTOLIC BLOOD PRESSURE: 173 MMHG | DIASTOLIC BLOOD PRESSURE: 85 MMHG

## 2025-05-13 DIAGNOSIS — M79.671 PAIN IN BOTH FEET: Primary | ICD-10-CM

## 2025-05-13 DIAGNOSIS — M79.672 PAIN IN BOTH FEET: Primary | ICD-10-CM

## 2025-05-13 DIAGNOSIS — I73.9 CLAUDICATION OF BOTH LOWER EXTREMITIES: ICD-10-CM

## 2025-05-13 PROCEDURE — 99215 OFFICE O/P EST HI 40 MIN: CPT | Performed by: PSYCHIATRY & NEUROLOGY

## 2025-05-13 PROCEDURE — 1159F MED LIST DOCD IN RCRD: CPT | Performed by: PSYCHIATRY & NEUROLOGY

## 2025-05-13 PROCEDURE — 3079F DIAST BP 80-89 MM HG: CPT | Performed by: PSYCHIATRY & NEUROLOGY

## 2025-05-13 PROCEDURE — 1160F RVW MEDS BY RX/DR IN RCRD: CPT | Performed by: PSYCHIATRY & NEUROLOGY

## 2025-05-13 PROCEDURE — 1036F TOBACCO NON-USER: CPT | Performed by: PSYCHIATRY & NEUROLOGY

## 2025-05-13 PROCEDURE — 4010F ACE/ARB THERAPY RXD/TAKEN: CPT | Performed by: PSYCHIATRY & NEUROLOGY

## 2025-05-13 PROCEDURE — 1125F AMNT PAIN NOTED PAIN PRSNT: CPT | Performed by: PSYCHIATRY & NEUROLOGY

## 2025-05-13 PROCEDURE — 3077F SYST BP >= 140 MM HG: CPT | Performed by: PSYCHIATRY & NEUROLOGY

## 2025-05-13 PROCEDURE — G2211 COMPLEX E/M VISIT ADD ON: HCPCS | Performed by: PSYCHIATRY & NEUROLOGY

## 2025-05-13 ASSESSMENT — PAIN SCALES - GENERAL: PAINLEVEL_OUTOF10: 7

## 2025-05-13 ASSESSMENT — ENCOUNTER SYMPTOMS
LOSS OF SENSATION IN FEET: 0
OCCASIONAL FEELINGS OF UNSTEADINESS: 1
DEPRESSION: 0

## 2025-05-13 NOTE — PATIENT INSTRUCTIONS
Do BLE arterial PVR  Control your thyroid and diabetes--find a PCP or see an endocrinologist  3.   Do EMG/NCT BLE--pt wants to defer  4.   Discussed poss use of neuropathic meds--pt wants to defer    Rtc pending above. May not necessarily need to see neurology if happy with just ibuprofen and not wanting work-up/symptomatic management.

## 2025-05-13 NOTE — PROGRESS NOTES
"In-clinic visit    Visit type: provider referral: Dr Cordoba    PCP: Miguel Angel Clark DO.    Subjective     Lexis Denny is a 75 y.o. year old right handed female who presents with neuropathy and insomnia.    Patient is accompanied by none.     HPI    Prev pt of Dr Cordoba (neurologist) who had retired.    BP high today, states white coat syndrome.    Carries a diagnosis of neuropathy. First seen by Dr Cordoba 2021 for painful neuropathy of feet. Diabetic. Also has arthritis \"in many places\" including hands and feet. She describes no numbness and tingling, but pain in toes and bottom of feet. Skin also feels tight. No EMG/NCT done in past. Puts vaseline in feet all the time. Uses cold water in feet. Not pins sticking. Not ants crawling sensation. If moving toes, toes hurt more. Has wide foot and describes difficulty finding a good shoe. UE--has pain, no numbness/tingling. Has arthritis.  Diabetic. Was seeing pain doctor in past, who apparently tried topiramate and GBP, both of which \"didn't help\". When last seen by Dr Cordoba, pt only taking ibuprofen for arthritis (which helps with feet pain as well) and didn't really want to try other meds and she was content with that.    On Eliquis, for afib. Took herself off Farxiga--SE.    No EMG/NCT--not wanting done    8/2024 B12 ok  8/2024 TSH high   6/2024 Hba1c 7.4<--11.9 in 2022    High B6 in past--stopped  TSH abnormal    States saw PCP who \"did not do anything with her thyroid lab abnormality\". She sees endocrinology for her diabetes.    No smoking. No alcohol. No street drug use.    Review of Systems   Constitutional:  Negative for appetite change, chills and fever.   HENT:  Negative for ear pain and nosebleeds.    Eyes:  Negative for discharge and itching.   Respiratory:  Negative for choking and chest tightness.    Cardiovascular:  Negative for chest pain and palpitations.   Gastrointestinal:  Negative for abdominal distention, abdominal pain and nausea.   Endocrine: Negative " "for cold intolerance and heat intolerance.   Genitourinary:  Negative for difficulty urinating and dysuria.   Musculoskeletal:  Negative for gait problem and myalgias.   Neurological:  Negative for dizziness, seizures.     Problem List[1]    Medical History[2]  Surgical History[3]  Social History     Tobacco Use    Smoking status: Never    Smokeless tobacco: Never   Substance Use Topics    Alcohol use: Never     family history is not on file.    Allergies[4]  Current Medications[5]    Objective     Wt 98.3 kg (216 lb 11.2 oz)   BMI 37.20 kg/m²     Awake, alert, oriented x3, in no distress  Well-nourished, ambulatory independently  No leg edema    Poor pedal pulses bilat  BLE purplish, with \"vaseline\"  Taut skin    Mental status exam as above, conversant   Fair fund of knowledge  Recent/remote memory fair  Fair attention span  Pupils round reactive to light, 3-4 mm, (-) RAPD   Fundoscopic examination was attempted but fundus was not visualized bilaterally   Full EOMs intact, no nystagmus, no ptosis   V1 to V3 sensation is intact   No facial droop   Hearing grossly intact   No dysarthria  Good shoulder shrug bilaterally   Tongue is midline     Motor strength is 5/5 on all extremities, tone/bulk normal   Reflexes 1+ on all 4 extremities except trace on B ankles, downgoing toes bilaterally   Sensation is intact to light touch, vibration on all 4 extremities except dec vib BLE  Finger to nose test intact bilaterally   Negative Romberg sign   Normal gait       Lab Results   Component Value Date    WBC 8.2 05/10/2024    RBC 5.48 (H) 05/10/2024    HGB 15.0 05/10/2024    HCT 48.2 (H) 05/10/2024     05/10/2024     06/27/2024    K 3.8 06/27/2024    CL 98 06/27/2024    BUN 27 (H) 06/27/2024    CREATININE 0.90 06/27/2024    EGFR 67 06/27/2024    CALCIUM 9.7 06/27/2024    ALKPHOS 60 05/10/2024    AST 34 05/10/2024    ALT 41 05/10/2024    MG 2.10 05/10/2024    RTLVIGHT18 435 08/20/2024    VITD25 37 12/16/2022    " HGBA1C 7.4 (H) 06/27/2024    CHOL 200 (H) 12/16/2022    HDL 31.3 (A) 12/16/2022    TRIG 177 (H) 12/16/2022    TSH 4.78 (H) 08/20/2024        Assessment/Plan     Bilateral feet pain  Stated hx neuropathy  Diabetes type 2  4.    Arthritis  Discussed with pt, B feet pain could be from anything, including arthritis. She has no numbness/tingling, just pain.  Ddx: arthritis, +/- neuropathy (? Large fiber vs small fiber), +/- vascular claudication vs other  Discussed, not all diabetics have neuropathy, and not all neuropathic pains in diabetics are due to diabetes and neuropathy  Pt has had no EMG/NCT--not wanting to do  We discussed about symptomatic treatment--she is not wanting controlled substances (which is reasonable)  S/p topiramate  S/p GBP  Discussed, symptomatic management with meds is optional and up to her if she wants to try--won't harm her if she chooses not to try, but will have to live with symptoms  Will check for arterial claudication--poor pulses, purplish BLE, but BLE not cool  Abnormal TSH    Plans:  Do BLE arterial PVR  Control your thyroid and diabetes--find a PCP or see an endocrinologist  3.   Do EMG/NCT BLE--pt wants to defer  4.   Discussed poss use of neuropathic meds--pt wants to defer    Rtc pending above. May not necessarily need to see neurology if happy with just ibuprofen and not wanting work-up/symptomatic management.    All questions were answered.  Pt knows how to contact my office in case pt has any questions or concerns.    Josh Smith MD            [1]   Patient Active Problem List  Diagnosis    Acute hypersomnolence disorder    Insomnia    Non-restorative sleep    Arthralgia of multiple joints    Asthma    Sleep apnea    Bilateral knee pain    Bruising    Cellulitis    Change in mental status    Chronic vasomotor rhinitis    Cough    Daytime sleepiness    Diabetes mellitus, type 2 (Multi)    Elevated rheumatoid factor    Essential hypertension    Shortness of breath at rest     Generalized weakness    Hair loss    Hyperlipidemia    Mental distress    OA (osteoarthritis) of hip    OA (osteoarthritis) of knee    Obesity (BMI 30-39.9)    Otalgia    Painful diabetic neuropathy (Multi)    Foot pain    Neuropathic pain of lower extremity    Pedal edema    Peripheral neuropathy    Breast mass    Mass of lung    Pulmonary nodule    Sinusitis    Transaminitis    Type 2 diabetes mellitus with hyperglycemia (Multi)    Venous stasis    Vitamin D deficiency    Sinus symptom    Atrial fibrillation with rapid ventricular response (Multi)    Atrial fibrillation with RVR (Multi)    Dyspnea on exertion    GUMARO (obstructive sleep apnea)    Anticoagulant long-term use    Routine general medical examination at health care facility    Morbid (severe) obesity due to excess calories (Multi)   [2]   Past Medical History:  Diagnosis Date    Arthritis     Hyperlipidemia, unspecified 11/28/2022    Hyperlipidemia    Neuralgia and neuritis, unspecified 05/18/2022    Neuropathic pain of lower extremity, unspecified laterality    Type 2 diabetes mellitus without complications 11/28/2022    Diabetes mellitus, type 2   [3] No past surgical history on file.  [4]   Allergies  Allergen Reactions    Guaifenesin-Dm Cardiac arrhythmia/arrest and Anxiety    Aspirin Other    Clarithromycin Other    Jardiance [Empagliflozin] Other     dehydrated    Metformin GI Upset    Rosuvastatin Unknown   [5]   Current Outpatient Medications:     apixaban (Eliquis) 5 mg tablet, Take 1 tablet (5 mg) by mouth 2 times a day., Disp: 180 tablet, Rfl: 3    benzonatate (Tessalon) 100 mg capsule, Take 1 capsule (100 mg) by mouth 3 times a day as needed for cough. Do not crush or chew., Disp: 42 capsule, Rfl: 3    biotin 5,000 mcg tablet,chewable, Chew., Disp: , Rfl:     flash glucose sensor kit (FreeStyle Gabbi 2 Sensor) kit, Change the sensor every 14 days, Disp: 6 each, Rfl: 3    furosemide (Lasix) 40 mg tablet, TAKE ONE TABLET BY MOUTH TWO TIMES A  DAY MORNING AND LATE AFTERNOON, Disp: 180 tablet, Rfl: 1    ibuprofen 200 mg tablet, Take 3 tablets (600 mg) by mouth 3 times a day., Disp: , Rfl:     metoprolol tartrate (Lopressor) 50 mg tablet, Take 1 tablet by mouth 2 times a day., Disp: 180 tablet, Rfl: 1    multivit-min-folic acid-biotin (Women's Multivitamin w-Biotin) 200-300 mcg tablet,chewable, Chew 1 tablet once daily., Disp: , Rfl:     tirzepatide (Mounjaro) 5 mg/0.5 mL pen injector, Inject 5 mg under the skin 1 (one) time per week., Disp: 6 mL, Rfl: 3    albuterol (Ventolin HFA) 90 mcg/actuation inhaler, Inhale 2 puffs every 6 hours if needed for wheezing or shortness of breath. (Patient not taking: Reported on 5/13/2025), Disp: 54 g, Rfl: 3    dapagliflozin propanediol (Farxiga) 10 mg, Take 1 tablet (10 mg) by mouth once daily. (Patient not taking: Reported on 5/13/2025), Disp: 90 tablet, Rfl: 3    ipratropium (Atrovent) 21 mcg (0.03 %) nasal spray, Administer 2 sprays into each nostril 2 times a day as needed for rhinitis., Disp: 30 mL, Rfl: 1    loratadine (Claritin) 10 mg tablet, Take 1 tablet (10 mg) by mouth once daily., Disp: 30 tablet, Rfl: 5    losartan (Cozaar) 25 mg tablet, Take 1 tablet (25 mg) by mouth once daily., Disp: 90 tablet, Rfl: 3

## 2025-05-14 ENCOUNTER — TELEPHONE (OUTPATIENT)
Dept: ENDOCRINOLOGY | Facility: CLINIC | Age: 75
End: 2025-05-14
Payer: MEDICARE

## 2025-05-14 PROCEDURE — RXMED WILLOW AMBULATORY MEDICATION CHARGE

## 2025-05-14 NOTE — TELEPHONE ENCOUNTER
I contacted the pt Alycia Denny, She does not want to see a new provider at all. She prefers to wait for the next available with you.      Zee SIMS

## 2025-05-15 ENCOUNTER — PHARMACY VISIT (OUTPATIENT)
Dept: PHARMACY | Facility: CLINIC | Age: 75
End: 2025-05-15
Payer: COMMERCIAL

## 2025-05-15 ENCOUNTER — HOSPITAL ENCOUNTER (OUTPATIENT)
Dept: VASCULAR MEDICINE | Facility: HOSPITAL | Age: 75
Discharge: HOME | End: 2025-05-15
Payer: MEDICARE

## 2025-05-15 ENCOUNTER — OFFICE VISIT (OUTPATIENT)
Dept: CARDIOLOGY | Facility: HOSPITAL | Age: 75
End: 2025-05-15
Payer: MEDICARE

## 2025-05-15 VITALS
WEIGHT: 211 LBS | HEART RATE: 67 BPM | DIASTOLIC BLOOD PRESSURE: 83 MMHG | SYSTOLIC BLOOD PRESSURE: 146 MMHG | BODY MASS INDEX: 36.22 KG/M2 | OXYGEN SATURATION: 94 %

## 2025-05-15 DIAGNOSIS — I51.89 DIASTOLIC DYSFUNCTION: ICD-10-CM

## 2025-05-15 DIAGNOSIS — I48.0 PAROXYSMAL ATRIAL FIBRILLATION (MULTI): Primary | ICD-10-CM

## 2025-05-15 DIAGNOSIS — I73.9 CLAUDICATION OF BOTH LOWER EXTREMITIES: ICD-10-CM

## 2025-05-15 DIAGNOSIS — I10 ESSENTIAL HYPERTENSION: ICD-10-CM

## 2025-05-15 LAB
ATRIAL RATE: 67 BPM
P AXIS: 32 DEGREES
P OFFSET: 205 MS
P ONSET: 155 MS
PR INTERVAL: 118 MS
Q ONSET: 214 MS
QRS COUNT: 11 BEATS
QRS DURATION: 88 MS
QT INTERVAL: 408 MS
QTC CALCULATION(BAZETT): 431 MS
QTC FREDERICIA: 423 MS
R AXIS: 15 DEGREES
T AXIS: 20 DEGREES
T OFFSET: 418 MS
VENTRICULAR RATE: 67 BPM

## 2025-05-15 PROCEDURE — 3077F SYST BP >= 140 MM HG: CPT | Performed by: INTERNAL MEDICINE

## 2025-05-15 PROCEDURE — 93923 UPR/LXTR ART STDY 3+ LVLS: CPT

## 2025-05-15 PROCEDURE — 93923 UPR/LXTR ART STDY 3+ LVLS: CPT | Performed by: SURGERY

## 2025-05-15 PROCEDURE — 93005 ELECTROCARDIOGRAM TRACING: CPT | Performed by: INTERNAL MEDICINE

## 2025-05-15 PROCEDURE — 3079F DIAST BP 80-89 MM HG: CPT | Performed by: INTERNAL MEDICINE

## 2025-05-15 PROCEDURE — 99214 OFFICE O/P EST MOD 30 MIN: CPT | Performed by: INTERNAL MEDICINE

## 2025-05-15 PROCEDURE — 4010F ACE/ARB THERAPY RXD/TAKEN: CPT | Performed by: INTERNAL MEDICINE

## 2025-05-15 PROCEDURE — 99214 OFFICE O/P EST MOD 30 MIN: CPT | Mod: 25 | Performed by: INTERNAL MEDICINE

## 2025-05-15 PROCEDURE — G2211 COMPLEX E/M VISIT ADD ON: HCPCS | Performed by: INTERNAL MEDICINE

## 2025-05-15 PROCEDURE — 1036F TOBACCO NON-USER: CPT | Performed by: INTERNAL MEDICINE

## 2025-05-15 PROCEDURE — 1159F MED LIST DOCD IN RCRD: CPT | Performed by: INTERNAL MEDICINE

## 2025-05-15 NOTE — PROGRESS NOTES
Chief Complaint:   No chief complaint on file.     History Of Present Illness:    Alycia Denny is a 75 y.o. female presenting for follow-up.  Doing well from a cardiac standpoint.  Denies any angina, palpitations, dyspnea on exertion, albeit arthritis has been limiting.  Seeing neurology.     Last Recorded Vitals:  Vitals:    05/15/25 0925   BP: 146/83   Pulse: 67   SpO2: 94%   Weight: 95.7 kg (211 lb)       Past Medical History:  She has a past medical history of Arthritis, Hyperlipidemia, unspecified (11/28/2022), Neuralgia and neuritis, unspecified (05/18/2022), and Type 2 diabetes mellitus without complications (11/28/2022).    Past Surgical History:  She has no past surgical history on file.      Social History:  She reports that she has never smoked. She has never used smokeless tobacco. She reports that she does not drink alcohol and does not use drugs.    Family History:  Family History[1]     Allergies:  Guaifenesin-dm, Aspirin, Clarithromycin, Jardiance [empagliflozin], Metformin, and Rosuvastatin    Outpatient Medications:  Current Outpatient Medications   Medication Instructions    albuterol (Ventolin HFA) 90 mcg/actuation inhaler 2 puffs, inhalation, Every 6 hours PRN    benzonatate (TESSALON) 100 mg, oral, 3 times daily PRN, Do not crush or chew.    biotin 5,000 mcg tablet,chewable Chew.    Eliquis 5 mg, oral, 2 times daily    flash glucose sensor kit (FreeStyle Gabbi 2 Sensor) kit Change the sensor every 14 days    furosemide (Lasix) 40 mg tablet TAKE ONE TABLET BY MOUTH TWO TIMES A DAY MORNING AND LATE AFTERNOON    ibuprofen 600 mg, 3 times daily    ipratropium (Atrovent) 21 mcg (0.03 %) nasal spray 2 sprays, Each Nostril, 2 times daily PRN    loratadine (CLARITIN) 10 mg, oral, Daily    losartan (COZAAR) 25 mg, oral, Daily    metoprolol tartrate (LOPRESSOR) 50 mg, oral, 2 times daily    Mounjaro 5 mg, subcutaneous, Once Weekly    multivit-min-folic acid-biotin (Women's Multivitamin w-Biotin) 200-300  mcg tablet,chewable 1 tablet, Daily       Physical Exam:  Constitutional:       Appearance: Healthy appearance. Not in distress.   Neck:      Vascular: No JVR. JVD normal.   Pulmonary:      Effort: Pulmonary effort is normal.      Breath sounds: Normal breath sounds. No wheezing. No rhonchi. No rales.   Chest:      Chest wall: Not tender to palpatation.   Cardiovascular:      Normal rate. Regular rhythm.      Murmurs: There is no murmur.   Edema:     Peripheral edema absent.   Abdominal:      General: Bowel sounds are normal.      Palpations: Abdomen is soft.      Tenderness: There is no abdominal tenderness.   Musculoskeletal: Normal range of motion. Skin:     General: Skin is warm and dry.   Neurological:      General: No focal deficit present.      Mental Status: Alert and oriented to person, place and time.           Last Labs:  CBC -  Lab Results   Component Value Date    WBC 8.2 05/10/2024    HGB 15.0 05/10/2024    HCT 48.2 (H) 05/10/2024    MCV 88 05/10/2024     05/10/2024       CMP -  Lab Results   Component Value Date    CALCIUM 9.7 06/27/2024    PHOS 3.3 04/25/2024    PROT 8.0 05/10/2024    ALBUMIN 4.8 05/10/2024    AST 34 05/10/2024    ALT 41 05/10/2024    ALKPHOS 60 05/10/2024    BILITOT 0.8 05/10/2024       LIPID PANEL -   Lab Results   Component Value Date    CHOL 200 (H) 12/16/2022    TRIG 177 (H) 12/16/2022    HDL 31.3 (A) 12/16/2022    CHHDL 6.4 (A) 12/16/2022    VLDL 35 12/16/2022    NHDL 187 10/07/2021       RENAL FUNCTION PANEL -   Lab Results   Component Value Date    GLUCOSE 98 06/27/2024     06/27/2024    K 3.8 06/27/2024    CL 98 06/27/2024    CO2 27 06/27/2024    ANIONGAP 16 06/27/2024    BUN 27 (H) 06/27/2024    CREATININE 0.90 06/27/2024    CALCIUM 9.7 06/27/2024    PHOS 3.3 04/25/2024    ALBUMIN 4.8 05/10/2024        Lab Results   Component Value Date     (H) 05/10/2024    HGBA1C 7.4 (H) 06/27/2024       Last Cardiology Tests:  ECG independently reviewed from today:  Sinus rhythm, rate 67, nonspecific T wave abnormality    Echo 11/7/2024:   1. The left ventricular systolic function is normal, with a visually estimated ejection fraction of 60-65%.   2. Spectral Doppler shows a Grade I (impaired relaxation pattern) of left ventricular diastolic filling with normal left atrial filling pressure.   3. There is normal right ventricular global systolic function.   4. The left atrium is mildly dilated.   5. Mild aortic valve stenosis.   6. There is mild mitral and tricuspid regurgitation.     Echo 4/25/2024:   1. Left ventricular systolic function is mildly decreased with a 40-45% estimated ejection fraction.   2. Right ventricular volume overload.   3. There is mildly reduced right ventricular systolic function.   4. Mild to moderately elevated right ventricular systolic pressure.   5. The patient is in atrial fibrillation which may influence the estimate of left ventricular function and transvalvular flows.   6. There is global hypokinesis of the left ventricle with minor regional variations.    Assessment/Plan   Very pleasant 75-year-old female with dyslipidemia, diabetes, hypertension, paroxysmal atrial fibrillation.      Currently in sinus rhythm and euvolemic on exam, doing well from CV.  Plan to continue current apixaban, Lasix, metoprolol, and losartan.  Will see her again in 6 months or sooner if needed.       Oscar Rodriguez MD         [1] No family history on file.

## 2025-05-16 DIAGNOSIS — I73.9 PAD (PERIPHERAL ARTERY DISEASE): Primary | ICD-10-CM

## 2025-05-21 NOTE — PROGRESS NOTES
76 y/o presents to office for endometrial biopsy. Hx PMB x 1 year. US showed 2.5 cm thickened endometrial lining with cystic change. Pap smear also showed atypical endometrial cells, HPV neg.     Endometrial Ablation    Date/Time: 5/22/2025 2:13 PM    Performed by: AUSTIN Gamboa  Authorized by: AUSTIN Gamboa    Endometrial biopsy    Date/Time: 5/22/2025 2:14 PM    Performed by: AUSTIN Gamboa  Authorized by: AUSTIN Gamboa    Consent:     Consent obtained: written    Consent given by: patient    Risks discussed: bleeding    Alternatives discussed: referral    Patient agrees, verbalizes understanding, and wants to proceed: yes      Procedure explained and questions answered to patient or proxy's satisfaction: yes    Indications:     Indications: postmenopausal bleeding      Chronicity of post-menopausal bleeding: chronic    Progression of post-menopausal bleeding: improving  Pre-procedure:     Urine pregnancy test: negative    Procedure:     A bimanual exam was performed: yes      Uterus size: non-gravid    Prepped with: Betadine    Tenaculum used: yes      A local block was performed: no      Cervix dilated: no      Number of passes: 2  Findings:     Cervix: normal      Uterus depth by sound (cm): 8    Specimen collected: low volume sample collected      Patient tolerance: tolerated well, no immediate complications       Referral given for gyn oncology for follow up on pap.

## 2025-05-22 ENCOUNTER — APPOINTMENT (OUTPATIENT)
Facility: CLINIC | Age: 75
End: 2025-05-22
Payer: MEDICARE

## 2025-05-22 VITALS
SYSTOLIC BLOOD PRESSURE: 154 MMHG | HEIGHT: 64 IN | DIASTOLIC BLOOD PRESSURE: 94 MMHG | WEIGHT: 215 LBS | BODY MASS INDEX: 36.7 KG/M2

## 2025-05-22 DIAGNOSIS — N95.0 PMB (POSTMENOPAUSAL BLEEDING): ICD-10-CM

## 2025-05-22 ASSESSMENT — COLUMBIA-SUICIDE SEVERITY RATING SCALE - C-SSRS
6. HAVE YOU EVER DONE ANYTHING, STARTED TO DO ANYTHING, OR PREPARED TO DO ANYTHING TO END YOUR LIFE?: NO
1. IN THE PAST MONTH, HAVE YOU WISHED YOU WERE DEAD OR WISHED YOU COULD GO TO SLEEP AND NOT WAKE UP?: NO
2. HAVE YOU ACTUALLY HAD ANY THOUGHTS OF KILLING YOURSELF?: NO

## 2025-05-22 ASSESSMENT — PATIENT HEALTH QUESTIONNAIRE - PHQ9
1. LITTLE INTEREST OR PLEASURE IN DOING THINGS: NOT AT ALL
2. FEELING DOWN, DEPRESSED OR HOPELESS: NOT AT ALL
SUM OF ALL RESPONSES TO PHQ9 QUESTIONS 1 AND 2: 0

## 2025-05-22 ASSESSMENT — ENCOUNTER SYMPTOMS
LOSS OF SENSATION IN FEET: 0
OCCASIONAL FEELINGS OF UNSTEADINESS: 0
DEPRESSION: 0

## 2025-06-09 LAB
LABORATORY COMMENT REPORT: NORMAL
PATH REPORT.FINAL DX SPEC: NORMAL
PATH REPORT.GROSS SPEC: NORMAL
PATH REPORT.RELEVANT HX SPEC: NORMAL
PATH REPORT.TOTAL CANCER: NORMAL

## 2025-06-10 ENCOUNTER — TELEPHONE (OUTPATIENT)
Facility: CLINIC | Age: 75
End: 2025-06-10
Payer: MEDICARE

## 2025-06-16 NOTE — PROGRESS NOTES
"Patient ID: Alycia Denny is a 75 y.o. female.  Referring Physician: Alba Mike, APRN-CN  [id number] [facility] [address]  [place] [id number]  Primary Care Provider: Miguel Angel Clark DO      Subjective    HPI  75 y.o. presenting with pap showing AGC, EMB with normal cells, Imaging with thickened endometrium    Notes spotting secondary to initiation of Farxiga in April, she since stopped Farxiga. Daily Regimen includes Mounjaro, Eliquis 5 mg, Benzonatate, Furosemide, Eliquis 600 mg TID and Metoprolol.     They deny fever, chills, constipation, diarrhea, vaginal bleeding, abdominal pain, nausea, vomiting, or any other symptoms other than those listed in the interval history.    PMH:  Medical History[1]     PSH:  Surgical History[2]   Dalcon Shield placement    OBHx:  The patient is a .  x1 She underwent menopause at 52. She used COCs for >5 years. Hx of Dalcon Shield IUD. She did not use HRT.    Social:  They deny alcohol, tobacco, and recreational drug use. The patient lives at home with her . The patient works is a retired  for a [employer].     FamHx:  Their history is otherwise negative for a history of breast, ovarian, uterine, colon, pancreatic, and GI cancer.     Screening:  Cervical cancer: AGC, HPV neg in 2025  Mammogram:  1x unknown date, wnl  Colonoscopy: 1x unknown date      Review of Systems - Oncology     Objective   BSA: 2.1 meters squared  /76 (BP Location: Left arm, Patient Position: Sitting, BP Cuff Size: Large adult)   Pulse 81   Temp 36.8 °C (98.2 °F) (Temporal)   Resp 16   Ht (S) 1.612 m (5' 3.47\")   Wt 98.2 kg (216 lb 7.9 oz)   SpO2 94%   BMI 37.79 kg/m²      Family History[3]    Lexis Denny  reports that she has never smoked. She has never used smokeless tobacco.  She  reports no history of alcohol use.  She  reports no history of drug use.    Physical Exam  Constitutional:       General: She is not in acute distress.     Appearance: " Normal appearance. She is not toxic-appearing.   HENT:      Head: Normocephalic.      Mouth/Throat:      Mouth: Mucous membranes are moist.      Pharynx: Oropharynx is clear.   Eyes:      Extraocular Movements: Extraocular movements intact.      Conjunctiva/sclera: Conjunctivae normal.      Pupils: Pupils are equal, round, and reactive to light.   Cardiovascular:      Rate and Rhythm: Normal rate and regular rhythm.      Heart sounds: Normal heart sounds. No murmur heard.     No friction rub. No gallop.   Pulmonary:      Effort: Pulmonary effort is normal.      Breath sounds: Normal breath sounds. No wheezing or rhonchi.   Abdominal:      General: Bowel sounds are normal. There is no distension.      Palpations: Abdomen is soft.      Tenderness: There is no abdominal tenderness.   Musculoskeletal:         General: Normal range of motion.      Cervical back: Normal range of motion.   Skin:     General: Skin is warm.   Neurological:      General: No focal deficit present.      Mental Status: She is alert and oriented to person, place, and time.   Psychiatric:         Mood and Affect: Mood normal.         Behavior: Behavior normal.       US PELVIS TRANSABDOMINAL WITH TRANSVAGINAL  Status: Final result     PACS Images     Show images for US PELVIS TRANSABDOMINAL WITH TRANSVAGINAL  Signed by    Signed Time Phone Pager   Norbert Adams MD 4/21/2025 18:36 [phone]     Exam Information    Status Exam Begun Exam Ended   Final 4/19/2025 14:28 4/19/2025 15:44     Study Result    Narrative & Impression   Interpreted By:  Norbert Adams,   STUDY:  US PELVIS TRANSABDOMINAL WITH TRANSVAGINAL;  4/19/2025 3:44 pm      INDICATION:  Signs/Symptoms:PMB.      COMPARISON:  None.      ACCESSION NUMBER(S):  [id number]      ORDERING CLINICIAN:  ANDREA ENNIS      TECHNIQUE:  Multiple multiplanar static gray scale, color and spectral waveform  sonographic images of the pelvis were obtained.  Transabdominal  and  transvaginal ultrasound was performed.      FINDINGS:  UTERUS:  The uterus measures 8.3 x 5.4 x 9.6 cm. No uterine masses.      ENDOMETRIUM:  The endometrium measures 2.4 cm. Heterogeneous with cystic change.      RIGHT OVARY:  Obscured by overlying bowel gas.      LEFT OVARY:  Obscured by overlying bowel gas.      OTHER:  No significant pelvic free fluid.      IMPRESSION:  Abnormal thickening and heterogeneity of the endometrium. Recommend  tissue sampling to assess for endometrial hyperplasia versus  carcinoma. Yellow Alert.      Ovaries obscured.     Performance Status:  Asymptomatic    Assessment/Plan      Oncology History    No history exists.     75 y.o. presenting with PMB and pap showing AGC    # PMB/thickened endometrium  - We reviewed her imaging, pap test, and EMB results  - We discussed the differential diagnosis including benign, premalignant, and malignant conditions  - We discussed my recommendation for hysteroscopy D&C, ECC to exclude pathology  - Plan for procedure at [facility]  - She will need PAT  - Discussed risks/benefits/alternatives of surgery including but not limited to: 1% risk of surgical complication, bleeding, infection, DVT, pneumonia, re-operation. Patient voiced understanding and all questions were answered.  - Candidate for same day DC      Scribe Attestation  By signing my name below, I, Brittny Joseph   attest that this documentation has been prepared under the direction and in the presence of Hiwot Orellana MD, MS.        Provider Attestation - Scribe documentation    All medical record entries made by the Scribe were at my direction and personally dictated by me. I have reviewed the chart and agree that the record accurately reflects my personal performance of the history, physical exam, discussion and plan.    Hiwot Orellana MD, MS               [1]   Past Medical History:  Diagnosis Date    Arthritis     Hyperlipidemia, unspecified 11/28/2022     Hyperlipidemia    Neuralgia and neuritis, unspecified 05/18/2022    Neuropathic pain of lower extremity, unspecified laterality    Type 2 diabetes mellitus without complications 11/28/2022    Diabetes mellitus, type 2   [2] No past surgical history on file.  [3]   Family History  Problem Relation Name Age of Onset    Diabetes Mother      Diabetes Maternal Grandmother

## 2025-06-17 ENCOUNTER — PREP FOR PROCEDURE (OUTPATIENT)
Dept: OPERATING ROOM | Facility: HOSPITAL | Age: 75
End: 2025-06-17

## 2025-06-17 ENCOUNTER — OFFICE VISIT (OUTPATIENT)
Dept: GYNECOLOGIC ONCOLOGY | Facility: CLINIC | Age: 75
End: 2025-06-17
Payer: MEDICARE

## 2025-06-17 VITALS
WEIGHT: 216.49 LBS | HEIGHT: 63 IN | TEMPERATURE: 98.2 F | BODY MASS INDEX: 38.36 KG/M2 | RESPIRATION RATE: 16 BRPM | OXYGEN SATURATION: 94 % | SYSTOLIC BLOOD PRESSURE: 174 MMHG | HEART RATE: 81 BPM | DIASTOLIC BLOOD PRESSURE: 76 MMHG

## 2025-06-17 DIAGNOSIS — N95.0 PMB (POSTMENOPAUSAL BLEEDING): Primary | ICD-10-CM

## 2025-06-17 DIAGNOSIS — R93.89 THICKENED ENDOMETRIUM: ICD-10-CM

## 2025-06-17 PROCEDURE — 1125F AMNT PAIN NOTED PAIN PRSNT: CPT | Performed by: STUDENT IN AN ORGANIZED HEALTH CARE EDUCATION/TRAINING PROGRAM

## 2025-06-17 PROCEDURE — 1160F RVW MEDS BY RX/DR IN RCRD: CPT | Performed by: STUDENT IN AN ORGANIZED HEALTH CARE EDUCATION/TRAINING PROGRAM

## 2025-06-17 PROCEDURE — 4010F ACE/ARB THERAPY RXD/TAKEN: CPT | Performed by: STUDENT IN AN ORGANIZED HEALTH CARE EDUCATION/TRAINING PROGRAM

## 2025-06-17 PROCEDURE — 99204 OFFICE O/P NEW MOD 45 MIN: CPT | Performed by: STUDENT IN AN ORGANIZED HEALTH CARE EDUCATION/TRAINING PROGRAM

## 2025-06-17 PROCEDURE — 3077F SYST BP >= 140 MM HG: CPT | Performed by: STUDENT IN AN ORGANIZED HEALTH CARE EDUCATION/TRAINING PROGRAM

## 2025-06-17 PROCEDURE — 99214 OFFICE O/P EST MOD 30 MIN: CPT | Performed by: STUDENT IN AN ORGANIZED HEALTH CARE EDUCATION/TRAINING PROGRAM

## 2025-06-17 PROCEDURE — 1036F TOBACCO NON-USER: CPT | Performed by: STUDENT IN AN ORGANIZED HEALTH CARE EDUCATION/TRAINING PROGRAM

## 2025-06-17 PROCEDURE — 3078F DIAST BP <80 MM HG: CPT | Performed by: STUDENT IN AN ORGANIZED HEALTH CARE EDUCATION/TRAINING PROGRAM

## 2025-06-17 PROCEDURE — 1159F MED LIST DOCD IN RCRD: CPT | Performed by: STUDENT IN AN ORGANIZED HEALTH CARE EDUCATION/TRAINING PROGRAM

## 2025-06-17 SDOH — ECONOMIC STABILITY: FOOD INSECURITY: WITHIN THE PAST 12 MONTHS, YOU WORRIED THAT YOUR FOOD WOULD RUN OUT BEFORE YOU GOT THE MONEY TO BUY MORE.: NEVER TRUE

## 2025-06-17 SDOH — ECONOMIC STABILITY: FOOD INSECURITY: WITHIN THE PAST 12 MONTHS, THE FOOD YOU BOUGHT JUST DIDN'T LAST AND YOU DIDN'T HAVE MONEY TO GET MORE.: NEVER TRUE

## 2025-06-17 ASSESSMENT — PAIN SCALES - GENERAL: PAINLEVEL_OUTOF10: 7

## 2025-06-18 ENCOUNTER — PATIENT MESSAGE (OUTPATIENT)
Dept: ENDOCRINOLOGY | Facility: CLINIC | Age: 75
End: 2025-06-18
Payer: MEDICARE

## 2025-06-19 ENCOUNTER — TELEPHONE (OUTPATIENT)
Dept: GYNECOLOGIC ONCOLOGY | Facility: HOSPITAL | Age: 75
End: 2025-06-19
Payer: MEDICARE

## 2025-06-19 DIAGNOSIS — I10 ESSENTIAL HYPERTENSION: ICD-10-CM

## 2025-06-19 DIAGNOSIS — E11.69 TYPE 2 DIABETES MELLITUS WITH OTHER SPECIFIED COMPLICATION, WITHOUT LONG-TERM CURRENT USE OF INSULIN: Primary | ICD-10-CM

## 2025-06-19 DIAGNOSIS — E11.40 PAINFUL DIABETIC NEUROPATHY (MULTI): ICD-10-CM

## 2025-06-19 NOTE — TELEPHONE ENCOUNTER
"Patient called to report history of \"tilted uterus\" at time of new  patient appointment with Dr. Orellana on 6/17/25.    Patient also requesting referral for PCP.   Patient states she is schedule for primary care appointment with NP in [facility] but requesting PCP.    Phoned patient to provide number for [facility] and message routed to Dr. Orellana to update.      "
None needed

## 2025-06-24 ENCOUNTER — TELEPHONE (OUTPATIENT)
Dept: CARDIOLOGY | Facility: HOSPITAL | Age: 75
End: 2025-06-24
Payer: MEDICARE

## 2025-06-25 NOTE — TELEPHONE ENCOUNTER
I discussed with Dr. Rodriguez-- he recommends that she remain on her current medications.  If she would like to discuss further she can make an appointment as well.

## 2025-06-26 ENCOUNTER — TELEPHONE (OUTPATIENT)
Dept: CARDIOLOGY | Facility: HOSPITAL | Age: 75
End: 2025-06-26
Payer: MEDICARE

## 2025-07-09 ENCOUNTER — PRE-ADMISSION TESTING (OUTPATIENT)
Dept: PREADMISSION TESTING | Facility: HOSPITAL | Age: 75
End: 2025-07-09
Payer: MEDICARE

## 2025-07-09 VITALS
OXYGEN SATURATION: 95 % | HEART RATE: 64 BPM | HEIGHT: 63 IN | BODY MASS INDEX: 38.28 KG/M2 | TEMPERATURE: 97.2 F | RESPIRATION RATE: 16 BRPM | SYSTOLIC BLOOD PRESSURE: 163 MMHG | DIASTOLIC BLOOD PRESSURE: 78 MMHG | WEIGHT: 216.05 LBS

## 2025-07-09 DIAGNOSIS — N95.0 PMB (POSTMENOPAUSAL BLEEDING): ICD-10-CM

## 2025-07-09 DIAGNOSIS — Z01.818 PREOPERATIVE EXAMINATION: Primary | ICD-10-CM

## 2025-07-09 DIAGNOSIS — R93.89 THICKENED ENDOMETRIUM: ICD-10-CM

## 2025-07-09 LAB
ANION GAP SERPL CALC-SCNC: 15 MMOL/L (ref 10–20)
BUN SERPL-MCNC: 18 MG/DL (ref 6–23)
CALCIUM SERPL-MCNC: 10 MG/DL (ref 8.6–10.3)
CHLORIDE SERPL-SCNC: 101 MMOL/L (ref 98–107)
CO2 SERPL-SCNC: 27 MMOL/L (ref 21–32)
CREAT SERPL-MCNC: 0.88 MG/DL (ref 0.5–1.05)
EGFRCR SERPLBLD CKD-EPI 2021: 69 ML/MIN/1.73M*2
ERYTHROCYTE [DISTWIDTH] IN BLOOD BY AUTOMATED COUNT: 14.1 % (ref 11.5–14.5)
GLUCOSE SERPL-MCNC: 129 MG/DL (ref 74–99)
HCT VFR BLD AUTO: 40.9 % (ref 36–46)
HGB BLD-MCNC: 13.3 G/DL (ref 12–16)
MCH RBC QN AUTO: 29 PG (ref 26–34)
MCHC RBC AUTO-ENTMCNC: 32.5 G/DL (ref 32–36)
MCV RBC AUTO: 89 FL (ref 80–100)
NRBC BLD-RTO: 0 /100 WBCS (ref 0–0)
PLATELET # BLD AUTO: 273 X10*3/UL (ref 150–450)
POTASSIUM SERPL-SCNC: 4.1 MMOL/L (ref 3.5–5.3)
RBC # BLD AUTO: 4.58 X10*6/UL (ref 4–5.2)
SODIUM SERPL-SCNC: 139 MMOL/L (ref 136–145)
WBC # BLD AUTO: 7.5 X10*3/UL (ref 4.4–11.3)

## 2025-07-09 PROCEDURE — 36415 COLL VENOUS BLD VENIPUNCTURE: CPT

## 2025-07-09 PROCEDURE — 85027 COMPLETE CBC AUTOMATED: CPT

## 2025-07-09 PROCEDURE — 80048 BASIC METABOLIC PNL TOTAL CA: CPT

## 2025-07-09 ASSESSMENT — ENCOUNTER SYMPTOMS
GASTROINTESTINAL NEGATIVE: 1
CONSTITUTIONAL NEGATIVE: 1
NEUROLOGICAL NEGATIVE: 1
CARDIOVASCULAR NEGATIVE: 1
RESPIRATORY NEGATIVE: 1
MUSCULOSKELETAL NEGATIVE: 1
EYES NEGATIVE: 1

## 2025-07-09 ASSESSMENT — DUKE ACTIVITY SCORE INDEX (DASI)
CAN YOU TAKE CARE OF YOURSELF (EAT, DRESS, BATHE, OR USE TOILET): YES
CAN YOU HAVE SEXUAL RELATIONS: YES
CAN YOU WALK INDOORS, SUCH AS AROUND YOUR HOUSE: YES
DASI METS SCORE: 5.3
CAN YOU CLIMB A FLIGHT OF STAIRS OR WALK UP A HILL: YES
CAN YOU PARTICIPATE IN STRENOUS SPORTS LIKE SWIMMING, SINGLES TENNIS, FOOTBALL, BASKETBALL, OR SKIING: NO
TOTAL_SCORE: 20.7
CAN YOU RUN A SHORT DISTANCE: NO
CAN YOU DO HEAVY WORK AROUND THE HOUSE LIKE SCRUBBING FLOORS OR LIFTING AND MOVING HEAVY FURNITURE: NO
CAN YOU DO LIGHT WORK AROUND THE HOUSE LIKE DUSTING OR WASHING DISHES: YES
CAN YOU PARTICIPATE IN MODERATE RECREATIONAL ACTIVITIES LIKE GOLF, BOWLING, DANCING, DOUBLES TENNIS OR THROWING A BASEBALL OR FOOTBALL: NO
CAN YOU WALK A BLOCK OR TWO ON LEVEL GROUND: YES
CAN YOU DO YARD WORK LIKE RAKING LEAVES, WEEDING OR PUSHING A MOWER: NO
CAN YOU DO MODERATE WORK AROUND THE HOUSE LIKE VACUUMING, SWEEPING FLOORS OR CARRYING GROCERIES: NO

## 2025-07-09 ASSESSMENT — LIFESTYLE VARIABLES: SMOKING_STATUS: NONSMOKER

## 2025-07-09 ASSESSMENT — PAIN - FUNCTIONAL ASSESSMENT: PAIN_FUNCTIONAL_ASSESSMENT: 0-10

## 2025-07-09 NOTE — CPM/PAT H&P
"CPM/PAT Evaluation       Name: Lexis Denny (Lexis Denny \"Alycia\")  /Age: 1950/75 y.o.     Visit Type:   In-Person       Chief Complaint: PMB; thickened endometrium    HPI 74 y/o female scheduled for hysteroscopy with D&C on 2025 with  Dr. Orellana secondary to PMB; thickened endometrium.  PMHX includes Afib s/p DCCV (2024), T2DM, neuropathy.   PAT is consulted today for perioperative risk stratification and optimization.      Medical History[1]    Surgical History[2]    Patient  reports being sexually active and has had partner(s) who are male. She reports using the following method of birth control/protection: Post-menopausal.    Family History[3]    Allergies[4]    Prior to Admission medications    Medication Sig Start Date End Date Taking? Authorizing Provider   albuterol (Ventolin HFA) 90 mcg/actuation inhaler Inhale 2 puffs every 6 hours if needed for wheezing or shortness of breath.  Patient not taking: Reported on 2025  Milton Baum MD   apixaban (Eliquis) 5 mg tablet Take 1 tablet (5 mg) by mouth 2 times a day. 25   Milton Baum MD   benzonatate (Tessalon) 100 mg capsule Take 1 capsule (100 mg) by mouth 3 times a day as needed for cough. Do not crush or chew. 25  Miguel Angel Clark, DO   biotin 5,000 mcg tablet,chewable Chew.    Historical Provider, MD   flash glucose sensor kit (FreeStyle Gabbi 2 Sensor) kit Change the sensor every 14 days 25   Sangeetha Champagne, APRN-CNP   furosemide (Lasix) 40 mg tablet TAKE ONE TABLET BY MOUTH TWO TIMES A DAY MORNING AND LATE AFTERNOON 3/21/25   Miguel Angel Clark DO   ibuprofen 200 mg tablet Take 3 tablets (600 mg) by mouth 3 times a day.    Historical Provider, MD   ipratropium (Atrovent) 21 mcg (0.03 %) nasal spray Administer 2 sprays into each nostril 2 times a day as needed for rhinitis. 9/3/24 9/3/25  Miguel Angel Clark DO   loratadine (Claritin) 10 mg tablet Take 1 tablet (10 mg) by mouth once daily. " "5/23/24 5/15/25  Jeronimo Gonzalez MD   losartan (Cozaar) 25 mg tablet Take 1 tablet (25 mg) by mouth once daily. 2/12/25   Milton Baum MD   metoprolol tartrate (Lopressor) 50 mg tablet Take 1 tablet by mouth 2 times a day. 4/18/25   Miguel Angel Clark, DO   multivit-min-folic acid-biotin (Women's Multivitamin w-Biotin) 200-300 mcg tablet,chewable Chew 1 tablet once daily. 2/26/20   Historical Provider, MD   tirzepatide (Mounjaro) 5 mg/0.5 mL pen injector Inject 5 mg under the skin 1 (one) time per week. 2/16/25   Milton Baum MD        PAT ROS:   Constitutional:   neg    Neuro/Psych:   neg    Eyes:   neg    Ears:   neg    Nose:   Mouth:   Throat:   Neck:   Cardio:   neg    Respiratory:   neg    Endocrine:   GI:   neg    :   neg    Musculoskeletal:   neg    Hematologic:   Skin:      Physical Exam  Vitals reviewed.   Constitutional:       Appearance: Normal appearance.   HENT:      Head: Normocephalic and atraumatic.      Mouth/Throat:      Mouth: Mucous membranes are moist.   Eyes:      Extraocular Movements: Extraocular movements intact.   Cardiovascular:      Rate and Rhythm: Normal rate and regular rhythm.   Pulmonary:      Effort: Pulmonary effort is normal.      Breath sounds: Normal breath sounds.   Musculoskeletal:         General: Normal range of motion.      Cervical back: Normal range of motion.   Skin:     General: Skin is warm and dry.   Neurological:      General: No focal deficit present.      Mental Status: She is alert and oriented to person, place, and time.   Psychiatric:         Mood and Affect: Mood normal.         Behavior: Behavior normal.          Airway        Visit Vitals  /78   Pulse 64   Temp 36.2 °C (97.2 °F) (Tympanic)   Resp 16   Ht 1.6 m (5' 3\")   Wt 98 kg (216 lb 0.8 oz)   SpO2 95%   BMI 38.27 kg/m²   OB Status Postmenopausal   Smoking Status Never   BSA 2.09 m²       DASI Risk Score      Flowsheet Row Pre-Admission Testing from 7/9/2025 in [facility]   Can you take " care of yourself (eat, dress, bathe, or use toilet)?  2.75 filed at 07/09/2025 1120   Can you walk indoors, such as around your house? 1.75 filed at 07/09/2025 1120   Can you walk a block or two on level ground?  2.75 filed at 07/09/2025 1120   Can you climb a flight of stairs or walk up a hill? 5.5 filed at 07/09/2025 1120   Can you run a short distance? 0 filed at 07/09/2025 1120   Can you do light work around the house like dusting or washing dishes? 2.7 filed at 07/09/2025 1120   Can you do moderate work around the house like vacuuming, sweeping floors or carrying groceries? 0 filed at 07/09/2025 1120   Can you do heavy work around the house like scrubbing floors or lifting and moving heavy furniture?  0 filed at 07/09/2025 1120   Can you do yard work like raking leaves, weeding or pushing a mower? 0 filed at 07/09/2025 1120   Can you have sexual relations? 5.25 filed at 07/09/2025 1120   Can you participate in moderate recreational activities like golf, bowling, dancing, doubles tennis or throwing a baseball or football? 0 filed at 07/09/2025 1120   Can you participate in strenous sports like swimming, singles tennis, football, basketball, or skiing? 0 filed at 07/09/2025 1120   DASI SCORE 20.7 filed at 07/09/2025 1120   METS Score (Will be calculated only when all the questions are answered) 5.3 filed at 07/09/2025 1120          Caprini DVT Assessment      Flowsheet Row Pre-Admission Testing from 7/9/2025 in [facility] ED to Hosp-Admission (Discharged) from 4/25/2024 in [facility] with Silvano Jo DO and Leodan Alcaraz MD   DVT Score (IF A SCORE IS NOT CALCULATING, MUST SELECT A BMI TO COMPLETE) 6 filed at 07/09/2025 1140 5 filed at 04/25/2024 1433   Surgical Factors Minor surgery planned filed at 07/09/2025 1140 --   BMI (BMI MUST BE CHOSEN) 31-40 (Obesity) filed at 07/09/2025 1140 41-50 (Morbid obesity) filed at 04/25/2024 1433   RETIRED: Age -- 60-75 years filed at  04/25/2024 1433          Modified Frailty Index    No data to display       [id number] Stroke Risk Points  Current as of just now        7 0 to 9 Points:      Last Change:           The [id number]-[id number] risk score (Lip GH, et al. 2009. © 2010 [employer]) quantifies the risk of stroke for a patient with atrial fibrillation. For patients without atrial fibrillation or under the age of 18 this score appears as N/A. Higher score values generally indicate higher risk of stroke.          Points Metrics   1 Has Congestive Heart Failure:  Yes     Patients with congestive heart failure get 1 point.    Current as of just now   1 Has Hypertension:  Yes     Patients with hypertension get 1 point.    Current as of just now   2 Age:  75     Patients 65 to 74 years old get 1 point, or patients 75 years and older get 2 points.    Current as of just now   1 Has Diabetes:  Yes     Patients with diabetes get 1 point.    Current as of just now   0 Had Stroke:  No  Had TIA:  No  Had Thromboembolism:  No     Patients who have had a stroke, TIA, or thromboembolism get 2 points.    Current as of just now   1 Has Vascular Disease:  Yes     Patients with vascular disease get 1 point.    Current as of just now   1 Clinically Relevant Sex:  Female     Patients with a clinically relevant sex of Female get 1 point.    Current as of just now             Revised Cardiac Risk Index      Flowsheet Row Pre-Admission Testing from 7/9/2025 in [facility]   High-Risk Surgery (Intraperitoneal, Intrathoracic,Suprainguinal vascular) 0 filed at 07/09/2025 1252   History of ischemic heart disease (History of MI, History of positive exercuse test, Current chest paint considered due to myocardial ischemia, Use of nitrate therapy, ECG with pathological Q Waves) 0 filed at 07/09/2025 1252   History of congestive heart failure (pulmonary edemia, bilateral rales or S3 gallop, Paroxysmal nocturnal dyspnea, CXR showing pulmonary  vascular redistribution) 0 filed at 07/09/2025 1252   History of cerebrovascular disease (Prior TIA or stroke) 0 filed at 07/09/2025 1252   Pre-operative insulin treatment 0 filed at 07/09/2025 1252   Pre-operative creatinine>2 mg/dl 0 filed at 07/09/2025 1252   Revised Cardiac Risk Calculator 0 filed at 07/09/2025 1252          Apfel Simplified Score      Flowsheet Row Pre-Admission Testing from 7/9/2025 in [facility]   Smoking status 1 filed at 07/09/2025 1255   History of motion sickness or PONV  0 filed at 07/09/2025 1255   Use of postoperative opioids 1 filed at 07/09/2025 1255   Gender - Female 1=Yes filed at 07/09/2025 1255   Apfel Simplified Score Calculator 3 filed at 07/09/2025 1255          Risk Analysis Index Results This Encounter    No data found in the last 10 encounters.       Stop Bang Score      Flowsheet Row Pre-Admission Testing from 7/9/2025 in [facility]   Do you snore loudly? 1 filed at 07/09/2025 1118   Do you often feel tired or fatigued after your sleep? 0 filed at 07/09/2025 1118   Has anyone ever observed you stop breathing in your sleep? 0 filed at 07/09/2025 1118   Do you have or are you being treated for high blood pressure? 1 filed at 07/09/2025 1118   Recent BMI (Calculated) 37.8 filed at 07/09/2025 1118   Is BMI greater than 35 kg/m2? 1=Yes filed at 07/09/2025 1118   Age older than 50 years old? 1=Yes filed at 07/09/2025 1118   Is your neck circumference greater than 17 inches (Male) or 16 inches (Female)? 0 filed at 07/09/2025 1118   Gender - Male 0=No filed at 07/09/2025 1118   STOP-BANG Total Score 4 filed at 07/09/2025 1118          Prodigy: High Risk  Total Score: 17              Prodigy Age Score      Prodigy SDB Score          ARISCAT Score for Postoperative Pulmonary Complications      Flowsheet Row Pre-Admission Testing from 7/9/2025 in [facility]   Age Calculated Score 3 filed at 07/09/2025 1255   Preoperative SpO2 0 filed at  07/09/2025 1255   Respiratory infection in the last month Either upper or lower (i.e., URI, bronchitis, pneumonia), with fever and antibiotic treatment 0 filed at 07/09/2025 1255   Preoperative anemia (Hgb less than 10 g/dl) 0 filed at 07/09/2025 1255   Surgical incision  0 filed at 07/09/2025 1255   Duration of surgery  0 filed at 07/09/2025 1255   Emergency Procedure  0 filed at 07/09/2025 1255   ARISCAT Total Score  3 filed at 07/09/2025 1255          Ale Perioperative Risk for Myocardial Infarction or Cardiac Arrest (FADI)      Flowsheet Row Pre-Admission Testing from 7/9/2025 in [facility]   Calculated Age Score 1.5 filed at 07/09/2025 1255   Functional Status  0 filed at 07/09/2025 1255   ASA Class  -3.29 filed at 07/09/2025 1255   Creatinine 0 filed at 07/09/2025 1255   Type of Procedure  0.76 filed at 07/09/2025 1255   FADI Total Score  -6.28 filed at 07/09/2025 1255   FADI % 0.19 filed at 07/09/2025 1255                Assessment and Plan:     HPI 76 y/o female scheduled for hysteroscopy with D&C on 7/23/2025 with  Dr. Orellana secondary to PMB; thickened endometrium.  PMHX includes Afib s/p DCCV (4/2024), T2DM, neuropathy.   PAT is consulted today for perioperative risk stratification and optimization.    Neuro:  No neurologic diagnosis, however, the patient is at increased risk for perioperative delirium secondary to age, polypharmacy  Patient is at increased risk for perioperative CVA secondary to  Afib, perioperative interruption of anticoagulant, HTN, increased age    HEENT:  No HEENT diagnosis or significant findings on chart review or clinical presentation and evaluation. No further preoperative testing/intervention indicated at this time.    Cardiovascular:  Follows with Dr. Rodriguez and was seen on 5/15/2025 for paroxysmal A-fib  ECHO 11/2024:  EF 60-65%, IDD, LA mildly dilated , mild AVS  Holding Eliquis 3 days prior to procedure  Continue metoprolol  Hold losartan and Lasix day of  procedure  METS: 5.3  RCRI: 0 points, 3.9%  risk for postoperative MACE       Pulmonary:  No pulmonary diagnosis, however patient is at increased risk of perioperative complications secondary to age > 60  Stop Bang score is 4 placing patient at high risk for GUMARO  PRODIGY: High risk for opioid induced respiratory depression  Pumonary education discussed, patient also provided deep breathing exerciseswith educational handout    Renal:   No renal diagnosis, however patient is at increase risk for perioperative renal complications secondary to  Age equal to or greater than 56, BMI equal to or greater than 30, HTN, use of an ace, arb, or NSAID      Endocrine:  T2DM - Managed by PCP  Holding [facility] day of procedure  GLP1 fasting instructions discussed and given to pt    Hematologic:  No hematologic diagnosis, however patient is at an increased risk for DVT  Caprini Score 6, patient at High risk for perioperative DVT.  Patient provided with VTE education/handout.    Gastrointestinal:   No GI diagnosis or significant findings on chart review or clinical presentation and evaluation.   Apfel 3    OB/GYN  Seen by Dr. Orellana on 6/17/2025 for PMB  and thickened endometrium  Plan for hysteroscopy D&C    Infectious disease:   No infectious diagnosis or significant findings on chart review or clinical presentation and evaluation.       Musculoskeletal:   No diagnosis or significant findings on chart review or clinical presentation and evaluation.     Anesthesia/Airway:  No anesthesia complications      Medication instructions and NPO guidelines reviewed with the patient.  All questions or concerns discussed and addressed.      Labs and EKG ordered                  [1]   Past Medical History:  Diagnosis Date    Arrhythmia     PAF    Arthritis     Dysfunctional uterine bleeding     Hyperlipidemia, unspecified 11/28/2022    Hyperlipidemia    Neuralgia and neuritis, unspecified 05/18/2022    Neuropathic pain of lower extremity,  unspecified laterality    Peripheral neuropathy     Type 2 diabetes mellitus without complications 11/28/2022    Diabetes mellitus, type 2   [2]   Past Surgical History:  Procedure Laterality Date    CARDIOVERSION     [3]   Family History  Problem Relation Name Age of Onset    Diabetes Mother      Diabetes Maternal Grandmother     [4]   Allergies  Allergen Reactions    Guaifenesin-Dm Cardiac arrhythmia/arrest and Anxiety    Aspirin Other    Clarithromycin Other    Jardiance [Empagliflozin] Other     dehydrated    Metformin GI Upset    Rosuvastatin Unknown

## 2025-07-09 NOTE — PREPROCEDURE INSTRUCTIONS
Thank you for visiting Preadmission Testing (PAT) today for your pre-procedure evaluation, you were seen by     Karma Maza CNP  Pre Admission Testing  [facility]  [phone]    This summary includes instructions and information to aid you during your perioperative period.  Please read carefully. If you have any questions about your visit today, please call the number listed above.  If you become ill or have any changes to your health before your surgery, please contact your primary care provider and alert your surgeon.        Preparing for your Surgery       Exercises  Preoperative Deep Breathing Exercises  Why it is important to do deep breathing exercises before my surgery?  Deep breathing exercises strengthen your breathing muscles.  This helps you to recover after your surgery and decreases the chance of breathing complications.  How are the deep breathing exercises done?  Sit straight with your back supported.  Breathe in deeply and slowly through your nose. Your lower rib cage should expand and your abdomen may move forward.  Hold that breath for 3 to 5 seconds.  Breathe out through pursed lips, slowly and completely.  Rest and repeat 10 times every hour while awake.  Rest longer if you become dizzy or lightheaded.       Preoperative Brain Exercises    What are brain exercises?  A brain exercise is any activity that engages your thinking (cognitive) skills.    What types of activities are considered brain exercises?  Jigsaw puzzles, crossword puzzles, word jumble, memory games, word search, and many more.  Many can be found free online or on your phone via a mobile milagros.    Why should I do brain exercises before my surgery?  More recent research has shown brain exercise before surgery can lower the risk of postoperative delirium (confusion) which can be especially important for older adults.  Patients who did brain exercises for 5 to 10 hours the days before surgery, cut their  risk of postoperative delirium in half up to 1 week after surgery.    Sit-to-Stand Exercise    What is the sit-to-stand exercise?  The sit-to-stand exercise strengthens the muscles of your lower body and muscles in the center of your body (core muscles for stability) helping to maintain and improve your strength and mobility.  How do I do the sit-to-stand exercise?  The goal is to do this exercise without using your arms or hands.  If this is too difficult, use your arms and hands or a chair with armrests to help slowly push yourself to the standing position and lower yourself back to the sitting position. As the movement becomes easier use your arms and hands less.    Steps to the sit-to-stand exercise  Sit up tall in a sturdy chair, knees bent, feet flat on the floor shoulder-width apart.  Shift your hips/pelvis forward in the chair to correctly position yourself for the next movement.  Lean forward at your hips.  Stand up straight putting equal weight on both feet.  Check to be sure you are properly aligned with the chair, in a slow controlled movement sit back down.  Repeat this exercise 10-15 times.  If needed you can do it fewer times until your strength improves.  Rest for 1 minute.  Do another 10-15 sit-to-stand exercises.  Try to do this in the morning and evening.        Instructions    Preoperative Fasting Guidelines    Why must I stop eating and drinking near surgery time?  With sedation, food or liquid in your stomach can enter your lungs causing serious complications  Food can increase nausea and vomiting  When do I need to stop eating and drinking before my surgery?      Why must I stop eating and drinking before surgery?     With anesthesia, food or liquid in your stomach can enter your lungs causing serious complications     GLP-1 medications can slow the movement of food through your stomach and intestines.  This further increases the risk of food entering your lungs with anesthesia     When do I  need to stop eating and drinking before my surgery?     To help ensure food has passed out of your stomach, START a clear liquid diet 24 hours before your surgery     On the day of your surgery/procedure, STOP all clear liquids 2 hours before your arrival time to the hospital/facility      A clear liquid diet consists of clear liquids and foods that melt into a clear liquid (i.e. gelatin) and excludes solid foods and liquids you cannot see through (i.e. milk). Clears can and should contain sugar to obtain a sufficient number of calories.  A clear liquid diet includes     Clear, fat-free broth     Clear nutritional drinks     Pulp-free popsicles, vegetable and fruit juice     Gelatin     Coffee and tea without creamer or milk     Clear soda and sports drinks       Diabetic Patients     Clear liquids should not be sugar-free      Check your blood glucose levels as you normally do     If you have symptoms of low blood glucose (shakiness, sweating, dizziness, confusion) or high blood glucose (dry mouth, excessive thirst, frequent urination, blurry vision), check your blood glucose level     For low blood glucose increase your consumption of sugar-containing clear liquid      For high blood glucose, decrease your consumption of sugar-containing clears and treat as you normally would     If symptoms persist seek medical attention          Simple things you can do to help prevent blood clots     Blood clots are blockages that can form in the body's veins. When a blood clot forms in your deep veins, it may be called a deep vein thrombosis, or DVT for short. Blood clots can happen in any part of the body where blood flows, but they are most common in the arms and legs. If a piece of a blood clot breaks free and travels to the lungs, it is called a pulmonary embolus (PE). A PE can be a very serious problem.         Being in the hospital or having surgery can raise your chances of getting a blood clot because you may not be  well enough to move around as much as you normally do.         Ways you can help prevent blood clots in the hospital       Wearing SCDs  SCDs stands for Sequential Compression Devices.   SCDs are special sleeves that wrap around your legs. They attach to a pump that fills them with air to gently squeeze your legs every few minutes.  This helps return the blood in your legs to your heart.   SCDs should only be taken off when walking or bathing. SCDs may not be comfortable, but they can help save your life.              Pump SCD leg sleeves  Wearing compression stockings - if your doctor orders them. These special snug-fitting stockings gently squeeze your legs to help blood flow.       Walking. Walking helps move the blood in your legs.   If your doctor says it is ok, try walking the halls at least   5 times a day. Ask us to help you get up, so you don't fall.      Taking any blood-thinning medicines your doctor orders.              Ways you can help prevent blood clots at home         Wearing compression stockings - if your doctor orders them.   Walking - to help move the blood in your legs.    Taking any blood-thinning medicines your doctor orders.      Signs of a blood clot or PE    Tell your doctor or nurse right away if you have any of the problems listed below.         If you are at home, seek medical care right away. Call 911 for chest pain or problems breathing.            Signs of a blood clot (DVT) - such as pain, swelling, redness, or warmth in your arm or legs.  Signs of a pulmonary embolism (PE) - such as chest pain or feeling short of breath      Tobacco and Alcohol;  Do not drink alcohol or smoke within 24 hours of surgery.  It is best to quit smoking for as long as possible before any surgery or procedure.            The Week before Surgery        Seven days before Surgery  Check your PAT medication instructions  Do the exercises provided to you by PAT  Arrange for a responsible, adult licensed   to take you home after surgery and stay with you for 24 hours.  You will not be permitted to drive yourself home if you have received any anesthetic/sedation  Six days before surgery  Check your PAT medication instructions  Do the exercises provided to you by PAT  Start using Chlorhexidene (CHG) body wash if prescribed  Five days before surgery  Check your PAT medication instructions  Do the exercises provided to you by PAT  Continue to use CHG body wash if prescribed  Three days before surgery  Check your PAT medication instructions  Do the exercises provided to you by PAT  Continue to use CHG body wash if prescribed  Two days before surgery  Check your PAT medication instructions  Do the exercises provided to you by PAT  Continue to use CHG body wash if prescribed    The Day before Surgery       Check your PAT medication and all other PAT instructions including when to stop eating and drinking  You will be called with your arrival time for surgery in the late afternoon.  If you do not receive a call please reach out to [facility]. [phone]  Do not smoke or drink 24 hours before surgery  Prepare items to bring with you to the hospital  Shower with your chlorhexidine wash if prescribed  Brush your teeth and use your chlorhexidine dental rinse if prescribed    The Day of Surgery       Check your PAT medication instructions  Ensure you follow the instructions for when to stop eating and drinking  Shower, if prescribed use CHG.  Do not apply any lotions, creams, moisturizers, perfume or deodorant  Brush your teeth and use your CHG dental rinse if prescribed  Wear loose comfortable clothing  Avoid make-up  Remove  jewelry and piercings, consider professional piercing removal with a plastic spacer if needed  Bring photo ID and Insurance card  Bring an accurate medication list that includes medication dose, frequency and allergies  Bring a copy of your advanced directives (will, health care power of  )  Bring any devices and controllers as well as medical devices you have been provided with for surgery (CPAP, slings, braces, etc.)  Dentures, eyeglasses, and contacts will be removed before surgery, please bring cases for contacts or glasses

## 2025-07-10 ENCOUNTER — ANESTHESIA EVENT (OUTPATIENT)
Dept: OPERATING ROOM | Facility: HOSPITAL | Age: 75
End: 2025-07-10
Payer: MEDICARE

## 2025-07-10 PROCEDURE — RXMED WILLOW AMBULATORY MEDICATION CHARGE

## 2025-07-11 ENCOUNTER — PHARMACY VISIT (OUTPATIENT)
Dept: PHARMACY | Facility: CLINIC | Age: 75
End: 2025-07-11
Payer: COMMERCIAL

## 2025-07-16 PROCEDURE — RXMED WILLOW AMBULATORY MEDICATION CHARGE

## 2025-07-17 ENCOUNTER — PHARMACY VISIT (OUTPATIENT)
Dept: PHARMACY | Facility: CLINIC | Age: 75
End: 2025-07-17
Payer: COMMERCIAL

## 2025-07-18 ENCOUNTER — APPOINTMENT (OUTPATIENT)
Dept: CARDIOLOGY | Facility: HOSPITAL | Age: 75
End: 2025-07-18
Payer: MEDICARE

## 2025-07-21 ENCOUNTER — TELEPHONE (OUTPATIENT)
Dept: GYNECOLOGIC ONCOLOGY | Facility: HOSPITAL | Age: 75
End: 2025-07-21
Payer: MEDICARE

## 2025-07-21 NOTE — TELEPHONE ENCOUNTER
Below jt message forwarded to Dr. Orellana for her review.                Hi Dr. Orellana,  I am scheduled to have a D&C with you on Wednesday, July 23rd.  I just want to let you know that I have not had any bleeding or spotting for the last 3 weeks.  I still think that my problem may have been caused by the Farxiga I was taking.    I have followed all of the instructions that were given to me to  prepare for the procedure. I am just wondering if we should proceed despite the fact that I am no longer spotting or bleeding.   Thank you,  Alycia Nicholson

## 2025-07-22 RX ORDER — ALBUTEROL SULFATE 0.83 MG/ML
2.5 SOLUTION RESPIRATORY (INHALATION) ONCE AS NEEDED
Status: CANCELLED | OUTPATIENT
Start: 2025-07-22

## 2025-07-22 RX ORDER — OXYCODONE HYDROCHLORIDE 5 MG/1
5 TABLET ORAL EVERY 4 HOURS PRN
Refills: 0 | Status: CANCELLED | OUTPATIENT
Start: 2025-07-22

## 2025-07-22 RX ORDER — DIPHENHYDRAMINE HYDROCHLORIDE 50 MG/ML
12.5 INJECTION, SOLUTION INTRAMUSCULAR; INTRAVENOUS ONCE AS NEEDED
Status: CANCELLED | OUTPATIENT
Start: 2025-07-22

## 2025-07-22 RX ORDER — MEPERIDINE HYDROCHLORIDE 25 MG/ML
12.5 INJECTION INTRAMUSCULAR; INTRAVENOUS; SUBCUTANEOUS EVERY 10 MIN PRN
Status: CANCELLED | OUTPATIENT
Start: 2025-07-22

## 2025-07-22 RX ORDER — DROPERIDOL 2.5 MG/ML
0.62 INJECTION, SOLUTION INTRAMUSCULAR; INTRAVENOUS ONCE AS NEEDED
Status: CANCELLED | OUTPATIENT
Start: 2025-07-22

## 2025-07-22 RX ORDER — ONDANSETRON HYDROCHLORIDE 2 MG/ML
4 INJECTION, SOLUTION INTRAVENOUS ONCE AS NEEDED
Status: CANCELLED | OUTPATIENT
Start: 2025-07-22

## 2025-07-22 RX ORDER — SODIUM CHLORIDE, SODIUM LACTATE, POTASSIUM CHLORIDE, CALCIUM CHLORIDE 600; 310; 30; 20 MG/100ML; MG/100ML; MG/100ML; MG/100ML
100 INJECTION, SOLUTION INTRAVENOUS CONTINUOUS
Status: CANCELLED | OUTPATIENT
Start: 2025-07-22 | End: 2025-07-23

## 2025-07-22 NOTE — HOSPITAL COURSE
"Gynecologic Oncology H&P    Lexis Denny \"Alycia\" is a 75 y.o. with PMB and pap with AGC presenting for hysteroscopy and D&C, endocervical curettage.    PMHx: T2DM, Afib s/p DCCV (2024)  PAT: completed 25    Recent labs:   Lab Results   Component Value Date    HGB 13.3 2025     2025    CREATININE 0.88 2025    HGBA1C 7.4 (H) 2024       GYO History:  Imaging/pathology  Oncology History    No history exists.        Tumor Markers:  No results found for: \"\", \"\", \"CEA\"    Past Medical History:  Past Medical History:   Diagnosis Date    A-fib (Multi)     Arrhythmia     PAF    Arthritis     Arthritis is severe q    Asthma     Dysfunctional uterine bleeding     HTN (hypertension)     Hyperlipidemia, unspecified 2022    Hyperlipidemia    Neuralgia and neuritis, unspecified 2022    Neuropathic pain of lower extremity, unspecified laterality    Neuromuscular disorder (Multi)     Diabetic neuropathy    GUMARO (obstructive sleep apnea)     Peripheral neuropathy     PMB (postmenopausal bleeding)     Prematurity (HHS-HCC)     Thickened endometrium     Type 2 diabetes mellitus without complications 2022    Diabetes mellitus, type 2    Vision loss     I have prescription glasses    Vitamin D deficiency         Surgical History:  Past Surgical History:   Procedure Laterality Date    CARDIOVERSION  2024        Ob/Gyn History:    x1  Menopause age: 52  LOR use: > 5 years  HRT use: none    Social History:   reports that she has never smoked. She has never used smokeless tobacco. She reports that she does not drink alcohol and does not use drugs.     Family History:  Cancer-related family history includes Colon cancer in her father and father.     Medications:  Prior to Admission medications   Medication Sig Start Date End Date Taking? Authorizing Provider   albuterol (Ventolin HFA) 90 mcg/actuation inhaler Inhale 2 puffs every 6 hours if needed for wheezing or " shortness of breath.  Patient not taking: Reported on 7/9/2025 2/13/25 5/6/25  Milton Baum MD   apixaban (Eliquis) 5 mg tablet Take 1 tablet (5 mg) by mouth 2 times a day. 2/13/25   Milton Baum MD   benzonatate (Tessalon) 100 mg capsule Take 1 capsule (100 mg) by mouth 3 times a day as needed for cough. Do not crush or chew. 4/2/25 9/29/25  Miguel Angel Clark, DO   biotin 5,000 mcg tablet,chewable Chew.    Historical Provider, MD   flash glucose sensor kit (FreeStyle Gabbi 2 Sensor) kit Change the sensor every 14 days 1/17/25   Sangeetha Champagne, APRN-CNP   furosemide (Lasix) 40 mg tablet TAKE ONE TABLET BY MOUTH TWO TIMES A DAY MORNING AND LATE AFTERNOON 3/21/25   Miguel Angel Clark DO   ibuprofen 200 mg tablet Take 3 tablets (600 mg) by mouth 3 times a day.    Historical Provider, MD   ipratropium (Atrovent) 21 mcg (0.03 %) nasal spray Administer 2 sprays into each nostril 2 times a day as needed for rhinitis.  Patient not taking: Reported on 7/9/2025 9/3/24 9/3/25  Miguel Angel Clark DO   loratadine (Claritin) 10 mg tablet Take 1 tablet (10 mg) by mouth once daily. 5/23/24 5/15/25  Jeronimo Gonzalez MD   losartan (Cozaar) 25 mg tablet Take 1 tablet (25 mg) by mouth once daily. 2/12/25   Milton Baum MD   metoprolol tartrate (Lopressor) 50 mg tablet Take 1 tablet by mouth 2 times a day. 4/18/25   Miguel Angel Clark DO   multivit-min-folic acid-biotin (Women's Multivitamin w-Biotin) 200-300 mcg tablet,chewable Chew 1 tablet once daily. 2/26/20   Historical Provider, MD   tirzepatide (Mounjaro) 5 mg/0.5 mL pen injector Inject 5 mg under the skin 1 (one) time per week.  Patient taking differently: Inject 5 mg under the skin 1 (one) time per week. fridays 2/16/25   Milton Baum MD       Allergies:  Guaifenesin-dm, Aspirin, Clarithromycin, Jardiance [empagliflozin], Metformin, and Rosuvastatin    Objective  There were no vitals taken for this visit.     Physical exam:   General: no acute distress  HEENT: normocephalic,  "atraumatic  CV: warm and well perfused  Lungs: breathing comfortably on room air  Abdomen: soft, non-tender  Extremities: moving all extremities spontaneously  Neuro: awake and conversant  Psych: appropriate mood and affect      Lab Review  Lab Results   Component Value Date    WBC 7.5 07/09/2025    HGB 13.3 07/09/2025    HCT 40.9 07/09/2025     07/09/2025    CHOL 200 (H) 12/16/2022    TRIG 177 (H) 12/16/2022    HDL 31.3 (A) 12/16/2022    ALT 41 05/10/2024    AST 34 05/10/2024     07/09/2025    K 4.1 07/09/2025     07/09/2025    CREATININE 0.88 07/09/2025    BUN 18 07/09/2025    CO2 27 07/09/2025    TSH 4.78 (H) 08/20/2024    HGBA1C 7.4 (H) 06/27/2024       Imaging  See Tumor History above    Assessment & Plan     Lexis Denisha \"Alycia\" is a 75 y.o. with post-menopausal bleeding and abnormal pap (AGC) presenting for hysteroscopy with dilation and curettage of uterus, endocervical curettage, and any other indicated procedures.     Plan to proceed with procedure.    Anticipated discharge plan: Same day dc    To be seen and d/w Dr. Esteban Farah, MS4  [facility]   Gynecologic Oncology  Pager [phone]  Phone [phone]   "

## 2025-07-23 ENCOUNTER — ANESTHESIA (OUTPATIENT)
Dept: OPERATING ROOM | Facility: HOSPITAL | Age: 75
End: 2025-07-23
Payer: MEDICARE

## 2025-07-23 ENCOUNTER — HOSPITAL ENCOUNTER (OUTPATIENT)
Facility: HOSPITAL | Age: 75
Setting detail: OUTPATIENT SURGERY
Discharge: HOME | End: 2025-07-23
Attending: STUDENT IN AN ORGANIZED HEALTH CARE EDUCATION/TRAINING PROGRAM | Admitting: STUDENT IN AN ORGANIZED HEALTH CARE EDUCATION/TRAINING PROGRAM
Payer: MEDICARE

## 2025-07-23 VITALS
DIASTOLIC BLOOD PRESSURE: 81 MMHG | HEART RATE: 67 BPM | HEIGHT: 64 IN | WEIGHT: 211.42 LBS | BODY MASS INDEX: 36.09 KG/M2 | SYSTOLIC BLOOD PRESSURE: 140 MMHG | OXYGEN SATURATION: 97 % | RESPIRATION RATE: 18 BRPM | TEMPERATURE: 96.8 F

## 2025-07-23 DIAGNOSIS — N95.0 PMB (POSTMENOPAUSAL BLEEDING): ICD-10-CM

## 2025-07-23 DIAGNOSIS — R93.89 THICKENED ENDOMETRIUM: ICD-10-CM

## 2025-07-23 LAB
GLUCOSE BLD MANUAL STRIP-MCNC: 111 MG/DL (ref 74–99)
GLUCOSE BLD MANUAL STRIP-MCNC: 142 MG/DL (ref 74–99)

## 2025-07-23 PROCEDURE — 3700000001 HC GENERAL ANESTHESIA TIME - INITIAL BASE CHARGE: Performed by: STUDENT IN AN ORGANIZED HEALTH CARE EDUCATION/TRAINING PROGRAM

## 2025-07-23 PROCEDURE — 3600000008 HC OR TIME - EACH INCREMENTAL 1 MINUTE - PROCEDURE LEVEL THREE: Performed by: STUDENT IN AN ORGANIZED HEALTH CARE EDUCATION/TRAINING PROGRAM

## 2025-07-23 PROCEDURE — 7100000009 HC PHASE TWO TIME - INITIAL BASE CHARGE: Performed by: STUDENT IN AN ORGANIZED HEALTH CARE EDUCATION/TRAINING PROGRAM

## 2025-07-23 PROCEDURE — 2500000004 HC RX 250 GENERAL PHARMACY W/ HCPCS (ALT 636 FOR OP/ED): Performed by: NURSE ANESTHETIST, CERTIFIED REGISTERED

## 2025-07-23 PROCEDURE — 99100 ANES PT EXTEME AGE<1 YR&>70: CPT | Performed by: ANESTHESIOLOGY

## 2025-07-23 PROCEDURE — 58558 HYSTEROSCOPY BIOPSY: CPT | Performed by: STUDENT IN AN ORGANIZED HEALTH CARE EDUCATION/TRAINING PROGRAM

## 2025-07-23 PROCEDURE — 7100000002 HC RECOVERY ROOM TIME - EACH INCREMENTAL 1 MINUTE: Performed by: STUDENT IN AN ORGANIZED HEALTH CARE EDUCATION/TRAINING PROGRAM

## 2025-07-23 PROCEDURE — 7100000001 HC RECOVERY ROOM TIME - INITIAL BASE CHARGE: Performed by: STUDENT IN AN ORGANIZED HEALTH CARE EDUCATION/TRAINING PROGRAM

## 2025-07-23 PROCEDURE — 82947 ASSAY GLUCOSE BLOOD QUANT: CPT

## 2025-07-23 PROCEDURE — 3700000002 HC GENERAL ANESTHESIA TIME - EACH INCREMENTAL 1 MINUTE: Performed by: STUDENT IN AN ORGANIZED HEALTH CARE EDUCATION/TRAINING PROGRAM

## 2025-07-23 PROCEDURE — 3600000003 HC OR TIME - INITIAL BASE CHARGE - PROCEDURE LEVEL THREE: Performed by: STUDENT IN AN ORGANIZED HEALTH CARE EDUCATION/TRAINING PROGRAM

## 2025-07-23 PROCEDURE — 2500000004 HC RX 250 GENERAL PHARMACY W/ HCPCS (ALT 636 FOR OP/ED): Performed by: ANESTHESIOLOGY

## 2025-07-23 PROCEDURE — A58558 PR HYSTEROSCOPY,W/ENDO BX: Performed by: ANESTHESIOLOGY

## 2025-07-23 PROCEDURE — A58558 PR HYSTEROSCOPY,W/ENDO BX: Performed by: NURSE ANESTHETIST, CERTIFIED REGISTERED

## 2025-07-23 PROCEDURE — 7100000010 HC PHASE TWO TIME - EACH INCREMENTAL 1 MINUTE: Performed by: STUDENT IN AN ORGANIZED HEALTH CARE EDUCATION/TRAINING PROGRAM

## 2025-07-23 RX ORDER — SODIUM CHLORIDE, SODIUM LACTATE, POTASSIUM CHLORIDE, CALCIUM CHLORIDE 600; 310; 30; 20 MG/100ML; MG/100ML; MG/100ML; MG/100ML
20 INJECTION, SOLUTION INTRAVENOUS CONTINUOUS
Status: DISCONTINUED | OUTPATIENT
Start: 2025-07-23 | End: 2025-07-23 | Stop reason: HOSPADM

## 2025-07-23 RX ORDER — LIDOCAINE HYDROCHLORIDE 10 MG/ML
INJECTION, SOLUTION EPIDURAL; INFILTRATION; INTRACAUDAL; PERINEURAL AS NEEDED
Status: DISCONTINUED | OUTPATIENT
Start: 2025-07-23 | End: 2025-07-23

## 2025-07-23 RX ORDER — PROPOFOL 10 MG/ML
INJECTION, EMULSION INTRAVENOUS AS NEEDED
Status: DISCONTINUED | OUTPATIENT
Start: 2025-07-23 | End: 2025-07-23

## 2025-07-23 RX ORDER — ONDANSETRON HYDROCHLORIDE 2 MG/ML
INJECTION, SOLUTION INTRAVENOUS AS NEEDED
Status: DISCONTINUED | OUTPATIENT
Start: 2025-07-23 | End: 2025-07-23

## 2025-07-23 RX ORDER — KETOROLAC TROMETHAMINE 30 MG/ML
INJECTION, SOLUTION INTRAMUSCULAR; INTRAVENOUS AS NEEDED
Status: DISCONTINUED | OUTPATIENT
Start: 2025-07-23 | End: 2025-07-23

## 2025-07-23 RX ORDER — FENTANYL CITRATE 50 UG/ML
INJECTION, SOLUTION INTRAMUSCULAR; INTRAVENOUS AS NEEDED
Status: DISCONTINUED | OUTPATIENT
Start: 2025-07-23 | End: 2025-07-23

## 2025-07-23 RX ADMIN — DEXAMETHASONE SODIUM PHOSPHATE 4 MG: 4 INJECTION INTRA-ARTICULAR; INTRALESIONAL; INTRAMUSCULAR; INTRAVENOUS; SOFT TISSUE at 14:29

## 2025-07-23 RX ADMIN — FENTANYL CITRATE 50 MCG: 50 INJECTION, SOLUTION INTRAMUSCULAR; INTRAVENOUS at 14:32

## 2025-07-23 RX ADMIN — FENTANYL CITRATE 50 MCG: 50 INJECTION, SOLUTION INTRAMUSCULAR; INTRAVENOUS at 14:27

## 2025-07-23 RX ADMIN — LIDOCAINE HYDROCHLORIDE 50 MG: 10 SOLUTION INTRAVENOUS at 14:27

## 2025-07-23 RX ADMIN — SODIUM CHLORIDE, SODIUM LACTATE, POTASSIUM CHLORIDE, AND CALCIUM CHLORIDE 20 ML/HR: .6; .31; .03; .02 INJECTION, SOLUTION INTRAVENOUS at 13:41

## 2025-07-23 RX ADMIN — KETOROLAC TROMETHAMINE 15 MG: 30 INJECTION, SOLUTION INTRAMUSCULAR; INTRAVENOUS at 14:55

## 2025-07-23 RX ADMIN — ONDANSETRON 4 MG: 2 INJECTION, SOLUTION INTRAMUSCULAR; INTRAVENOUS at 14:20

## 2025-07-23 RX ADMIN — PROPOFOL 150 MG: 10 INJECTION, EMULSION INTRAVENOUS at 14:27

## 2025-07-23 SDOH — HEALTH STABILITY: MENTAL HEALTH: CURRENT SMOKER: 0

## 2025-07-23 ASSESSMENT — COLUMBIA-SUICIDE SEVERITY RATING SCALE - C-SSRS
1. IN THE PAST MONTH, HAVE YOU WISHED YOU WERE DEAD OR WISHED YOU COULD GO TO SLEEP AND NOT WAKE UP?: NO
6. HAVE YOU EVER DONE ANYTHING, STARTED TO DO ANYTHING, OR PREPARED TO DO ANYTHING TO END YOUR LIFE?: NO
2. HAVE YOU ACTUALLY HAD ANY THOUGHTS OF KILLING YOURSELF?: NO

## 2025-07-23 ASSESSMENT — PAIN SCALES - GENERAL
PAINLEVEL_OUTOF10: 0 - NO PAIN

## 2025-07-23 ASSESSMENT — PAIN - FUNCTIONAL ASSESSMENT
PAIN_FUNCTIONAL_ASSESSMENT: 0-10

## 2025-07-23 NOTE — DISCHARGE INSTRUCTIONS
Post-Operative Instructions     In an emergency, call 911 or go to an Emergency Department at a nearby hospital. Cramps and spotting are the most common side effect after D&C. They usually rapidly subside after the procedure, but may last for a day or two. Light bleeding can persist for several days.   Call your provider's office if you experience cramps lasting longer than 48 hours, increasing pain, prolonged or heavy bleeding (such as saturating a sanitary pad within one hour more than once or passing golf ball sized clots), or a foul-smelling vaginal discharge.     Call us at [phone] if you have:  Vaginal bleeding soaking >2 pads per hour for 2 hours  Temperature greater than 100.4  Persistent nausea and vomiting  Severe uncontrolled pain  Difficulty breathing, headache or visual disturbances  Hives  Persistent dizziness or light-headedness  Extreme fatigue  Any other questions or concerns you may have after discharge

## 2025-07-23 NOTE — H&P
"Gynecologic Oncology H&P    Lexis Denny \"Alycia\" is a 75 y.o. with PMB and pap with AGC presenting for hysteroscopy and D&C, endocervical curettage.    PMHx: T2DM, Afib s/p DCCV (2024)  PAT: completed 25    Recent labs:   Lab Results   Component Value Date    HGB 13.3 2025     2025    CREATININE 0.88 2025    HGBA1C 7.4 (H) 2024       GYO History:  Imaging/pathology  Oncology History    No history exists.        Tumor Markers:  No results found for: \"\", \"\", \"CEA\"    Past Medical History:  Past Medical History:   Diagnosis Date    A-fib (Multi)     Arrhythmia     PAF    Arthritis     Arthritis is severe q    Asthma     Dysfunctional uterine bleeding     HTN (hypertension)     Hyperlipidemia, unspecified 2022    Hyperlipidemia    Neuralgia and neuritis, unspecified 2022    Neuropathic pain of lower extremity, unspecified laterality    Neuromuscular disorder (Multi)     Diabetic neuropathy    GUMARO (obstructive sleep apnea)     Peripheral neuropathy     PMB (postmenopausal bleeding)     Prematurity (HHS-HCC)     Thickened endometrium     Type 2 diabetes mellitus without complications 2022    Diabetes mellitus, type 2    Vision loss     I have prescription glasses    Vitamin D deficiency         Surgical History:  Past Surgical History:   Procedure Laterality Date    CARDIOVERSION  2024        Ob/Gyn History:    x1  Menopause age: 52  LOR use: > 5 years  HRT use: none    Social History:   reports that she has never smoked. She has never used smokeless tobacco. She reports that she does not drink alcohol and does not use drugs.     Family History:  Cancer-related family history includes Colon cancer in her father and father.     Medications:  Prior to Admission medications   Medication Sig Start Date End Date Taking? Authorizing Provider   albuterol (Ventolin HFA) 90 mcg/actuation inhaler Inhale 2 puffs every 6 hours if needed for wheezing or " shortness of breath.  Patient not taking: Reported on 7/9/2025 2/13/25 5/6/25  Milton Baum MD   apixaban (Eliquis) 5 mg tablet Take 1 tablet (5 mg) by mouth 2 times a day. 2/13/25   Milton Baum MD   benzonatate (Tessalon) 100 mg capsule Take 1 capsule (100 mg) by mouth 3 times a day as needed for cough. Do not crush or chew. 4/2/25 9/29/25  Miguel Angel Clark, DO   biotin 5,000 mcg tablet,chewable Chew.    Historical Provider, MD   flash glucose sensor kit (FreeStyle Gabbi 2 Sensor) kit Change the sensor every 14 days 1/17/25   Sangeetha Champagne, APRN-CNP   furosemide (Lasix) 40 mg tablet TAKE ONE TABLET BY MOUTH TWO TIMES A DAY MORNING AND LATE AFTERNOON 3/21/25   Miguel Angel Clark DO   ibuprofen 200 mg tablet Take 3 tablets (600 mg) by mouth 3 times a day.    Historical Provider, MD   ipratropium (Atrovent) 21 mcg (0.03 %) nasal spray Administer 2 sprays into each nostril 2 times a day as needed for rhinitis.  Patient not taking: Reported on 7/9/2025 9/3/24 9/3/25  Miguel Angel Clark DO   loratadine (Claritin) 10 mg tablet Take 1 tablet (10 mg) by mouth once daily. 5/23/24 5/15/25  Jeronimo Gonzalez MD   losartan (Cozaar) 25 mg tablet Take 1 tablet (25 mg) by mouth once daily. 2/12/25   Milton Baum MD   metoprolol tartrate (Lopressor) 50 mg tablet Take 1 tablet by mouth 2 times a day. 4/18/25   Miguel Angel Clark DO   multivit-min-folic acid-biotin (Women's Multivitamin w-Biotin) 200-300 mcg tablet,chewable Chew 1 tablet once daily. 2/26/20   Historical Provider, MD   tirzepatide (Mounjaro) 5 mg/0.5 mL pen injector Inject 5 mg under the skin 1 (one) time per week.  Patient taking differently: Inject 5 mg under the skin 1 (one) time per week. fridays 2/16/25   Milton Baum MD       Allergies:  Guaifenesin-dm, Aspirin, Clarithromycin, Jardiance [empagliflozin], Metformin, and Rosuvastatin    Objective  There were no vitals taken for this visit.     Physical exam:   General: no acute distress  HEENT: normocephalic,  "atraumatic  CV: warm and well perfused  Lungs: breathing comfortably on room air  Abdomen: soft, non-tender  Extremities: moving all extremities spontaneously  Neuro: awake and conversant  Psych: appropriate mood and affect      Lab Review  Lab Results   Component Value Date    WBC 7.5 07/09/2025    HGB 13.3 07/09/2025    HCT 40.9 07/09/2025     07/09/2025    CHOL 200 (H) 12/16/2022    TRIG 177 (H) 12/16/2022    HDL 31.3 (A) 12/16/2022    ALT 41 05/10/2024    AST 34 05/10/2024     07/09/2025    K 4.1 07/09/2025     07/09/2025    CREATININE 0.88 07/09/2025    BUN 18 07/09/2025    CO2 27 07/09/2025    TSH 4.78 (H) 08/20/2024    HGBA1C 7.4 (H) 06/27/2024       Imaging  See Tumor History above    Assessment & Plan     Lexis Denisha \"Alycia\" is a 75 y.o. with post-menopausal bleeding and abnormal pap (AGC) presenting for hysteroscopy with dilation and curettage of uterus, endocervical curettage, and any other indicated procedures.     Plan to proceed with procedure.    Anticipated discharge plan: Same day dc    To be seen and d/w Dr. Esteban Farah, MS4  [facility]   Gynecologic Oncology  Pager [phone]  Phone [phone]     Hiwot Orellana MD, MS   "

## 2025-07-23 NOTE — ANESTHESIA PREPROCEDURE EVALUATION
"Patient: Lexis Denny \"Alycia\"    Procedure Information       Date/Time: 07/23/25 1345    Procedure: HYSTEROSCOPY, WITH DILATION AND CURETTAGE OF UTERUS, endocervical curettage, any other indicated procedures    Location: [facility] OR  / [facility]    Surgeons: Hiwot Orellana MD, MS          Vitals:    07/23/25 1254   BP: (!) 185/84   Pulse: 67   Resp: 16   Temp: 37 °C (98.6 °F)   SpO2: 96%       Surgical History[1]  Medical History[2]  Current Medications[3]  Prior to Admission medications   Medication Sig Start Date End Date Taking? Authorizing Provider   benzonatate (Tessalon) 100 mg capsule Take 1 capsule (100 mg) by mouth 3 times a day as needed for cough. Do not crush or chew. 4/2/25 9/29/25 Yes Miguel Angel Clark, DO   biotin 5,000 mcg tablet,chewable Chew.   Yes Historical Provider, MD   flash glucose sensor kit (FreeStyle Gabbi 2 Sensor) kit Change the sensor every 14 days 1/17/25  Yes Sangeetha Champagne, APRN-CNP   furosemide (Lasix) 40 mg tablet TAKE ONE TABLET BY MOUTH TWO TIMES A DAY MORNING AND LATE AFTERNOON 3/21/25  Yes Miguel Angel Clark DO   ibuprofen 200 mg tablet Take 3 tablets (600 mg) by mouth 3 times a day.   Yes Historical Provider, MD   loratadine (Claritin) 10 mg tablet Take 1 tablet (10 mg) by mouth once daily. 5/23/24 7/23/25 Yes Jeronimo Gonzalez MD   losartan (Cozaar) 25 mg tablet Take 1 tablet (25 mg) by mouth once daily. 2/12/25  Yes Milton Baum MD   metoprolol tartrate (Lopressor) 50 mg tablet Take 1 tablet by mouth 2 times a day. 4/18/25  Yes Miguel Angel Clark DO   multivit-min-folic acid-biotin (Women's Multivitamin w-Biotin) 200-300 mcg tablet,chewable Chew 1 tablet once daily. 2/26/20  Yes Historical Provider, MD   albuterol (Ventolin HFA) 90 mcg/actuation inhaler Inhale 2 puffs every 6 hours if needed for wheezing or shortness of breath.  Patient not taking: Reported on 7/9/20259/2025 2/13/25 5/6/25  Milton Baum MD   apixaban (Eliquis) 5 mg tablet Take 1 tablet (5 mg) by mouth 2 times a " "day. 2/13/25   Milton Baum MD   ipratropium (Atrovent) 21 mcg (0.03 %) nasal spray Administer 2 sprays into each nostril 2 times a day as needed for rhinitis.  Patient not taking: Reported on 7/23/2025 9/3/24 9/3/25  Miguel Angel Clark,    tirzepatide (Mounjaro) 5 mg/0.5 mL pen injector Inject 5 mg under the skin 1 (one) time per week.  Patient taking differently: Inject 5 mg under the skin 1 (one) time per week. fridays 2/16/25   Milton Baum MD     RX Allergies[4]  Social History     Tobacco Use    Smoking status: Never    Smokeless tobacco: Never   Substance Use Topics    Alcohol use: Never         Chemistry    Lab Results   Component Value Date/Time     07/09/2025 1154    K 4.1 07/09/2025 1154     07/09/2025 1154    CO2 27 07/09/2025 1154    BUN 18 07/09/2025 1154    CREATININE 0.88 07/09/2025 1154    Lab Results   Component Value Date/Time    CALCIUM 10.0 07/09/2025 1154    ALKPHOS 60 05/10/2024 1915    AST 34 05/10/2024 1915    ALT 41 05/10/2024 1915    BILITOT 0.8 05/10/2024 1915          Lab Results   Component Value Date/Time    WBC 7.5 07/09/2025 1154    HGB 13.3 07/09/2025 1154    HCT 40.9 07/09/2025 1154     07/09/2025 1154     No results found for: \"PROTIME\", \"PTT\", \"INR\"  Encounter Date: 05/15/25   ECG 12 lead (Clinic Performed)   Result Value    Ventricular Rate 67    Atrial Rate 67    OK Interval 118    QRS Duration 88    QT Interval 408    QTC Calculation(Bazett) 431    P Axis 32    R Axis 15    T Axis 20    QRS Count 11    Q Onset 214    P Onset 155    P Offset 205    T Offset 418    QTC Fredericia 423    Narrative    Normal sinus rhythm  Nonspecific T wave abnormality  Abnormal ECG  When compared with ECG of 07-NOV-2024 10:28,  No significant change was found     No results found for this or any previous visit from the past 1095 days.      Relevant Problems   Cardiac   (+) Atrial fibrillation with RVR (Multi)   (+) Atrial fibrillation with rapid ventricular response (Multi) "   (+) Essential hypertension   (+) Hyperlipidemia      Pulmonary   (+) Asthma   (+) Dyspnea on exertion   (+) GUMARO (obstructive sleep apnea)      Neuro   (+) Peripheral neuropathy      Endocrine   (+) Diabetes mellitus, type 2 (Multi)   (+) Morbid (severe) obesity due to excess calories (Multi)   (+) Painful diabetic neuropathy (Multi)   (+) Type 2 diabetes mellitus with hyperglycemia (Multi)      Hematology   (+) Anticoagulant long-term use      Musculoskeletal   (+) OA (osteoarthritis) of hip   (+) OA (osteoarthritis) of knee      HEENT   (+) Sinus symptom   (+) Sinusitis       Clinical information reviewed:   Tobacco  Allergies  Meds   Med Hx  Surg Hx  OB Status  Fam Hx  Soc   Hx        NPO Detail:  NPO/Void Status  Carbohydrate Drink Given Prior to Surgery? : N  Date of Last Liquid: 07/23/25  Time of Last Liquid: 1030  Date of Last Solid: 07/22/25  Time of Last Solid: 1900  Last Intake Type: Clear fluids  Time of Last Void: 1300         Physical Exam    Airway  Mallampati: II  TM distance: >3 FB  Mouth opening: 3 or more finger widths     Cardiovascular - normal exam   Dental - normal exam     Pulmonary - normal exam   Abdominal            Anesthesia Plan    History of general anesthesia?: yes  History of complications of general anesthesia?: no    ASA 3     general     The patient is not a current smoker.  Patient was not previously instructed to abstain from smoking on day of procedure.  Patient did not smoke on day of procedure.  Education provided regarding risk of obstructive sleep apnea.  intravenous induction   Anesthetic plan and risks discussed with patient.    Plan discussed with CRNA.             [1]   Past Surgical History:  Procedure Laterality Date    CARDIOVERSION  04/29/2024   [2]   Past Medical History:  Diagnosis Date    A-fib (Multi)     Arrhythmia     PAF    Arthritis     Arthritis is severe q    Asthma     Dysfunctional uterine bleeding     HTN (hypertension)     Hyperlipidemia,  unspecified 11/28/2022    Hyperlipidemia    Neuralgia and neuritis, unspecified 05/18/2022    Neuropathic pain of lower extremity, unspecified laterality    Neuromuscular disorder (Multi)     Diabetic neuropathy    GUMARO (obstructive sleep apnea)     Peripheral neuropathy     PMB (postmenopausal bleeding)     Prematurity (HHS-HCC)     Thickened endometrium     Type 2 diabetes mellitus without complications 11/28/2022    Diabetes mellitus, type 2    Vision loss     I have prescription glasses    Vitamin D deficiency    [3]   Current Facility-Administered Medications:     lactated Ringer's infusion, 20 mL/hr, intravenous, Continuous, Ronan Lemons MD, Last Rate: 20 mL/hr at 07/23/25 1341, 20 mL/hr at 07/23/25 1341  [4]   Allergies  Allergen Reactions    Guaifenesin-Dm Cardiac arrhythmia/arrest and Anxiety    Aspirin Other    Clarithromycin Other    Jardiance [Empagliflozin] Other     dehydrated    Metformin GI Upset    Rosuvastatin Unknown

## 2025-07-23 NOTE — ANESTHESIA PROCEDURE NOTES
Airway  Date/Time: 7/23/2025 2:27 PM  Reason: elective    Airway not difficult    Staffing  Performed: CRNA   Authorized by: Ronan Lemons MD    Performed by: KWAME Ochoa  Patient location during procedure: OR    Patient Condition  Indications for airway management: anesthesia  Patient position: sniffing  MILS maintained throughout  Planned trial extubation  Sedation level: deep     Final Airway Details   Preoxygenated: yes  Final airway type: supraglottic airway  Successful airway: Supraglottic airway: igel.  Size: 4  Number of attempts at approach: 1  Number of other approaches attempted: 0

## 2025-07-23 NOTE — OP NOTE
"HYSTEROSCOPY, WITH DILATION AND CURETTAGE OF UTERUS, endocervical curettage, any other indicated procedures Operative Note     Date: 2025  OR Location: [facility] OR    Name: Lexis Denny \"Alycia\", : 1950, Age: 75 y.o., MRN: [id number], Sex: female    Diagnosis  Pre-op Diagnosis      * PMB (postmenopausal bleeding) [N95.0]     * Thickened endometrium [R93.89] Post-op Diagnosis     * PMB (postmenopausal bleeding) [N95.0]     * Thickened endometrium [R93.89]     Procedures  HYSTEROSCOPY, WITH DILATION AND CURETTAGE OF UTERUS, endocervical curettage, any other indicated procedures  81670 - MI DILATION & CURETTAGE DX&/THER NONOBSTETRIC      Surgeons      * Hiwot Orellana - Primary    Resident/Fellow/Other Assistant:  Surgeons and Role:  * No surgeons found with a matching role *    Staff:   Circulator: Catalina Cruz Person: Sandra  Surgical Assistant: Delia    Anesthesia Staff: Anesthesiologist: Ronan Lemons MD  CRNA: PITA Ochoa-EDDI    Procedure Summary  Anesthesia: Anesthesia type not filed in the log.  ASA: III  Estimated Blood Loss: 5mL  Intra-op Medications:   Administrations occurring from 1345 to 1445 on 25:   Medication Name Total Dose   dexAMETHasone (Decadron) 4 mg/mL IV Syringe 2 mL 4 mg   fentaNYL (Sublimaze) injection 50 mcg/mL 100 mcg   lidocaine PF (Xylocaine-MPF) local injection 1 % 50 mg   ondansetron (Zofran) 2 mg/mL injection 4 mg   propofol (Diprivan) injection 10 mg/mL 150 mg              Anesthesia Record               Intraprocedure I/O Totals       None           Specimen:   ID Type Source Tests Collected by Time   1 : ENDOCERVICAL CURETTINGS Tissue ENDOCERVIX CURETTINGS SURGICAL PATHOLOGY EXAM Hiwot Orellana MD, MS 2025 1444   2 : ENDOMETRIAL CURETTINGS Tissue ENDOMETRIUM CURETTINGS SURGICAL PATHOLOGY EXAM Hiwot Orellana MD, MS 2025 1445                 Drains and/or Catheters: * None in log *    Tourniquet Times:         Implants: " "    Findings: Uterus sounding to 10cm with numerous polyps occupying the endometrial cavity and endocervical cavity. Ostia not visualized secondary to polyps. Normal appearing cervix and vagina.    Indications: Lexis Denisha \"Alycia\" is an 75 y.o. female who is having surgery for PMB (postmenopausal bleeding) [N95.0]  Thickened endometrium [R93.89].     The patient was seen in the preoperative area. The risks, benefits, complications, treatment options, non-operative alternatives, expected recovery and outcomes were discussed with the patient. The possibilities of reaction to medication, pulmonary aspiration, injury to surrounding structures, bleeding, recurrent infection, the need for additional procedures, failure to diagnose a condition, and creating a complication requiring transfusion or operation were discussed with the patient. The patient concurred with the proposed plan, giving informed consent.  The site of surgery was properly noted/marked if necessary per policy. The patient has been actively warmed in preoperative area. Preoperative antibiotics are not indicated. Venous thrombosis prophylaxis have been ordered including bilateral sequential compression devices    Procedure Details:     The patient was taken to the OR where a preoperative huddle was performed. Anesthesia was initiated, Preoperative antibiotics were not indicated. The patient was positioned in dorsal lithotomy position and was prepped and draped in a sterile fashion.     A speculum was placed in the vagina and tenaculum was used to grasp the anterior lip of the cervix. The uterus sounded to 10cm. The cervix was then serially dilated to 19F. A hysteroscope was place with findings as above. A sharp curette was then used to collect endometrial curettings. Endocervical curettage was performed. The tenaculum was removed and the vagina/ cervix were examined for hemostasis. It was noted to be hemostatic.    All instruments were removed. " Counts were correct x2. The patient was awakened and taken to the PACU in good condition.    Evidence of Infection: No   Complications:  None; patient tolerated the procedure well.    Disposition: PACU - hemodynamically stable.  Condition: stable         Additional Details: None    Attending Attestation: I was present and scrubbed for the entire procedure.    Hiwot Orellana  Phone Number: [phone]

## 2025-07-23 NOTE — ANESTHESIA POSTPROCEDURE EVALUATION
"Patient: Lexis Denny \"Alycia\"    Procedure Summary       Date: 07/23/25 Room / Location: [facility] OR 05 / [facility]    Anesthesia Start: 1416 Anesthesia Stop: 1504    Procedure: HYSTEROSCOPY, WITH DILATION AND CURETTAGE OF UTERUS, endocervical curettage, any other indicated procedures Diagnosis:       PMB (postmenopausal bleeding)      Thickened endometrium      (PMB (postmenopausal bleeding) [N95.0])      (Thickened endometrium [R93.89])    Surgeons: Hiwot Orellana MD, MS Responsible Provider: Ronan Lemons MD    Anesthesia Type: general ASA Status: 3            Anesthesia Type: general    Vitals Value Taken Time   BP See vitals 07/23/25 15:26   Temp  07/23/25 15:26   Pulse  07/23/25 15:26   Resp  07/23/25 15:26   SpO2  07/23/25 15:26       Anesthesia Post Evaluation    Patient location during evaluation: PACU  Patient participation: complete - patient participated  Level of consciousness: awake  Pain management: adequate  Multimodal analgesia pain management approach  Airway patency: patent  Two or more strategies used to mitigate risk of obstructive sleep apnea  Cardiovascular status: acceptable  Respiratory status: acceptable  Hydration status: acceptable  Postoperative Nausea and Vomiting: none        No notable events documented.    "

## 2025-07-28 ENCOUNTER — APPOINTMENT (OUTPATIENT)
Dept: ENDOCRINOLOGY | Facility: CLINIC | Age: 75
End: 2025-07-28
Payer: MEDICARE

## 2025-07-29 ENCOUNTER — TELEPHONE (OUTPATIENT)
Dept: HEMATOLOGY/ONCOLOGY | Facility: CLINIC | Age: 75
End: 2025-07-29
Payer: MEDICARE

## 2025-07-29 LAB
LAB AP ASR DISCLAIMER: NORMAL
LABORATORY COMMENT REPORT: NORMAL
PATH REPORT.COMMENTS IMP SPEC: NORMAL
PATH REPORT.FINAL DX SPEC: NORMAL
PATH REPORT.GROSS SPEC: NORMAL
PATH REPORT.RELEVANT HX SPEC: NORMAL
PATH REPORT.TOTAL CANCER: NORMAL

## 2025-07-29 PROCEDURE — RXMED WILLOW AMBULATORY MEDICATION CHARGE

## 2025-07-29 NOTE — TELEPHONE ENCOUNTER
Patient called into nurse's private line and left a VM. VM stating that she was able to review her results from her DNC with Dr. Orellana on 7- in [facility]. She specifically asking to speak in person or only with Dr. Orellana about next steps due to results.    JOSETTE NASCIMENTO RN

## 2025-07-30 ENCOUNTER — PHARMACY VISIT (OUTPATIENT)
Dept: PHARMACY | Facility: CLINIC | Age: 75
End: 2025-07-30
Payer: COMMERCIAL

## 2025-07-30 NOTE — PROGRESS NOTES
Patient ID: Alycia Denny is a 75 y.o. female.  Referring Physician: No referring provider defined for this encounter.  Primary Care Provider: Miguel Angel Clark, DO      Virtual or Telephone Consent    An interactive audio and video telecommunication system which permits real time communications between the patient (at the originating site) and provider (at the distant site) was utilized to provide this telehealth service.   Verbal consent was requested and obtained from Lexis Denny on this date, 25 for a telehealth visit and the patient's location was confirmed at the time of the visit.      Subjective    Notes some spotting for several days after D&C, better the last few days.       75 y.o. 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    PMH:  Medical History[1]     PSH:  Surgical History[2]   Dalcon Shield placement    OBHx:  The patient is a .  x1 She underwent menopause at 52. She used COCs for >5 years. Hx of Dalcon Shield IUD. She did not use HRT.    Social:  They deny alcohol, tobacco, and recreational drug use. The patient lives at home with her . 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     FamHx:  Their history is otherwise negative for a history of breast, ovarian, uterine, colon, pancreatic, and GI cancer.     Screening:  Cervical cancer: AGC, HPV neg in 2025  Mammogram:  1x unknown date, wnl  Colonoscopy: 1x unknown date      Review of Systems - Oncology     Objective   BSA: There is no height or weight on file to calculate BSA.  There were no vitals taken for this visit.     Family History[3]    Lexis Denny  reports that she has never  smoked. She has never used smokeless tobacco.  She  reports no history of alcohol use.  She  reports no history of drug use.    Physical Exam  Surgical Pathology Exam: [id number]  Order: [id number]   Collected 7/23/2025 14:44       Status: Final result       Dx: PMB (postmenopausal bleeding); Thicke...    Test Result Released: Yes (seen)    0 Result Notes       Component  Resulting Agency   FINAL DIAGNOSIS   A. ENDOCERVIX CURETTINGS:   -- ENDOMETRIAL CARCINOMA, ENDOMETRIOID TYPE, FIGO GRADE 1     B. ENDOMETRIUM CURETTINGS:   --ENDOMETRIAL CARCINOMA, ENDOMETRIOID TYPE, FIGO GRADE 1, SEE COMMENT  --FRAGMENTS OF BENIGN ENDOMETRIAL POLYP        Performance Status:  Asymptomatic    Assessment/Plan      Oncology History    No history exists.     75 y.o. presenting after D&C showing FIGO grade 1 endometrioid endometrial adenocarcinoma (MMR and p53 pending)    # Endometrial cancer  - Discussed the significance of histologic subtype, grade and stage  - Discussed management options including medical, non-surgical, and surgical options.   - Discussed recommendation for surgery with a hysterectomy, BSO, sentinel lymph node mapping and biopsy with possible need for unilateral or bilateral lymphadenectomy. We discussed my recommendation for a minimally invasive approach  - Discussed surgical risks, anticipated hospital stay, and recovery   - Plan for TLH/BSO/SLN  - She will need PAT  - She is potentially a candidate for same day DC     I spent 18 minutes virtually or in a telephone with this patient and/or family. More than 50% of the time was spent in counseling and/or coordination of care.                    [1]   Past Medical History:  Diagnosis Date    A-fib (Multi)     Arrhythmia     PAF    Arthritis     Arthritis is severe q    Asthma     Dysfunctional uterine bleeding     HTN (hypertension)     Hyperlipidemia, unspecified 11/28/2022    Hyperlipidemia    Neuralgia and neuritis, unspecified 05/18/2022    Neuropathic pain of  lower extremity, unspecified laterality    Neuromuscular disorder (Multi)     Diabetic neuropathy    GUMARO (obstructive sleep apnea)     Peripheral neuropathy     PMB (postmenopausal bleeding)     Prematurity (HHS-HCC)     Thickened endometrium     Type 2 diabetes mellitus without complications 11/28/2022    Diabetes mellitus, type 2    Vision loss     I have prescription glasses    Vitamin D deficiency    [2]   Past Surgical History:  Procedure Laterality Date    CARDIOVERSION  04/29/2024   [3]   Family History  Problem Relation Name Age of Onset    Diabetes Mother      Diabetes Maternal Grandmother      Diabetes type II Brother Anthony Ferraro     Alcohol abuse Maternal Grandfather Grandfather Dalton     Colon cancer Father Balwinder Ferraro     Diabetes Paternal Grandmother Leeann Ferraro     Diabetes Brother Anthony Ferraro     Hearing loss Brother Anthony Ferraro     Diabetes Paternal Grandmother Leeann Ferraro     Colon cancer Father Balwinder Ferraro     Alcohol abuse Maternal Grandfather Grandfather Dalton     Diabetes type II Brother Anthony Ferraro     Vision loss Brother Anthony Ferraro 0 - 9

## 2025-07-31 ENCOUNTER — TELEMEDICINE (OUTPATIENT)
Dept: GYNECOLOGIC ONCOLOGY | Facility: CLINIC | Age: 75
End: 2025-07-31
Payer: MEDICARE

## 2025-07-31 DIAGNOSIS — C54.1 ENDOMETRIAL CANCER (MULTI): Primary | ICD-10-CM

## 2025-07-31 PROCEDURE — 4010F ACE/ARB THERAPY RXD/TAKEN: CPT | Performed by: STUDENT IN AN ORGANIZED HEALTH CARE EDUCATION/TRAINING PROGRAM

## 2025-07-31 PROCEDURE — 1160F RVW MEDS BY RX/DR IN RCRD: CPT | Performed by: STUDENT IN AN ORGANIZED HEALTH CARE EDUCATION/TRAINING PROGRAM

## 2025-07-31 PROCEDURE — 1159F MED LIST DOCD IN RCRD: CPT | Performed by: STUDENT IN AN ORGANIZED HEALTH CARE EDUCATION/TRAINING PROGRAM

## 2025-07-31 PROCEDURE — 99024 POSTOP FOLLOW-UP VISIT: CPT | Performed by: STUDENT IN AN ORGANIZED HEALTH CARE EDUCATION/TRAINING PROGRAM

## 2025-08-01 PROCEDURE — RXMED WILLOW AMBULATORY MEDICATION CHARGE

## 2025-08-04 ENCOUNTER — APPOINTMENT (OUTPATIENT)
Dept: PRIMARY CARE | Facility: CLINIC | Age: 75
End: 2025-08-04
Payer: MEDICARE

## 2025-08-04 ENCOUNTER — PHARMACY VISIT (OUTPATIENT)
Dept: PHARMACY | Facility: CLINIC | Age: 75
End: 2025-08-04
Payer: COMMERCIAL

## 2025-08-04 VITALS
WEIGHT: 210 LBS | BODY MASS INDEX: 35.85 KG/M2 | SYSTOLIC BLOOD PRESSURE: 158 MMHG | OXYGEN SATURATION: 96 % | DIASTOLIC BLOOD PRESSURE: 78 MMHG | HEIGHT: 64 IN | HEART RATE: 70 BPM

## 2025-08-04 DIAGNOSIS — E78.2 MIXED HYPERLIPIDEMIA: ICD-10-CM

## 2025-08-04 DIAGNOSIS — E11.65 TYPE 2 DIABETES MELLITUS WITH HYPERGLYCEMIA, WITHOUT LONG-TERM CURRENT USE OF INSULIN: Primary | ICD-10-CM

## 2025-08-04 DIAGNOSIS — E66.01 MORBID (SEVERE) OBESITY DUE TO EXCESS CALORIES (MULTI): ICD-10-CM

## 2025-08-04 DIAGNOSIS — C54.1 ENDOMETRIAL CANCER (MULTI): ICD-10-CM

## 2025-08-04 DIAGNOSIS — E03.8 SUBCLINICAL HYPOTHYROIDISM: ICD-10-CM

## 2025-08-04 DIAGNOSIS — S91.109A OPEN WOUND OF TOE OF RIGHT FOOT: ICD-10-CM

## 2025-08-04 DIAGNOSIS — E11.69 TYPE 2 DIABETES MELLITUS WITH OTHER SPECIFIED COMPLICATION, WITHOUT LONG-TERM CURRENT USE OF INSULIN: ICD-10-CM

## 2025-08-04 DIAGNOSIS — G47.33 OSA (OBSTRUCTIVE SLEEP APNEA): ICD-10-CM

## 2025-08-04 DIAGNOSIS — I48.91 ATRIAL FIBRILLATION WITH RVR (MULTI): ICD-10-CM

## 2025-08-04 DIAGNOSIS — E11.40 PAINFUL DIABETIC NEUROPATHY (MULTI): ICD-10-CM

## 2025-08-04 DIAGNOSIS — I50.22 SYSTOLIC CHF, CHRONIC (MULTI): ICD-10-CM

## 2025-08-04 DIAGNOSIS — I48.91 ATRIAL FIBRILLATION WITH RAPID VENTRICULAR RESPONSE (MULTI): ICD-10-CM

## 2025-08-04 DIAGNOSIS — I10 ESSENTIAL HYPERTENSION: ICD-10-CM

## 2025-08-04 DIAGNOSIS — I50.20 SYSTOLIC CONGESTIVE HEART FAILURE, UNSPECIFIED HF CHRONICITY: ICD-10-CM

## 2025-08-04 DIAGNOSIS — Z00.00 MEDICARE ANNUAL WELLNESS VISIT, SUBSEQUENT: ICD-10-CM

## 2025-08-04 DIAGNOSIS — Z00.00 ROUTINE GENERAL MEDICAL EXAMINATION AT HEALTH CARE FACILITY: ICD-10-CM

## 2025-08-04 PROBLEM — T14.8XXA BRUISING: Status: RESOLVED | Noted: 2023-09-11 | Resolved: 2025-08-04

## 2025-08-04 PROBLEM — G47.10 ACUTE HYPERSOMNOLENCE DISORDER: Status: RESOLVED | Noted: 2023-09-11 | Resolved: 2025-08-04

## 2025-08-04 PROBLEM — R41.82 CHANGE IN MENTAL STATUS: Status: RESOLVED | Noted: 2023-09-11 | Resolved: 2025-08-04

## 2025-08-04 PROBLEM — L03.90 CELLULITIS: Status: RESOLVED | Noted: 2023-09-11 | Resolved: 2025-08-04

## 2025-08-04 PROBLEM — F99: Status: RESOLVED | Noted: 2023-09-11 | Resolved: 2025-08-04

## 2025-08-04 PROBLEM — R05.9 COUGH: Status: RESOLVED | Noted: 2023-09-11 | Resolved: 2025-08-04

## 2025-08-04 PROBLEM — M79.673 FOOT PAIN: Status: RESOLVED | Noted: 2023-09-11 | Resolved: 2025-08-04

## 2025-08-04 LAB
LAB AP ASR DISCLAIMER: NORMAL
LABORATORY COMMENT REPORT: NORMAL
PATH REPORT.ADDENDUM SPEC: NORMAL
PATH REPORT.ADDENDUM SPEC: NORMAL
PATH REPORT.COMMENTS IMP SPEC: NORMAL
PATH REPORT.FINAL DX SPEC: NORMAL
PATH REPORT.GROSS SPEC: NORMAL
PATH REPORT.RELEVANT HX SPEC: NORMAL
PATH REPORT.TOTAL CANCER: NORMAL
POC HEMOGLOBIN A1C: 7.6 % (ref 4.2–6.5)

## 2025-08-04 PROCEDURE — 1123F ACP DISCUSS/DSCN MKR DOCD: CPT

## 2025-08-04 PROCEDURE — 83036 HEMOGLOBIN GLYCOSYLATED A1C: CPT

## 2025-08-04 PROCEDURE — 3078F DIAST BP <80 MM HG: CPT

## 2025-08-04 PROCEDURE — G0439 PPPS, SUBSEQ VISIT: HCPCS

## 2025-08-04 PROCEDURE — 3077F SYST BP >= 140 MM HG: CPT

## 2025-08-04 PROCEDURE — 1170F FXNL STATUS ASSESSED: CPT

## 2025-08-04 PROCEDURE — 3051F HG A1C>EQUAL 7.0%<8.0%: CPT

## 2025-08-04 PROCEDURE — 1159F MED LIST DOCD IN RCRD: CPT

## 2025-08-04 PROCEDURE — 1158F ADVNC CARE PLAN TLK DOCD: CPT

## 2025-08-04 PROCEDURE — 1160F RVW MEDS BY RX/DR IN RCRD: CPT

## 2025-08-04 PROCEDURE — 1036F TOBACCO NON-USER: CPT

## 2025-08-04 PROCEDURE — 4010F ACE/ARB THERAPY RXD/TAKEN: CPT

## 2025-08-04 PROCEDURE — 99214 OFFICE O/P EST MOD 30 MIN: CPT

## 2025-08-04 ASSESSMENT — ENCOUNTER SYMPTOMS
CARDIOVASCULAR NEGATIVE: 1
LOSS OF SENSATION IN FEET: 0
GASTROINTESTINAL NEGATIVE: 1
ALLERGIC/IMMUNOLOGIC NEGATIVE: 1
RESPIRATORY NEGATIVE: 1
HEMATOLOGIC/LYMPHATIC NEGATIVE: 1
ENDOCRINE NEGATIVE: 1
DEPRESSION: 0
MUSCULOSKELETAL NEGATIVE: 1
PSYCHIATRIC NEGATIVE: 1
NEUROLOGICAL NEGATIVE: 1
OCCASIONAL FEELINGS OF UNSTEADINESS: 0
EYES NEGATIVE: 1

## 2025-08-04 ASSESSMENT — ACTIVITIES OF DAILY LIVING (ADL)
BATHING: INDEPENDENT
DOING_HOUSEWORK: NEEDS ASSISTANCE
TAKING_MEDICATION: INDEPENDENT
MANAGING_FINANCES: INDEPENDENT
GROCERY_SHOPPING: INDEPENDENT
DRESSING: INDEPENDENT

## 2025-08-04 NOTE — ASSESSMENT & PLAN NOTE
Uses cream daily to feet, hx of using gabapentin endorses did not help so stopped. Foot exam completed.

## 2025-08-04 NOTE — ASSESSMENT & PLAN NOTE
Assessed, lipid panel ordered. Is not currently on a statin. Has allergy to rosuvastatin. Follows with cardiology. CT cardiac scoring test in 2020 showed score of 0.   Orders:    Lipid Panel; Future

## 2025-08-04 NOTE — ASSESSMENT & PLAN NOTE
Follows with endocrinology. Currently on tirzeptide weekly. A1c today 7.6.  Orders:    POCT glycosylated hemoglobin (Hb A1C) manually resulted    Albumin-Creatinine Ratio, Urine Random; Future

## 2025-08-04 NOTE — PATIENT INSTRUCTIONS
I recommend taking tylenol arthritis nightly to help with daily arthritic pain.   You can try Voltaren gel nightly to joints prior to bed to see if that will help with pain.  Do not use any NSAIDs, like ibuprofen    I am ordering labs for you to get when you are fasting (meaning nothing to eat or drink for at least 12 hours before, water and black coffee are okay). Once we get the results, someone from the office will call you. If you do not hear from someone within one week of having your blood drawn, please call us [phone] so that we can go over the results.

## 2025-08-04 NOTE — ASSESSMENT & PLAN NOTE
Orders:    1 Year Follow Up In [facility] - PCP - Wellness Exam; Future    Follow Up In [facility] - PCP - Established; Future

## 2025-08-04 NOTE — PROGRESS NOTES
"Subjective   Reason for Visit: Lexis Denny is an 75 y.o. female here for a Medicare Wellness visit.     Past Medical, Surgical, and Family History reviewed and updated in chart.    Reviewed all medications by prescribing practitioner or clinical pharmacist (such as prescriptions, OTCs, herbal therapies and supplements) and documented in the medical record.    HPI  75 yof in office to establish care and medicare wellness. PMH Afib, HTN, HLD. CHF, DM2, obesity,     Has surgery with GYN for hysterectomy.   Has CGM: average blood sugars 160-190.    Followed with neurology  (neurology), suggested being treated for thyroid. PCP did not want to start on thyroid medication. Hot flashed, facial hair.       Follows with Ophthmology Cristóbal  Follows with Cardiology Talya  Follows with Endocrinology Guillermo  Follows with GYN Nancy    Patient Care Team:  Miguel Angel Clark DO as PCP - General (Internal Medicine)  Hiwot Orellana MD, MS as Consulting Physician (Obstetrics and Gynecology)     Review of Systems   HENT: Negative.     Eyes: Negative.    Respiratory: Negative.     Cardiovascular: Negative.    Gastrointestinal: Negative.    Endocrine: Negative.    Genitourinary: Negative.    Musculoskeletal: Negative.    Skin: Negative.    Allergic/Immunologic: Negative.    Neurological: Negative.    Hematological: Negative.    Psychiatric/Behavioral: Negative.     All other systems reviewed and are negative.      Objective   Vitals:  /78   Pulse 70   Ht 1.626 m (5' 4.02\")   Wt 95.3 kg (210 lb)   SpO2 96%   BMI 36.03 kg/m²       Physical Exam    Cardiovascular:      Pulses:           Dorsalis pedis pulses are 2+ on the right side and 2+ on the left side.     Musculoskeletal:        Feet:    Feet:      Right foot:      Protective Sensation: 5 sites tested.  5 sites sensed.      Skin integrity: Skin integrity normal.      Toenail Condition: Right toenails are abnormally thick.      Left foot:      " Protective Sensation: 5 sites tested.  5 sites sensed.      Skin integrity: Skin breakdown present.      Toenail Condition: Left toenails are abnormally thick.         Assessment & Plan  Type 2 diabetes mellitus with hyperglycemia, without long-term current use of insulin  Follows with endocrinology. Currently on tirzeptide weekly. A1c today 7.6.  Orders:    POCT glycosylated hemoglobin (Hb A1C) manually resulted    Albumin-Creatinine Ratio, Urine Random; Future    Routine general medical examination at health care facility    Orders:    1 Year Follow Up In [facility] - PCP - Wellness Exam; Future    Follow Up In [facility] - PCP - Established; Future    Atrial fibrillation with rapid ventricular response (Multi)  Follows with cardiology. Currently on blood thinner. Education provided on avoiding NSAIDs.        Atrial fibrillation with RVR (Multi)  Follows with cardiology. Currently on blood thinner. Education provided on avoiding NSAIDs.        Essential hypertension  Assessed. BP slightly elevated at 158/78 today in office. Follows with cardiology.   Orders:    CBC and Auto Differential; Future    Comprehensive Metabolic Panel; Future    Mixed hyperlipidemia  Assessed, lipid panel ordered. Is not currently on a statin. Has allergy to rosuvastatin. Follows with cardiology. CT cardiac scoring test in 2020 showed score of 0.   Orders:    Lipid Panel; Future    Type 2 diabetes mellitus with other specified complication, without long-term current use of insulin  Follows with endocrinology. Currently on tirzeptide weekly. A1c today 7.6.       Morbid (severe) obesity due to excess calories (Multi)  BMI 36, working on weight loss.        Endometrial cancer (Multi)  Follows with gyn/onc has surgery scheduled for hysterectomy.        Painful diabetic neuropathy (Multi)  Uses cream daily to feet, hx of using gabapentin endorses did not help so stopped. Foot exam completed.        GUMARO (obstructive sleep  apnea)         Systolic CHF, chronic (Multi)  Follows with cardiology, currently on lasix daily.   11/2024 ECHO:CONCLUSIONS:   1. The left ventricular systolic function is normal, with a visually estimated ejection fraction of 60-65%.   2. Spectral Doppler shows a Grade I (impaired relaxation pattern) of left ventricular diastolic filling with normal left atrial filling pressure.   3. There is normal right ventricular global systolic function.   4. The left atrium is mildly dilated.   5. Mild aortic valve stenosis.   6. There is mild mitral and tricuspid regurgitation.       Systolic congestive heart failure, unspecified HF chronicity  Follows with cardiology, currently on lasix daily.   11/2024 ECHO:CONCLUSIONS:   1. The left ventricular systolic function is normal, with a visually estimated ejection fraction of 60-65%.   2. Spectral Doppler shows a Grade I (impaired relaxation pattern) of left ventricular diastolic filling with normal left atrial filling pressure.   3. There is normal right ventricular global systolic function.   4. The left atrium is mildly dilated.   5. Mild aortic valve stenosis.   6. There is mild mitral and tricuspid regurgitation.       Medicare annual wellness visit, subsequent  Scheduled for hysterectomy.   Declines mammogram  Declines colon cancer screening.   Declines bones density scan  Orders:    CBC and Auto Differential; Future    Lipid Panel; Future    Albumin-Creatinine Ratio, Urine Random; Future    TSH with reflex to Free T4 if abnormal; Future    Thyroid Peroxidase (TPO) Antibody; Future    T3, free; Future    Comprehensive Metabolic Panel; Future    Subclinical hypothyroidism  TSH ordered, last check was slightly elevated. Will assess if treatment needed with blood work. Neurologist told her symptoms of hot flashes. Increase palm sweating are likely from TSH elevated.   Orders:    TSH with reflex to Free T4 if abnormal; Future    Thyroid Peroxidase (TPO) Antibody; Future    T3,  free; Future    Comprehensive Metabolic Panel; Future    Open wound of toe of right foot  Cleanse area with normal saline, cover with Band-Aid until healed. Keep area out of the shower to prevent infection. Follow up with podiatrist.

## 2025-08-04 NOTE — ASSESSMENT & PLAN NOTE
Follows with cardiology, currently on lasix daily.   11/2024 ECHO:CONCLUSIONS:   1. The left ventricular systolic function is normal, with a visually estimated ejection fraction of 60-65%.   2. Spectral Doppler shows a Grade I (impaired relaxation pattern) of left ventricular diastolic filling with normal left atrial filling pressure.   3. There is normal right ventricular global systolic function.   4. The left atrium is mildly dilated.   5. Mild aortic valve stenosis.   6. There is mild mitral and tricuspid regurgitation.

## 2025-08-04 NOTE — ASSESSMENT & PLAN NOTE
Assessed. BP slightly elevated at 158/78 today in office. Follows with cardiology.   Orders:    CBC and Auto Differential; Future    Comprehensive Metabolic Panel; Future

## 2025-08-05 ENCOUNTER — PHARMACY VISIT (OUTPATIENT)
Dept: PHARMACY | Facility: CLINIC | Age: 75
End: 2025-08-05
Payer: COMMERCIAL

## 2025-08-05 ENCOUNTER — ANESTHESIA EVENT (OUTPATIENT)
Dept: OPERATING ROOM | Facility: HOSPITAL | Age: 75
DRG: 270 | End: 2025-08-05
Payer: MEDICARE

## 2025-08-05 ENCOUNTER — TELEPHONE (OUTPATIENT)
Dept: ENDOCRINOLOGY | Facility: CLINIC | Age: 75
End: 2025-08-05
Payer: MEDICARE

## 2025-08-05 NOTE — TELEPHONE ENCOUNTER
Patient called today stating that she has a new PCP name Karla Lowe suggest that patient increase dose of Mounjaro 5mg to the next dose up. She having surgery on 8/20/25 for a hysterectomy with Dr. Lewis in [facility]. Patient is going to get bloodwork done today. States that she wanted to keep updated.

## 2025-08-05 NOTE — TELEPHONE ENCOUNTER
I have not seen the patient since 1/2024.  I would agree this would be helpful to go up on the mounjaro.  Will message PCP.

## 2025-08-06 ENCOUNTER — TELEPHONE (OUTPATIENT)
Dept: PRIMARY CARE | Facility: CLINIC | Age: 75
End: 2025-08-06
Payer: MEDICARE

## 2025-08-06 ENCOUNTER — RESULTS FOLLOW-UP (OUTPATIENT)
Dept: PRIMARY CARE | Facility: CLINIC | Age: 75
End: 2025-08-06
Payer: MEDICARE

## 2025-08-06 ENCOUNTER — TELEPHONE (OUTPATIENT)
Dept: GYNECOLOGIC ONCOLOGY | Facility: HOSPITAL | Age: 75
End: 2025-08-06
Payer: MEDICARE

## 2025-08-06 DIAGNOSIS — E11.65 TYPE 2 DIABETES MELLITUS WITH HYPERGLYCEMIA, WITHOUT LONG-TERM CURRENT USE OF INSULIN: ICD-10-CM

## 2025-08-06 LAB
ALBUMIN SERPL-MCNC: 4.6 G/DL (ref 3.6–5.1)
ALBUMIN/CREAT UR: 20 MG/G CREAT
ALP SERPL-CCNC: 59 U/L (ref 37–153)
ALT SERPL-CCNC: 17 U/L (ref 6–29)
ANION GAP SERPL CALCULATED.4IONS-SCNC: 10 MMOL/L (CALC) (ref 7–17)
AST SERPL-CCNC: 16 U/L (ref 10–35)
BASOPHILS # BLD AUTO: 78 CELLS/UL (ref 0–200)
BASOPHILS NFR BLD AUTO: 1.1 %
BILIRUB SERPL-MCNC: 0.5 MG/DL (ref 0.2–1.2)
BUN SERPL-MCNC: 20 MG/DL (ref 7–25)
CALCIUM SERPL-MCNC: 9.7 MG/DL (ref 8.6–10.4)
CHLORIDE SERPL-SCNC: 100 MMOL/L (ref 98–110)
CHOLEST SERPL-MCNC: 214 MG/DL
CHOLEST/HDLC SERPL: 6.3 (CALC)
CO2 SERPL-SCNC: 28 MMOL/L (ref 20–32)
CREAT SERPL-MCNC: 0.81 MG/DL (ref 0.6–1)
CREAT UR-MCNC: 41 MG/DL (ref 20–275)
EGFRCR SERPLBLD CKD-EPI 2021: 76 ML/MIN/1.73M2
EOSINOPHIL # BLD AUTO: 241 CELLS/UL (ref 15–500)
EOSINOPHIL NFR BLD AUTO: 3.4 %
ERYTHROCYTE [DISTWIDTH] IN BLOOD BY AUTOMATED COUNT: 14 % (ref 11–15)
GLUCOSE SERPL-MCNC: 128 MG/DL (ref 65–99)
HCT VFR BLD AUTO: 41 % (ref 35–45)
HDLC SERPL-MCNC: 34 MG/DL
HGB BLD-MCNC: 13.3 G/DL (ref 11.7–15.5)
LDLC SERPL CALC-MCNC: 140 MG/DL (CALC)
LYMPHOCYTES # BLD AUTO: 1995 CELLS/UL (ref 850–3900)
LYMPHOCYTES NFR BLD AUTO: 28.1 %
MCH RBC QN AUTO: 29 PG (ref 27–33)
MCHC RBC AUTO-ENTMCNC: 32.4 G/DL (ref 32–36)
MCV RBC AUTO: 89.3 FL (ref 80–100)
MICROALBUMIN UR-MCNC: 0.8 MG/DL
MONOCYTES # BLD AUTO: 767 CELLS/UL (ref 200–950)
MONOCYTES NFR BLD AUTO: 10.8 %
NEUTROPHILS # BLD AUTO: 4019 CELLS/UL (ref 1500–7800)
NEUTROPHILS NFR BLD AUTO: 56.6 %
NONHDLC SERPL-MCNC: 180 MG/DL (CALC)
PLATELET # BLD AUTO: 293 THOUSAND/UL (ref 140–400)
PMV BLD REES-ECKER: 10.8 FL (ref 7.5–12.5)
POTASSIUM SERPL-SCNC: 4.1 MMOL/L (ref 3.5–5.3)
PROT SERPL-MCNC: 7.3 G/DL (ref 6.1–8.1)
RBC # BLD AUTO: 4.59 MILLION/UL (ref 3.8–5.1)
SODIUM SERPL-SCNC: 138 MMOL/L (ref 135–146)
T3FREE SERPL-MCNC: 3.1 PG/ML (ref 2.3–4.2)
THYROPEROXIDASE AB SERPL-ACNC: <1 IU/ML
TRIGL SERPL-MCNC: 260 MG/DL
TSH SERPL-ACNC: 3.49 MIU/L (ref 0.4–4.5)
WBC # BLD AUTO: 7.1 THOUSAND/UL (ref 3.8–10.8)

## 2025-08-06 PROCEDURE — RXMED WILLOW AMBULATORY MEDICATION CHARGE

## 2025-08-06 RX ORDER — TIRZEPATIDE 7.5 MG/.5ML
7.5 INJECTION, SOLUTION SUBCUTANEOUS WEEKLY
Qty: 6 ML | Refills: 3 | Status: SHIPPED | OUTPATIENT
Start: 2025-08-06 | End: 2026-08-06

## 2025-08-06 NOTE — TELEPHONE ENCOUNTER
Patient informed, she would like to know if there is any other reason she gets so hot?  And she can't remember the actual allergy she had but that she is allergic. Please advise, she uses [facility]

## 2025-08-06 NOTE — TELEPHONE ENCOUNTER
Carey called to inform she called to informed that lab work was done and need results before the procedure

## 2025-08-06 NOTE — RESULT ENCOUNTER NOTE
Cholesterol levels are pretty elevated, I do not see you are on medication for your cholesterol but have an allergy to rosuvastatin. What was the allergy had to rosuvastatin? I recommend patient to be on something to help lower this number.   All other lab work is normal. Thyroid is within normal range also.

## 2025-08-06 NOTE — TELEPHONE ENCOUNTER
Patient called asking if she needs to sign surgery consent prior to PAT appointment.   Notified patient that she does not need to sign surgery consent prior to PAT appointment and that Dr. Orellana and her team will review surgery plan and have her sign consent the morning of surgery.  Patient verbalized her understanding of information given.

## 2025-08-08 ENCOUNTER — PHARMACY VISIT (OUTPATIENT)
Dept: PHARMACY | Facility: CLINIC | Age: 75
End: 2025-08-08
Payer: COMMERCIAL

## 2025-08-11 ENCOUNTER — TELEPHONE (OUTPATIENT)
Dept: PRIMARY CARE | Facility: CLINIC | Age: 75
End: 2025-08-11
Payer: MEDICARE

## 2025-08-11 ENCOUNTER — TELEPHONE (OUTPATIENT)
Dept: GYNECOLOGIC ONCOLOGY | Facility: HOSPITAL | Age: 75
End: 2025-08-11
Payer: MEDICARE

## 2025-08-12 ENCOUNTER — APPOINTMENT (OUTPATIENT)
Dept: GYNECOLOGIC ONCOLOGY | Facility: CLINIC | Age: 75
End: 2025-08-12
Payer: MEDICARE

## 2025-08-12 PROCEDURE — RXMED WILLOW AMBULATORY MEDICATION CHARGE

## 2025-08-13 ENCOUNTER — PHARMACY VISIT (OUTPATIENT)
Dept: PHARMACY | Facility: CLINIC | Age: 75
End: 2025-08-13
Payer: COMMERCIAL

## 2025-08-13 ENCOUNTER — TELEPHONE (OUTPATIENT)
Dept: GYNECOLOGIC ONCOLOGY | Facility: HOSPITAL | Age: 75
End: 2025-08-13
Payer: MEDICARE

## 2025-08-14 ENCOUNTER — PRE-ADMISSION TESTING (OUTPATIENT)
Dept: PREADMISSION TESTING | Facility: HOSPITAL | Age: 75
End: 2025-08-14
Payer: MEDICARE

## 2025-08-14 VITALS
BODY MASS INDEX: 35.87 KG/M2 | DIASTOLIC BLOOD PRESSURE: 77 MMHG | SYSTOLIC BLOOD PRESSURE: 148 MMHG | WEIGHT: 210.1 LBS | RESPIRATION RATE: 16 BRPM | HEART RATE: 78 BPM | HEIGHT: 64 IN | TEMPERATURE: 98.1 F

## 2025-08-14 DIAGNOSIS — Z01.818 PREOPERATIVE EXAMINATION: Primary | ICD-10-CM

## 2025-08-14 DIAGNOSIS — C54.1 ENDOMETRIAL CANCER (MULTI): ICD-10-CM

## 2025-08-14 LAB
ABO GROUP (TYPE) IN BLOOD: NORMAL
ANTIBODY SCREEN: NORMAL
RH FACTOR (ANTIGEN D): NORMAL

## 2025-08-14 PROCEDURE — 36415 COLL VENOUS BLD VENIPUNCTURE: CPT

## 2025-08-14 PROCEDURE — 99205 OFFICE O/P NEW HI 60 MIN: CPT | Performed by: NURSE PRACTITIONER

## 2025-08-14 PROCEDURE — 87081 CULTURE SCREEN ONLY: CPT | Mod: GEALAB

## 2025-08-14 PROCEDURE — 86900 BLOOD TYPING SEROLOGIC ABO: CPT

## 2025-08-14 RX ORDER — CHLORHEXIDINE GLUCONATE 40 MG/ML
1 SOLUTION TOPICAL DAILY
Start: 2025-08-14 | End: 2025-08-23 | Stop reason: HOSPADM

## 2025-08-14 RX ORDER — CHLORHEXIDINE GLUCONATE ORAL RINSE 1.2 MG/ML
15 SOLUTION DENTAL DAILY
Qty: 30 ML | Refills: 0 | Status: SHIPPED | OUTPATIENT
Start: 2025-08-14 | End: 2025-08-23 | Stop reason: HOSPADM

## 2025-08-14 RX ORDER — CHLORHEXIDINE GLUCONATE ORAL RINSE 1.2 MG/ML
15 SOLUTION DENTAL DAILY
Qty: 30 ML | Refills: 0 | Status: CANCELLED | OUTPATIENT
Start: 2025-08-14 | End: 2025-08-16

## 2025-08-14 RX ORDER — CHLORHEXIDINE GLUCONATE 40 MG/ML
1 SOLUTION TOPICAL DAILY
Status: CANCELLED
Start: 2025-08-14 | End: 2025-08-19

## 2025-08-14 ASSESSMENT — PAIN SCALES - GENERAL: PAINLEVEL_OUTOF10: 3

## 2025-08-14 ASSESSMENT — ENCOUNTER SYMPTOMS
NEUROLOGICAL NEGATIVE: 1
EYES NEGATIVE: 1
RESPIRATORY NEGATIVE: 1
GASTROINTESTINAL NEGATIVE: 1
CARDIOVASCULAR NEGATIVE: 1
CONSTITUTIONAL NEGATIVE: 1

## 2025-08-14 ASSESSMENT — PAIN - FUNCTIONAL ASSESSMENT: PAIN_FUNCTIONAL_ASSESSMENT: 0-10

## 2025-08-14 ASSESSMENT — DUKE ACTIVITY SCORE INDEX (DASI)
CAN YOU DO MODERATE WORK AROUND THE HOUSE LIKE VACUUMING, SWEEPING FLOORS OR CARRYING GROCERIES: YES
DASI METS SCORE: 4.7
CAN YOU HAVE SEXUAL RELATIONS: YES
CAN YOU TAKE CARE OF YOURSELF (EAT, DRESS, BATHE, OR USE TOILET): YES
CAN YOU CLIMB A FLIGHT OF STAIRS OR WALK UP A HILL: NO
CAN YOU RUN A SHORT DISTANCE: NO
CAN YOU PARTICIPATE IN MODERATE RECREATIONAL ACTIVITIES LIKE GOLF, BOWLING, DANCING, DOUBLES TENNIS OR THROWING A BASEBALL OR FOOTBALL: NO
CAN YOU DO HEAVY WORK AROUND THE HOUSE LIKE SCRUBBING FLOORS OR LIFTING AND MOVING HEAVY FURNITURE: NO
TOTAL_SCORE: 15.95
CAN YOU PARTICIPATE IN STRENOUS SPORTS LIKE SWIMMING, SINGLES TENNIS, FOOTBALL, BASKETBALL, OR SKIING: NO
CAN YOU DO LIGHT WORK AROUND THE HOUSE LIKE DUSTING OR WASHING DISHES: YES
CAN YOU WALK A BLOCK OR TWO ON LEVEL GROUND: NO
CAN YOU DO YARD WORK LIKE RAKING LEAVES, WEEDING OR PUSHING A MOWER: NO
CAN YOU WALK INDOORS, SUCH AS AROUND YOUR HOUSE: YES

## 2025-08-14 ASSESSMENT — LIFESTYLE VARIABLES: SMOKING_STATUS: NONSMOKER

## 2025-08-16 LAB — STAPHYLOCOCCUS SPEC CULT: NORMAL

## 2025-08-19 RX ORDER — SODIUM CHLORIDE, SODIUM LACTATE, POTASSIUM CHLORIDE, CALCIUM CHLORIDE 600; 310; 30; 20 MG/100ML; MG/100ML; MG/100ML; MG/100ML
100 INJECTION, SOLUTION INTRAVENOUS CONTINUOUS
Status: CANCELLED | OUTPATIENT
Start: 2025-08-19 | End: 2025-08-20

## 2025-08-19 RX ORDER — DIPHENHYDRAMINE HYDROCHLORIDE 50 MG/ML
12.5 INJECTION, SOLUTION INTRAMUSCULAR; INTRAVENOUS ONCE AS NEEDED
Status: CANCELLED | OUTPATIENT
Start: 2025-08-19

## 2025-08-19 RX ORDER — MEPERIDINE HYDROCHLORIDE 25 MG/ML
12.5 INJECTION INTRAMUSCULAR; INTRAVENOUS; SUBCUTANEOUS EVERY 10 MIN PRN
Status: CANCELLED | OUTPATIENT
Start: 2025-08-19

## 2025-08-19 RX ORDER — ALBUTEROL SULFATE 0.83 MG/ML
2.5 SOLUTION RESPIRATORY (INHALATION) ONCE AS NEEDED
Status: CANCELLED | OUTPATIENT
Start: 2025-08-19

## 2025-08-19 RX ORDER — OXYCODONE HYDROCHLORIDE 5 MG/1
5 TABLET ORAL EVERY 4 HOURS PRN
Refills: 0 | Status: CANCELLED | OUTPATIENT
Start: 2025-08-19

## 2025-08-19 RX ORDER — DROPERIDOL 2.5 MG/ML
0.62 INJECTION, SOLUTION INTRAMUSCULAR; INTRAVENOUS ONCE AS NEEDED
Status: CANCELLED | OUTPATIENT
Start: 2025-08-19

## 2025-08-19 RX ORDER — ONDANSETRON HYDROCHLORIDE 2 MG/ML
4 INJECTION, SOLUTION INTRAVENOUS ONCE AS NEEDED
Status: CANCELLED | OUTPATIENT
Start: 2025-08-19

## 2025-08-20 ENCOUNTER — HOSPITAL ENCOUNTER (INPATIENT)
Facility: HOSPITAL | Age: 75
LOS: 1 days | Discharge: ANOTHER HOSPITAL | DRG: 270 | End: 2025-08-20
Attending: STUDENT IN AN ORGANIZED HEALTH CARE EDUCATION/TRAINING PROGRAM | Admitting: STUDENT IN AN ORGANIZED HEALTH CARE EDUCATION/TRAINING PROGRAM
Payer: MEDICARE

## 2025-08-20 ENCOUNTER — HOSPITAL ENCOUNTER (INPATIENT)
Facility: HOSPITAL | Age: 75
Discharge: OTHER INSTITUTION | DRG: 982 | End: 2025-08-20
Attending: SURGERY | Admitting: SURGERY
Payer: MEDICARE

## 2025-08-20 ENCOUNTER — ANESTHESIA EVENT (OUTPATIENT)
Dept: OPERATING ROOM | Facility: HOSPITAL | Age: 75
DRG: 981 | End: 2025-08-20
Payer: MEDICARE

## 2025-08-20 ENCOUNTER — ANESTHESIA (OUTPATIENT)
Dept: OPERATING ROOM | Facility: HOSPITAL | Age: 75
DRG: 981 | End: 2025-08-20
Payer: MEDICARE

## 2025-08-20 ENCOUNTER — ANESTHESIA (OUTPATIENT)
Dept: OPERATING ROOM | Facility: HOSPITAL | Age: 75
DRG: 270 | End: 2025-08-20
Payer: MEDICARE

## 2025-08-20 ENCOUNTER — HOSPITAL ENCOUNTER (INPATIENT)
Facility: HOSPITAL | Age: 75
LOS: 3 days | Discharge: HOME | DRG: 982 | End: 2025-08-23
Attending: SURGERY | Admitting: SURGERY
Payer: MEDICARE

## 2025-08-20 VITALS
HEIGHT: 64 IN | BODY MASS INDEX: 35.57 KG/M2 | SYSTOLIC BLOOD PRESSURE: 195 MMHG | HEART RATE: 78 BPM | DIASTOLIC BLOOD PRESSURE: 90 MMHG | WEIGHT: 208.34 LBS | RESPIRATION RATE: 20 BRPM | TEMPERATURE: 97.9 F | OXYGEN SATURATION: 100 %

## 2025-08-20 DIAGNOSIS — S35.513A: Primary | ICD-10-CM

## 2025-08-20 DIAGNOSIS — C54.1 ENDOMETRIAL CANCER (MULTI): ICD-10-CM

## 2025-08-20 PROBLEM — S35.511A: Status: ACTIVE | Noted: 2025-08-20

## 2025-08-20 PROBLEM — S35.513D: Status: ACTIVE | Noted: 2025-08-20

## 2025-08-20 LAB
ABO GROUP (TYPE) IN BLOOD: NORMAL
GLUCOSE BLD MANUAL STRIP-MCNC: 145 MG/DL (ref 74–99)
RH FACTOR (ANTIGEN D): NORMAL

## 2025-08-20 PROCEDURE — 94002 VENT MGMT INPAT INIT DAY: CPT

## 2025-08-20 PROCEDURE — 2720000007 HC OR 272 NO HCPCS: Performed by: STUDENT IN AN ORGANIZED HEALTH CARE EDUCATION/TRAINING PROGRAM

## 2025-08-20 PROCEDURE — 0UT28ZZ RESECTION OF BILATERAL OVARIES, VIA NATURAL OR ARTIFICIAL OPENING ENDOSCOPIC: ICD-10-PCS | Performed by: STUDENT IN AN ORGANIZED HEALTH CARE EDUCATION/TRAINING PROGRAM

## 2025-08-20 PROCEDURE — 2500000004 HC RX 250 GENERAL PHARMACY W/ HCPCS (ALT 636 FOR OP/ED): Performed by: NURSE ANESTHETIST, CERTIFIED REGISTERED

## 2025-08-20 PROCEDURE — 2500000004 HC RX 250 GENERAL PHARMACY W/ HCPCS (ALT 636 FOR OP/ED): Performed by: ANESTHESIOLOGY

## 2025-08-20 PROCEDURE — 1210000006 HC SEMI PRIVATE ROOM - IP ONLY PROCEDURE WITH INTENT

## 2025-08-20 PROCEDURE — 0UT98ZZ RESECTION OF UTERUS, VIA NATURAL OR ARTIFICIAL OPENING ENDOSCOPIC: ICD-10-PCS | Performed by: STUDENT IN AN ORGANIZED HEALTH CARE EDUCATION/TRAINING PROGRAM

## 2025-08-20 PROCEDURE — P9045 ALBUMIN (HUMAN), 5%, 250 ML: HCPCS | Mod: JZ,TB

## 2025-08-20 PROCEDURE — 2500000004 HC RX 250 GENERAL PHARMACY W/ HCPCS (ALT 636 FOR OP/ED)

## 2025-08-20 PROCEDURE — 04LH4ZZ OCCLUSION OF RIGHT EXTERNAL ILIAC ARTERY, PERCUTANEOUS ENDOSCOPIC APPROACH: ICD-10-PCS | Performed by: STUDENT IN AN ORGANIZED HEALTH CARE EDUCATION/TRAINING PROGRAM

## 2025-08-20 PROCEDURE — A35663

## 2025-08-20 PROCEDURE — A58571 PR LAPAROSCOPY W TOT HYSTERECTUTERUS <=250 GRAM  W TUBE/OVARY: Performed by: ANESTHESIOLOGY

## 2025-08-20 PROCEDURE — 2500000005 HC RX 250 GENERAL PHARMACY W/O HCPCS: Performed by: NURSE ANESTHETIST, CERTIFIED REGISTERED

## 2025-08-20 PROCEDURE — A35663: Performed by: ANESTHESIOLOGY

## 2025-08-20 PROCEDURE — 1210000001 HC SEMI-PRIVATE ROOM DAILY

## 2025-08-20 PROCEDURE — 2780000003 HC OR 278 NO HCPCS: Performed by: STUDENT IN AN ORGANIZED HEALTH CARE EDUCATION/TRAINING PROGRAM

## 2025-08-20 PROCEDURE — 3600000009 HC OR TIME - EACH INCREMENTAL 1 MINUTE - PROCEDURE LEVEL FOUR: Performed by: STUDENT IN AN ORGANIZED HEALTH CARE EDUCATION/TRAINING PROGRAM

## 2025-08-20 PROCEDURE — 0UT78ZZ RESECTION OF BILATERAL FALLOPIAN TUBES, VIA NATURAL OR ARTIFICIAL OPENING ENDOSCOPIC: ICD-10-PCS | Performed by: STUDENT IN AN ORGANIZED HEALTH CARE EDUCATION/TRAINING PROGRAM

## 2025-08-20 PROCEDURE — A58571 PR LAPAROSCOPY W TOT HYSTERECTUTERUS <=250 GRAM  W TUBE/OVARY: Performed by: NURSE ANESTHETIST, CERTIFIED REGISTERED

## 2025-08-20 PROCEDURE — 82947 ASSAY GLUCOSE BLOOD QUANT: CPT

## 2025-08-20 PROCEDURE — 36620 INSERTION CATHETER ARTERY: CPT | Performed by: ANESTHESIOLOGY

## 2025-08-20 PROCEDURE — 96372 THER/PROPH/DIAG INJ SC/IM: CPT | Performed by: STUDENT IN AN ORGANIZED HEALTH CARE EDUCATION/TRAINING PROGRAM

## 2025-08-20 PROCEDURE — 99100 ANES PT EXTEME AGE<1 YR&>70: CPT | Performed by: ANESTHESIOLOGY

## 2025-08-20 PROCEDURE — 3700000002 HC GENERAL ANESTHESIA TIME - EACH INCREMENTAL 1 MINUTE: Performed by: STUDENT IN AN ORGANIZED HEALTH CARE EDUCATION/TRAINING PROGRAM

## 2025-08-20 PROCEDURE — 36415 COLL VENOUS BLD VENIPUNCTURE: CPT | Performed by: STUDENT IN AN ORGANIZED HEALTH CARE EDUCATION/TRAINING PROGRAM

## 2025-08-20 PROCEDURE — 3600000004 HC OR TIME - INITIAL BASE CHARGE - PROCEDURE LEVEL FOUR: Performed by: STUDENT IN AN ORGANIZED HEALTH CARE EDUCATION/TRAINING PROGRAM

## 2025-08-20 PROCEDURE — 2500000005 HC RX 250 GENERAL PHARMACY W/O HCPCS: Performed by: STUDENT IN AN ORGANIZED HEALTH CARE EDUCATION/TRAINING PROGRAM

## 2025-08-20 PROCEDURE — 2500000004 HC RX 250 GENERAL PHARMACY W/ HCPCS (ALT 636 FOR OP/ED): Performed by: STUDENT IN AN ORGANIZED HEALTH CARE EDUCATION/TRAINING PROGRAM

## 2025-08-20 PROCEDURE — 2500000005 HC RX 250 GENERAL PHARMACY W/O HCPCS: Performed by: ANESTHESIOLOGY

## 2025-08-20 PROCEDURE — 94681 O2 UPTK CO2 OUTP % O2 XTRC: CPT

## 2025-08-20 PROCEDURE — 99140 ANES COMP EMERGENCY COND: CPT | Performed by: ANESTHESIOLOGY

## 2025-08-20 PROCEDURE — 07TC4ZZ RESECTION OF PELVIS LYMPHATIC, PERCUTANEOUS ENDOSCOPIC APPROACH: ICD-10-PCS | Performed by: STUDENT IN AN ORGANIZED HEALTH CARE EDUCATION/TRAINING PROGRAM

## 2025-08-20 PROCEDURE — 7100000001 HC RECOVERY ROOM TIME - INITIAL BASE CHARGE: Performed by: STUDENT IN AN ORGANIZED HEALTH CARE EDUCATION/TRAINING PROGRAM

## 2025-08-20 PROCEDURE — 7100000002 HC RECOVERY ROOM TIME - EACH INCREMENTAL 1 MINUTE: Performed by: STUDENT IN AN ORGANIZED HEALTH CARE EDUCATION/TRAINING PROGRAM

## 2025-08-20 PROCEDURE — 3700000001 HC GENERAL ANESTHESIA TIME - INITIAL BASE CHARGE: Performed by: STUDENT IN AN ORGANIZED HEALTH CARE EDUCATION/TRAINING PROGRAM

## 2025-08-20 RX ORDER — ONDANSETRON 4 MG/1
4 TABLET, ORALLY DISINTEGRATING ORAL EVERY 8 HOURS PRN
Qty: 20 TABLET | Refills: 0 | OUTPATIENT
Start: 2025-08-20 | End: 2025-08-27

## 2025-08-20 RX ORDER — PHENYLEPHRINE HYDROCHLORIDE 10 MG/ML
INJECTION INTRAVENOUS AS NEEDED
Status: DISCONTINUED | OUTPATIENT
Start: 2025-08-20 | End: 2025-08-20

## 2025-08-20 RX ORDER — HYDROMORPHONE HYDROCHLORIDE 1 MG/ML
INJECTION, SOLUTION INTRAMUSCULAR; INTRAVENOUS; SUBCUTANEOUS AS NEEDED
Status: DISCONTINUED | OUTPATIENT
Start: 2025-08-20 | End: 2025-08-20

## 2025-08-20 RX ORDER — ALBUMIN HUMAN 50 G/1000ML
SOLUTION INTRAVENOUS AS NEEDED
Status: DISCONTINUED | OUTPATIENT
Start: 2025-08-20 | End: 2025-08-20

## 2025-08-20 RX ORDER — ONDANSETRON HYDROCHLORIDE 2 MG/ML
INJECTION, SOLUTION INTRAVENOUS AS NEEDED
Status: DISCONTINUED | OUTPATIENT
Start: 2025-08-20 | End: 2025-08-20

## 2025-08-20 RX ORDER — MIDAZOLAM HYDROCHLORIDE 1 MG/ML
INJECTION, SOLUTION INTRAMUSCULAR; INTRAVENOUS AS NEEDED
Status: DISCONTINUED | OUTPATIENT
Start: 2025-08-20 | End: 2025-08-20

## 2025-08-20 RX ORDER — ACETAMINOPHEN 10 MG/ML
INJECTION, SOLUTION INTRAVENOUS AS NEEDED
Status: DISCONTINUED | OUTPATIENT
Start: 2025-08-20 | End: 2025-08-20

## 2025-08-20 RX ORDER — BUPIVACAINE HYDROCHLORIDE 5 MG/ML
INJECTION, SOLUTION PERINEURAL AS NEEDED
Status: DISCONTINUED | OUTPATIENT
Start: 2025-08-20 | End: 2025-08-20 | Stop reason: HOSPADM

## 2025-08-20 RX ORDER — CEFAZOLIN 1 G/1
INJECTION, POWDER, FOR SOLUTION INTRAVENOUS AS NEEDED
Status: DISCONTINUED | OUTPATIENT
Start: 2025-08-20 | End: 2025-08-20

## 2025-08-20 RX ORDER — IBUPROFEN 600 MG/1
600 TABLET, FILM COATED ORAL 4 TIMES DAILY PRN
Qty: 90 TABLET | Refills: 0 | OUTPATIENT
Start: 2025-08-20 | End: 2026-08-20

## 2025-08-20 RX ORDER — PROTAMINE SULFATE 10 MG/ML
INJECTION, SOLUTION INTRAVENOUS AS NEEDED
Status: DISCONTINUED | OUTPATIENT
Start: 2025-08-20 | End: 2025-08-20

## 2025-08-20 RX ORDER — ACETAMINOPHEN 500 MG
1000 TABLET ORAL EVERY 6 HOURS PRN
Qty: 30 TABLET | Refills: 0 | OUTPATIENT
Start: 2025-08-20 | End: 2025-08-30

## 2025-08-20 RX ORDER — HEPARIN SODIUM 5000 [USP'U]/ML
5000 INJECTION, SOLUTION INTRAVENOUS; SUBCUTANEOUS ONCE
Status: COMPLETED | OUTPATIENT
Start: 2025-08-20 | End: 2025-08-20

## 2025-08-20 RX ORDER — HEPARIN SODIUM 1000 [USP'U]/ML
INJECTION, SOLUTION INTRAVENOUS; SUBCUTANEOUS AS NEEDED
Status: DISCONTINUED | OUTPATIENT
Start: 2025-08-20 | End: 2025-08-20

## 2025-08-20 RX ORDER — NORETHINDRONE AND ETHINYL ESTRADIOL 0.5-0.035
KIT ORAL AS NEEDED
Status: DISCONTINUED | OUTPATIENT
Start: 2025-08-20 | End: 2025-08-20

## 2025-08-20 RX ORDER — FENTANYL CITRATE 50 UG/ML
INJECTION, SOLUTION INTRAMUSCULAR; INTRAVENOUS AS NEEDED
Status: DISCONTINUED | OUTPATIENT
Start: 2025-08-20 | End: 2025-08-20

## 2025-08-20 RX ORDER — INDOCYANINE GREEN AND WATER 25 MG
5 KIT INJECTION ONCE
Status: DISCONTINUED | OUTPATIENT
Start: 2025-08-20 | End: 2025-08-20 | Stop reason: HOSPADM

## 2025-08-20 RX ORDER — ACETAMINOPHEN 325 MG/1
975 TABLET ORAL ONCE
Status: DISCONTINUED | OUTPATIENT
Start: 2025-08-20 | End: 2025-08-20 | Stop reason: HOSPADM

## 2025-08-20 RX ORDER — ROCURONIUM BROMIDE 10 MG/ML
INJECTION, SOLUTION INTRAVENOUS AS NEEDED
Status: DISCONTINUED | OUTPATIENT
Start: 2025-08-20 | End: 2025-08-20

## 2025-08-20 RX ORDER — SODIUM CHLORIDE, SODIUM LACTATE, POTASSIUM CHLORIDE, CALCIUM CHLORIDE 600; 310; 30; 20 MG/100ML; MG/100ML; MG/100ML; MG/100ML
20 INJECTION, SOLUTION INTRAVENOUS CONTINUOUS
Status: DISCONTINUED | OUTPATIENT
Start: 2025-08-20 | End: 2025-08-20 | Stop reason: HOSPADM

## 2025-08-20 RX ORDER — LIDOCAINE HYDROCHLORIDE 20 MG/ML
INJECTION, SOLUTION INFILTRATION; PERINEURAL AS NEEDED
Status: DISCONTINUED | OUTPATIENT
Start: 2025-08-20 | End: 2025-08-20

## 2025-08-20 RX ORDER — TRAMADOL HYDROCHLORIDE 50 MG/1
50 TABLET, FILM COATED ORAL EVERY 8 HOURS PRN
Qty: 10 TABLET | Refills: 0 | OUTPATIENT
Start: 2025-08-20 | End: 2025-08-25

## 2025-08-20 RX ORDER — PROPOFOL 10 MG/ML
INJECTION, EMULSION INTRAVENOUS AS NEEDED
Status: DISCONTINUED | OUTPATIENT
Start: 2025-08-20 | End: 2025-08-20

## 2025-08-20 RX ADMIN — MIDAZOLAM 2 MG: 1 INJECTION INTRAMUSCULAR; INTRAVENOUS at 13:01

## 2025-08-20 RX ADMIN — HEPARIN SODIUM 5000 UNITS: 1000 INJECTION INTRAVENOUS; SUBCUTANEOUS at 12:25

## 2025-08-20 RX ADMIN — HYDROMORPHONE HYDROCHLORIDE 0.1 MG: 1 INJECTION, SOLUTION INTRAMUSCULAR; INTRAVENOUS; SUBCUTANEOUS at 17:07

## 2025-08-20 RX ADMIN — LIDOCAINE HYDROCHLORIDE 100 MG: 20 INJECTION, SOLUTION INFILTRATION; PERINEURAL at 11:02

## 2025-08-20 RX ADMIN — Medication 1 DOSE: at 13:06

## 2025-08-20 RX ADMIN — ACETAMINOPHEN 1000 MG: 1000 INJECTION, SOLUTION INTRAVENOUS at 11:00

## 2025-08-20 RX ADMIN — HEPARIN SODIUM 5000 UNITS: 5000 INJECTION, SOLUTION INTRAVENOUS; SUBCUTANEOUS at 10:28

## 2025-08-20 RX ADMIN — ROCURONIUM BROMIDE 10 MG: 10 INJECTION INTRAVENOUS at 15:39

## 2025-08-20 RX ADMIN — FENTANYL CITRATE 75 MCG: 50 INJECTION, SOLUTION INTRAMUSCULAR; INTRAVENOUS at 11:02

## 2025-08-20 RX ADMIN — HYDROMORPHONE HYDROCHLORIDE 0.2 MG: 1 INJECTION, SOLUTION INTRAMUSCULAR; INTRAVENOUS; SUBCUTANEOUS at 16:57

## 2025-08-20 RX ADMIN — HYDROMORPHONE HYDROCHLORIDE 0.2 MG: 1 INJECTION, SOLUTION INTRAMUSCULAR; INTRAVENOUS; SUBCUTANEOUS at 17:11

## 2025-08-20 RX ADMIN — ROCURONIUM BROMIDE 50 MG: 10 INJECTION INTRAVENOUS at 14:34

## 2025-08-20 RX ADMIN — PROPOFOL 150 MG: 10 INJECTION, EMULSION INTRAVENOUS at 11:02

## 2025-08-20 RX ADMIN — PROPOFOL 50 MG: 10 INJECTION, EMULSION INTRAVENOUS at 11:10

## 2025-08-20 RX ADMIN — PHENYLEPHRINE HYDROCHLORIDE 240 MCG: 10 INJECTION INTRAVENOUS at 15:04

## 2025-08-20 RX ADMIN — HYDROMORPHONE HYDROCHLORIDE 0.1 MG: 1 INJECTION, SOLUTION INTRAMUSCULAR; INTRAVENOUS; SUBCUTANEOUS at 17:04

## 2025-08-20 RX ADMIN — EPHEDRINE SULFATE 5 MG: 50 INJECTION, SOLUTION INTRAVENOUS at 11:56

## 2025-08-20 RX ADMIN — CEFAZOLIN 2 G: 1 INJECTION, POWDER, FOR SOLUTION INTRAMUSCULAR; INTRAVENOUS at 15:04

## 2025-08-20 RX ADMIN — CEFAZOLIN 2 G: 1 INJECTION, POWDER, FOR SOLUTION INTRAMUSCULAR; INTRAVENOUS at 11:08

## 2025-08-20 RX ADMIN — HYDROMORPHONE HYDROCHLORIDE 0.4 MG: 1 INJECTION, SOLUTION INTRAMUSCULAR; INTRAVENOUS; SUBCUTANEOUS at 16:49

## 2025-08-20 RX ADMIN — ROCURONIUM BROMIDE 20 MG: 10 INJECTION, SOLUTION INTRAVENOUS at 11:52

## 2025-08-20 RX ADMIN — PROPOFOL 50 MCG/KG/MIN: 10 INJECTION, EMULSION INTRAVENOUS at 12:25

## 2025-08-20 RX ADMIN — MIDAZOLAM 2 MG: 1 INJECTION INTRAMUSCULAR; INTRAVENOUS at 10:54

## 2025-08-20 RX ADMIN — FENTANYL CITRATE 25 MCG: 50 INJECTION, SOLUTION INTRAMUSCULAR; INTRAVENOUS at 11:10

## 2025-08-20 RX ADMIN — ALBUMIN HUMAN 250 ML: 0.05 INJECTION, SOLUTION INTRAVENOUS at 15:10

## 2025-08-20 RX ADMIN — FENTANYL CITRATE 100 MCG: 50 INJECTION, SOLUTION INTRAMUSCULAR; INTRAVENOUS at 13:02

## 2025-08-20 RX ADMIN — PHENYLEPHRINE HYDROCHLORIDE 80 MCG: 10 INJECTION INTRAVENOUS at 15:45

## 2025-08-20 RX ADMIN — SODIUM CHLORIDE, SODIUM LACTATE, POTASSIUM CHLORIDE, AND CALCIUM CHLORIDE 20 ML/HR: .6; .31; .03; .02 INJECTION, SOLUTION INTRAVENOUS at 10:28

## 2025-08-20 RX ADMIN — ROCURONIUM BROMIDE 60 MG: 10 INJECTION, SOLUTION INTRAVENOUS at 11:02

## 2025-08-20 RX ADMIN — EPHEDRINE SULFATE 10 MG: 50 INJECTION, SOLUTION INTRAVENOUS at 12:41

## 2025-08-20 RX ADMIN — Medication 15 MG: at 11:02

## 2025-08-20 RX ADMIN — SUGAMMADEX 200 MG: 100 INJECTION, SOLUTION INTRAVENOUS at 16:44

## 2025-08-20 RX ADMIN — ONDANSETRON 4 MG: 2 INJECTION INTRAMUSCULAR; INTRAVENOUS at 16:36

## 2025-08-20 RX ADMIN — PROTAMINE SULFATE 40 MG: 10 INJECTION, SOLUTION INTRAVENOUS at 16:05

## 2025-08-20 RX ADMIN — SODIUM CHLORIDE: 9 INJECTION, SOLUTION INTRAVENOUS at 14:40

## 2025-08-20 RX ADMIN — EPHEDRINE SULFATE 5 MG: 50 INJECTION, SOLUTION INTRAVENOUS at 11:58

## 2025-08-20 RX ADMIN — HEPARIN SODIUM 9000 UNITS: 1000 INJECTION INTRAVENOUS; SUBCUTANEOUS at 15:16

## 2025-08-20 SDOH — HEALTH STABILITY: MENTAL HEALTH: CURRENT SMOKER: 0

## 2025-08-20 ASSESSMENT — PAIN SCALES - GENERAL
PAINLEVEL_OUTOF10: 7
PAINLEVEL_OUTOF10: 4
PAINLEVEL_OUTOF10: 7
PAINLEVEL_OUTOF10: 10 - WORST POSSIBLE PAIN
PAIN_LEVEL: 0
PAINLEVEL_OUTOF10: 10 - WORST POSSIBLE PAIN
PAINLEVEL_OUTOF10: 4
PAIN_LEVEL: 5
PAINLEVEL_OUTOF10: 10 - WORST POSSIBLE PAIN

## 2025-08-20 ASSESSMENT — PAIN - FUNCTIONAL ASSESSMENT
PAIN_FUNCTIONAL_ASSESSMENT: 0-10

## 2025-08-20 ASSESSMENT — ENCOUNTER SYMPTOMS
COUGH: 0
NAUSEA: 0
VOMITING: 0
SHORTNESS OF BREATH: 0
FATIGUE: 0
DIFFICULTY URINATING: 0
FEVER: 0
HEADACHES: 0
STRIDOR: 0
LIGHT-HEADEDNESS: 0
ABDOMINAL DISTENTION: 0
PALPITATIONS: 0

## 2025-08-20 ASSESSMENT — PAIN DESCRIPTION - ORIENTATION: ORIENTATION: MID

## 2025-08-20 ASSESSMENT — PAIN DESCRIPTION - LOCATION: LOCATION: ABDOMEN

## 2025-08-21 ENCOUNTER — APPOINTMENT (OUTPATIENT)
Dept: RADIOLOGY | Facility: HOSPITAL | Age: 75
DRG: 982 | End: 2025-08-21
Payer: MEDICARE

## 2025-08-21 ASSESSMENT — COGNITIVE AND FUNCTIONAL STATUS - GENERAL
MOBILITY SCORE: 18
STANDING UP FROM CHAIR USING ARMS: A LITTLE
MOVING FROM LYING ON BACK TO SITTING ON SIDE OF FLAT BED WITH BEDRAILS: A LITTLE
DRESSING REGULAR UPPER BODY CLOTHING: A LITTLE
WALKING IN HOSPITAL ROOM: A LITTLE
DAILY ACTIVITIY SCORE: 20
DAILY ACTIVITIY SCORE: 20
TOILETING: A LITTLE
MOVING FROM LYING ON BACK TO SITTING ON SIDE OF FLAT BED WITH BEDRAILS: A LITTLE
WALKING IN HOSPITAL ROOM: A LITTLE
MOBILITY SCORE: 18
DRESSING REGULAR UPPER BODY CLOTHING: A LITTLE
MOVING TO AND FROM BED TO CHAIR: A LITTLE
HELP NEEDED FOR BATHING: A LITTLE
TOILETING: A LITTLE
DRESSING REGULAR LOWER BODY CLOTHING: A LITTLE
CLIMB 3 TO 5 STEPS WITH RAILING: A LITTLE
STANDING UP FROM CHAIR USING ARMS: A LITTLE
MOVING TO AND FROM BED TO CHAIR: A LITTLE
CLIMB 3 TO 5 STEPS WITH RAILING: A LITTLE
HELP NEEDED FOR BATHING: A LITTLE
TURNING FROM BACK TO SIDE WHILE IN FLAT BAD: A LITTLE
TURNING FROM BACK TO SIDE WHILE IN FLAT BAD: A LITTLE
DRESSING REGULAR LOWER BODY CLOTHING: A LITTLE

## 2025-08-21 ASSESSMENT — PAIN - FUNCTIONAL ASSESSMENT
PAIN_FUNCTIONAL_ASSESSMENT: 0-10
PAIN_FUNCTIONAL_ASSESSMENT: UNABLE TO SELF-REPORT
PAIN_FUNCTIONAL_ASSESSMENT: 0-10
PAIN_FUNCTIONAL_ASSESSMENT: 0-10

## 2025-08-21 ASSESSMENT — PAIN SCALES - GENERAL
PAINLEVEL_OUTOF10: 5 - MODERATE PAIN
PAINLEVEL_OUTOF10: 2
PAINLEVEL_OUTOF10: 3
PAINLEVEL_OUTOF10: 5 - MODERATE PAIN
PAINLEVEL_OUTOF10: 10 - WORST POSSIBLE PAIN
PAINLEVEL_OUTOF10: 7
PAINLEVEL_OUTOF10: 9
PAINLEVEL_OUTOF10: 10 - WORST POSSIBLE PAIN
PAINLEVEL_OUTOF10: 8
PAINLEVEL_OUTOF10: 5 - MODERATE PAIN

## 2025-08-21 ASSESSMENT — PAIN DESCRIPTION - LOCATION
LOCATION: ABDOMEN

## 2025-08-21 ASSESSMENT — PAIN DESCRIPTION - ORIENTATION
ORIENTATION: MID

## 2025-08-22 ENCOUNTER — APPOINTMENT (OUTPATIENT)
Dept: CARDIOLOGY | Facility: HOSPITAL | Age: 75
End: 2025-08-22
Payer: MEDICARE

## 2025-08-22 DIAGNOSIS — R09.89 OTHER SPECIFIED SYMPTOMS AND SIGNS INVOLVING THE CIRCULATORY AND RESPIRATORY SYSTEMS: ICD-10-CM

## 2025-08-22 DIAGNOSIS — S35.50XA: Primary | ICD-10-CM

## 2025-08-22 SDOH — SOCIAL STABILITY: SOCIAL INSECURITY: HAVE YOU HAD ANY THOUGHTS OF HARMING ANYONE ELSE?: NO

## 2025-08-22 SDOH — SOCIAL STABILITY: SOCIAL INSECURITY: WITHIN THE LAST YEAR, HAVE YOU BEEN AFRAID OF YOUR PARTNER OR EX-PARTNER?: NO

## 2025-08-22 SDOH — ECONOMIC STABILITY: FOOD INSECURITY: WITHIN THE PAST 12 MONTHS, YOU WORRIED THAT YOUR FOOD WOULD RUN OUT BEFORE YOU GOT THE MONEY TO BUY MORE.: NEVER TRUE

## 2025-08-22 SDOH — HEALTH STABILITY: MENTAL HEALTH: HOW OFTEN DO YOU HAVE SIX OR MORE DRINKS ON ONE OCCASION?: NEVER

## 2025-08-22 SDOH — HEALTH STABILITY: MENTAL HEALTH: HOW OFTEN DO YOU HAVE A DRINK CONTAINING ALCOHOL?: NEVER

## 2025-08-22 SDOH — SOCIAL STABILITY: SOCIAL INSECURITY: ARE THERE ANY APPARENT SIGNS OF INJURIES/BEHAVIORS THAT COULD BE RELATED TO ABUSE/NEGLECT?: NO

## 2025-08-22 SDOH — HEALTH STABILITY: MENTAL HEALTH: HOW MANY DRINKS CONTAINING ALCOHOL DO YOU HAVE ON A TYPICAL DAY WHEN YOU ARE DRINKING?: PATIENT DOES NOT DRINK

## 2025-08-22 SDOH — ECONOMIC STABILITY: INCOME INSECURITY: IN THE PAST 12 MONTHS HAS THE ELECTRIC, GAS, OIL, OR WATER COMPANY THREATENED TO SHUT OFF SERVICES IN YOUR HOME?: NO

## 2025-08-22 SDOH — SOCIAL STABILITY: SOCIAL INSECURITY: HAVE YOU HAD THOUGHTS OF HARMING ANYONE ELSE?: NO

## 2025-08-22 SDOH — SOCIAL STABILITY: SOCIAL INSECURITY: ARE YOU OR HAVE YOU BEEN THREATENED OR ABUSED PHYSICALLY, EMOTIONALLY, OR SEXUALLY BY ANYONE?: NO

## 2025-08-22 SDOH — SOCIAL STABILITY: SOCIAL INSECURITY
WITHIN THE LAST YEAR, HAVE YOU BEEN RAPED OR FORCED TO HAVE ANY KIND OF SEXUAL ACTIVITY BY YOUR PARTNER OR EX-PARTNER?: NO

## 2025-08-22 SDOH — SOCIAL STABILITY: SOCIAL INSECURITY: WITHIN THE LAST YEAR, HAVE YOU BEEN HUMILIATED OR EMOTIONALLY ABUSED IN OTHER WAYS BY YOUR PARTNER OR EX-PARTNER?: NO

## 2025-08-22 SDOH — SOCIAL STABILITY: SOCIAL INSECURITY: WERE YOU ABLE TO COMPLETE ALL THE BEHAVIORAL HEALTH SCREENINGS?: YES

## 2025-08-22 SDOH — ECONOMIC STABILITY: FOOD INSECURITY: WITHIN THE PAST 12 MONTHS, THE FOOD YOU BOUGHT JUST DIDN'T LAST AND YOU DIDN'T HAVE MONEY TO GET MORE.: NEVER TRUE

## 2025-08-22 SDOH — ECONOMIC STABILITY: FOOD INSECURITY: HOW HARD IS IT FOR YOU TO PAY FOR THE VERY BASICS LIKE FOOD, HOUSING, MEDICAL CARE, AND HEATING?: NOT HARD AT ALL

## 2025-08-22 SDOH — ECONOMIC STABILITY: HOUSING INSECURITY: IN THE LAST 12 MONTHS, WAS THERE A TIME WHEN YOU WERE NOT ABLE TO PAY THE MORTGAGE OR RENT ON TIME?: NO

## 2025-08-22 SDOH — SOCIAL STABILITY: SOCIAL INSECURITY: HAS ANYONE EVER THREATENED TO HURT YOUR FAMILY OR YOUR PETS?: NO

## 2025-08-22 SDOH — SOCIAL STABILITY: SOCIAL INSECURITY: ABUSE: ADULT

## 2025-08-22 SDOH — SOCIAL STABILITY: SOCIAL INSECURITY: DO YOU FEEL ANYONE HAS EXPLOITED OR TAKEN ADVANTAGE OF YOU FINANCIALLY OR OF YOUR PERSONAL PROPERTY?: NO

## 2025-08-22 SDOH — SOCIAL STABILITY: SOCIAL INSECURITY: DOES ANYONE TRY TO KEEP YOU FROM HAVING/CONTACTING OTHER FRIENDS OR DOING THINGS OUTSIDE YOUR HOME?: NO

## 2025-08-22 SDOH — SOCIAL STABILITY: SOCIAL INSECURITY: DO YOU FEEL UNSAFE GOING BACK TO THE PLACE WHERE YOU ARE LIVING?: NO

## 2025-08-22 SDOH — ECONOMIC STABILITY: HOUSING INSECURITY: DO YOU FEEL UNSAFE GOING BACK TO THE PLACE WHERE YOU LIVE?: NO

## 2025-08-22 ASSESSMENT — LIFESTYLE VARIABLES
SKIP TO QUESTIONS 9-10: 1
HOW OFTEN DO YOU HAVE 6 OR MORE DRINKS ON ONE OCCASION: NEVER
AUDIT-C TOTAL SCORE: 0
HOW MANY STANDARD DRINKS CONTAINING ALCOHOL DO YOU HAVE ON A TYPICAL DAY: PATIENT DOES NOT DRINK
AUDIT-C TOTAL SCORE: 0
AUDIT-C TOTAL SCORE: 0
SKIP TO QUESTIONS 9-10: 1
HOW OFTEN DO YOU HAVE A DRINK CONTAINING ALCOHOL: NEVER

## 2025-08-22 ASSESSMENT — COGNITIVE AND FUNCTIONAL STATUS - GENERAL
DRESSING REGULAR UPPER BODY CLOTHING: A LITTLE
DAILY ACTIVITIY SCORE: 20
CLIMB 3 TO 5 STEPS WITH RAILING: A LITTLE
HELP NEEDED FOR BATHING: A LITTLE
DRESSING REGULAR UPPER BODY CLOTHING: A LITTLE
TOILETING: A LITTLE
MOBILITY SCORE: 18
MOVING TO AND FROM BED TO CHAIR: A LITTLE
WALKING IN HOSPITAL ROOM: A LITTLE
MOBILITY SCORE: 23
CLIMB 3 TO 5 STEPS WITH RAILING: A LITTLE
PATIENT BASELINE BEDBOUND: NO
HELP NEEDED FOR BATHING: A LITTLE
CLIMB 3 TO 5 STEPS WITH RAILING: A LITTLE
MOBILITY SCORE: 22
STANDING UP FROM CHAIR USING ARMS: A LITTLE
DAILY ACTIVITIY SCORE: 24
DRESSING REGULAR LOWER BODY CLOTHING: A LITTLE
TURNING FROM BACK TO SIDE WHILE IN FLAT BAD: A LITTLE
PERSONAL GROOMING: A LITTLE
DAILY ACTIVITIY SCORE: 19
WALKING IN HOSPITAL ROOM: A LITTLE
MOVING FROM LYING ON BACK TO SITTING ON SIDE OF FLAT BED WITH BEDRAILS: A LITTLE
DRESSING REGULAR LOWER BODY CLOTHING: A LITTLE
TOILETING: A LITTLE

## 2025-08-22 ASSESSMENT — ACTIVITIES OF DAILY LIVING (ADL)
FEEDING YOURSELF: INDEPENDENT
PATIENT'S MEMORY ADEQUATE TO SAFELY COMPLETE DAILY ACTIVITIES?: YES
WALKS IN HOME: INDEPENDENT
ADEQUATE_TO_COMPLETE_ADL: YES
HEARING - LEFT EAR: FUNCTIONAL
GROOMING: INDEPENDENT
TOILETING: INDEPENDENT
BATHING_ASSISTANCE: MINIMAL
HEARING - RIGHT EAR: FUNCTIONAL
LACK_OF_TRANSPORTATION: NO
BATHING: INDEPENDENT
DRESSING YOURSELF: INDEPENDENT
JUDGMENT_ADEQUATE_SAFELY_COMPLETE_DAILY_ACTIVITIES: YES
ADL_ASSISTANCE: INDEPENDENT
ADL_ASSISTANCE: INDEPENDENT
EFFECT OF PAIN ON DAILY ACTIVITIES: DISCOMFORT

## 2025-08-22 ASSESSMENT — PATIENT HEALTH QUESTIONNAIRE - PHQ9
SUM OF ALL RESPONSES TO PHQ9 QUESTIONS 1 & 2: 0
1. LITTLE INTEREST OR PLEASURE IN DOING THINGS: NOT AT ALL
2. FEELING DOWN, DEPRESSED OR HOPELESS: NOT AT ALL

## 2025-08-22 ASSESSMENT — PAIN SCALES - GENERAL
PAINLEVEL_OUTOF10: 6
PAINLEVEL_OUTOF10: 3
PAINLEVEL_OUTOF10: 3
PAINLEVEL_OUTOF10: 4
PAINLEVEL_OUTOF10: 2
PAINLEVEL_OUTOF10: 4
PAINLEVEL_OUTOF10: 0 - NO PAIN
PAINLEVEL_OUTOF10: 8
PAINLEVEL_OUTOF10: 4
PAINLEVEL_OUTOF10: 6
PAINLEVEL_OUTOF10: 10 - WORST POSSIBLE PAIN
PAINLEVEL_OUTOF10: 5 - MODERATE PAIN

## 2025-08-22 ASSESSMENT — PAIN - FUNCTIONAL ASSESSMENT
PAIN_FUNCTIONAL_ASSESSMENT: 0-10

## 2025-08-22 ASSESSMENT — PAIN DESCRIPTION - DESCRIPTORS: DESCRIPTORS: ACHING;DISCOMFORT;TENDER

## 2025-08-22 ASSESSMENT — PAIN DESCRIPTION - LOCATION: LOCATION: ABDOMEN

## 2025-08-23 ENCOUNTER — PHARMACY VISIT (OUTPATIENT)
Dept: PHARMACY | Facility: CLINIC | Age: 75
End: 2025-08-23
Payer: COMMERCIAL

## 2025-08-23 PROCEDURE — RXMED WILLOW AMBULATORY MEDICATION CHARGE

## 2025-08-23 ASSESSMENT — PAIN - FUNCTIONAL ASSESSMENT: PAIN_FUNCTIONAL_ASSESSMENT: 0-10

## 2025-08-23 ASSESSMENT — PAIN SCALES - GENERAL: PAINLEVEL_OUTOF10: 5 - MODERATE PAIN

## 2025-08-24 ENCOUNTER — DOCUMENTATION (OUTPATIENT)
Dept: HOME HEALTH SERVICES | Facility: HOME HEALTH | Age: 75
End: 2025-08-24
Payer: MEDICARE

## 2025-08-25 ENCOUNTER — TELEPHONE (OUTPATIENT)
Dept: HOME HEALTH SERVICES | Facility: HOME HEALTH | Age: 75
End: 2025-08-25
Payer: MEDICARE

## 2025-08-25 ENCOUNTER — PATIENT OUTREACH (OUTPATIENT)
Dept: PRIMARY CARE | Facility: CLINIC | Age: 75
End: 2025-08-25
Payer: MEDICARE

## 2025-08-26 ENCOUNTER — TELEPHONE (OUTPATIENT)
Dept: HEMATOLOGY/ONCOLOGY | Facility: HOSPITAL | Age: 75
End: 2025-08-26
Payer: MEDICARE

## 2025-08-26 ENCOUNTER — TELEPHONE (OUTPATIENT)
Dept: GYNECOLOGIC ONCOLOGY | Facility: HOSPITAL | Age: 75
End: 2025-08-26
Payer: MEDICARE

## 2025-08-26 DIAGNOSIS — Z98.890 POST-OPERATIVE STATE: ICD-10-CM

## 2025-08-26 RX ORDER — METHOCARBAMOL 500 MG/1
500 TABLET, FILM COATED ORAL EVERY 8 HOURS SCHEDULED
Qty: 6 TABLET | Refills: 0 | Status: SHIPPED | OUTPATIENT
Start: 2025-08-26 | End: 2025-08-28

## 2025-08-26 RX ORDER — OXYCODONE HYDROCHLORIDE 5 MG/1
5 TABLET ORAL EVERY 4 HOURS PRN
Qty: 15 TABLET | Refills: 0 | Status: SHIPPED | OUTPATIENT
Start: 2025-08-26

## 2025-09-02 ENCOUNTER — OFFICE VISIT (OUTPATIENT)
Dept: GYNECOLOGIC ONCOLOGY | Facility: CLINIC | Age: 75
End: 2025-09-02
Payer: MEDICARE

## 2025-09-02 VITALS
WEIGHT: 205.8 LBS | OXYGEN SATURATION: 96 % | DIASTOLIC BLOOD PRESSURE: 71 MMHG | TEMPERATURE: 98.2 F | RESPIRATION RATE: 18 BRPM | SYSTOLIC BLOOD PRESSURE: 158 MMHG | BODY MASS INDEX: 35.57 KG/M2 | HEART RATE: 72 BPM

## 2025-09-02 DIAGNOSIS — C54.1 ENDOMETRIAL CANCER (MULTI): Primary | ICD-10-CM

## 2025-09-02 DIAGNOSIS — Z98.890 POST-OPERATIVE STATE: ICD-10-CM

## 2025-09-02 PROCEDURE — 1111F DSCHRG MED/CURRENT MED MERGE: CPT | Performed by: STUDENT IN AN ORGANIZED HEALTH CARE EDUCATION/TRAINING PROGRAM

## 2025-09-02 PROCEDURE — 3078F DIAST BP <80 MM HG: CPT | Performed by: STUDENT IN AN ORGANIZED HEALTH CARE EDUCATION/TRAINING PROGRAM

## 2025-09-02 PROCEDURE — 3077F SYST BP >= 140 MM HG: CPT | Performed by: STUDENT IN AN ORGANIZED HEALTH CARE EDUCATION/TRAINING PROGRAM

## 2025-09-02 PROCEDURE — 1125F AMNT PAIN NOTED PAIN PRSNT: CPT | Performed by: STUDENT IN AN ORGANIZED HEALTH CARE EDUCATION/TRAINING PROGRAM

## 2025-09-02 PROCEDURE — 99211 OFF/OP EST MAY X REQ PHY/QHP: CPT | Performed by: STUDENT IN AN ORGANIZED HEALTH CARE EDUCATION/TRAINING PROGRAM

## 2025-09-02 PROCEDURE — 4010F ACE/ARB THERAPY RXD/TAKEN: CPT | Performed by: STUDENT IN AN ORGANIZED HEALTH CARE EDUCATION/TRAINING PROGRAM

## 2025-09-02 PROCEDURE — 1159F MED LIST DOCD IN RCRD: CPT | Performed by: STUDENT IN AN ORGANIZED HEALTH CARE EDUCATION/TRAINING PROGRAM

## 2025-09-02 PROCEDURE — 3051F HG A1C>EQUAL 7.0%<8.0%: CPT | Performed by: STUDENT IN AN ORGANIZED HEALTH CARE EDUCATION/TRAINING PROGRAM

## 2025-09-02 PROCEDURE — 1036F TOBACCO NON-USER: CPT | Performed by: STUDENT IN AN ORGANIZED HEALTH CARE EDUCATION/TRAINING PROGRAM

## 2025-09-02 PROCEDURE — 1160F RVW MEDS BY RX/DR IN RCRD: CPT | Performed by: STUDENT IN AN ORGANIZED HEALTH CARE EDUCATION/TRAINING PROGRAM

## 2025-09-02 RX ORDER — METHOCARBAMOL 500 MG/1
500 TABLET, FILM COATED ORAL EVERY 8 HOURS SCHEDULED
Qty: 21 TABLET | Refills: 0 | Status: SHIPPED | OUTPATIENT
Start: 2025-09-02 | End: 2025-09-12

## 2025-09-02 ASSESSMENT — PAIN SCALES - GENERAL: PAINLEVEL_OUTOF10: 6

## 2025-09-03 ENCOUNTER — APPOINTMENT (OUTPATIENT)
Dept: PRIMARY CARE | Facility: CLINIC | Age: 75
End: 2025-09-03
Payer: MEDICARE

## 2025-09-03 PROBLEM — Z09 HOSPITAL DISCHARGE FOLLOW-UP: Status: ACTIVE | Noted: 2025-09-03

## 2025-09-03 ASSESSMENT — ENCOUNTER SYMPTOMS
ENDOCRINE NEGATIVE: 1
HEMATOLOGIC/LYMPHATIC NEGATIVE: 1
PSYCHIATRIC NEGATIVE: 1
CARDIOVASCULAR NEGATIVE: 1
MUSCULOSKELETAL NEGATIVE: 1
EYES NEGATIVE: 1
CONSTITUTIONAL NEGATIVE: 1
WOUND: 1
OCCASIONAL FEELINGS OF UNSTEADINESS: 0
RESPIRATORY NEGATIVE: 1
NEUROLOGICAL NEGATIVE: 1
DEPRESSION: 0
GASTROINTESTINAL NEGATIVE: 1
ALLERGIC/IMMUNOLOGIC NEGATIVE: 1
LOSS OF SENSATION IN FEET: 0

## 2025-09-04 ENCOUNTER — PATIENT OUTREACH (OUTPATIENT)
Dept: PRIMARY CARE | Facility: CLINIC | Age: 75
End: 2025-09-04
Payer: MEDICARE

## 2025-09-04 PROCEDURE — RXMED WILLOW AMBULATORY MEDICATION CHARGE

## 2025-09-05 ENCOUNTER — TELEPHONE (OUTPATIENT)
Dept: ENDOCRINOLOGY | Facility: CLINIC | Age: 75
End: 2025-09-05
Payer: MEDICARE

## 2025-09-05 ENCOUNTER — PHARMACY VISIT (OUTPATIENT)
Dept: PHARMACY | Facility: CLINIC | Age: 75
End: 2025-09-05
Payer: MEDICARE

## 2025-09-05 PROCEDURE — RXMED WILLOW AMBULATORY MEDICATION CHARGE

## 2025-09-16 ENCOUNTER — APPOINTMENT (OUTPATIENT)
Dept: PRIMARY CARE | Facility: CLINIC | Age: 75
End: 2025-09-16
Payer: MEDICARE

## 2025-11-04 ENCOUNTER — APPOINTMENT (OUTPATIENT)
Dept: PRIMARY CARE | Facility: CLINIC | Age: 75
End: 2025-11-04
Payer: MEDICARE

## 2025-11-17 ENCOUNTER — APPOINTMENT (OUTPATIENT)
Dept: ENDOCRINOLOGY | Facility: CLINIC | Age: 75
End: 2025-11-17
Payer: MEDICARE

## 2026-08-05 ENCOUNTER — APPOINTMENT (OUTPATIENT)
Dept: PRIMARY CARE | Facility: CLINIC | Age: 76
End: 2026-08-05
Payer: MEDICARE

## (undated) DEVICE — SYRINGE, 10 CC, LUER LOCK

## (undated) DEVICE — DRAPE, UNDERBUTTOCKS

## (undated) DEVICE — PROTECTORS, ONE STEP, ARM LARGE, W/DERMAPROX

## (undated) DEVICE — ELECTRODE, OPTI2 LAPAROSCOPIC SPATULA, CURVED

## (undated) DEVICE — Device

## (undated) DEVICE — TUBE SET, PNEUMOCLEAR, SMOKE EVACU, HIGH-FLOW

## (undated) DEVICE — LIGASURE, LAPAROSCOPIC,  5MM BLUNT TIP, 44CM

## (undated) DEVICE — SUTURE, VICRYL, 0, 27 IN, UR-6, VIOLET

## (undated) DEVICE — TROCAR SYSTEM, BALLOON, KII GELPORT, 12 X 100MM

## (undated) DEVICE — SCISSOR, MINI ENDO CUT, TIPS, DISP

## (undated) DEVICE — CAP, SELF SEAL, GYNECOLOGY, F/4-10FR, ST

## (undated) DEVICE — NEEDLE, SAFETY, 22 G X 1.5 IN

## (undated) DEVICE — SYRINGE, 50 CC, LUER LOCK

## (undated) DEVICE — COVER, TABLE, 44X90

## (undated) DEVICE — SOLUTION, IV 0.9% NACL INJECTION USP 1000ML EXCEL PLUS, BAG

## (undated) DEVICE — SYSTEM, FIOS FIRST ENTRY, 5 X 100MM, KII ADVANCED FIXATION

## (undated) DEVICE — DEVICE, FORCE TRIVERSE ELECTROSURGICAL

## (undated) DEVICE — TRAY, MINOR, SINGLE BASIN, STERILE

## (undated) DEVICE — COVER HANDLE LIGHT, STERIS, BLUE, STERILE

## (undated) DEVICE — DRAPE PACK, LAVH, W/ATTACHED LEGGINGS, W/POUCH, 100 X 114 IN, LF, STERILE

## (undated) DEVICE — POSITIONING, THE PINK PAD, PIGAZZI SYSTEM

## (undated) DEVICE — APPLICATOR, CHLORAPREP, W/ORANGE TINT, 26ML

## (undated) DEVICE — DRAPE, INSTRUMENT, W/POUCH, STERI DRAPE, 9 5/8 X 18 LONG

## (undated) DEVICE — ASSEMBLY, STRYKER FLOW 2, SUCTION IRRIGATOR, WITH TIP

## (undated) DEVICE — ADHESIVE, SKIN, DERMABOND ADVANCED, 15CM, PEN-STYLE

## (undated) DEVICE — SUTURE, STRATAFIX, SPIRAL PDS PLUS, 0, 9IN, 36CM, CT-1, VIOLET

## (undated) DEVICE — GOWN, SURGICAL, IMPLT, BACK, DISPOSABLE, XLARGE, STERILE

## (undated) DEVICE — PREP TRAY, SKIN, DRY, W/GLOVES

## (undated) DEVICE — SUTURE, VICRYL, 4-0, 18 IN, PS2, UNDYED

## (undated) DEVICE — SOLUTION, IRRIGATION, 0.9% SODIUM CHLORIDE, 1000 ML, HANG BOTTLE